# Patient Record
Sex: MALE | Race: WHITE | HISPANIC OR LATINO | Employment: OTHER | URBAN - METROPOLITAN AREA
[De-identification: names, ages, dates, MRNs, and addresses within clinical notes are randomized per-mention and may not be internally consistent; named-entity substitution may affect disease eponyms.]

---

## 2017-01-18 ENCOUNTER — ALLSCRIPTS OFFICE VISIT (OUTPATIENT)
Dept: OTHER | Facility: OTHER | Age: 62
End: 2017-01-18

## 2017-01-18 DIAGNOSIS — I10 ESSENTIAL (PRIMARY) HYPERTENSION: ICD-10-CM

## 2017-01-18 DIAGNOSIS — I26.99 OTHER PULMONARY EMBOLISM WITHOUT ACUTE COR PULMONALE (HCC): ICD-10-CM

## 2017-01-18 DIAGNOSIS — I70.219 ATHEROSCLEROSIS OF NATIVE ARTERIES OF EXTREMITY WITH INTERMITTENT CLAUDICATION (HCC): ICD-10-CM

## 2017-01-18 DIAGNOSIS — R09.89 OTHER SPECIFIED SYMPTOMS AND SIGNS INVOLVING THE CIRCULATORY AND RESPIRATORY SYSTEMS: ICD-10-CM

## 2017-01-18 DIAGNOSIS — E55.9 VITAMIN D DEFICIENCY: ICD-10-CM

## 2017-01-23 ENCOUNTER — GENERIC CONVERSION - ENCOUNTER (OUTPATIENT)
Dept: OTHER | Facility: OTHER | Age: 62
End: 2017-01-23

## 2017-01-23 ENCOUNTER — APPOINTMENT (OUTPATIENT)
Dept: LAB | Facility: HOSPITAL | Age: 62
End: 2017-01-23
Payer: MEDICARE

## 2017-01-23 DIAGNOSIS — I10 ESSENTIAL (PRIMARY) HYPERTENSION: ICD-10-CM

## 2017-01-23 DIAGNOSIS — E55.9 VITAMIN D DEFICIENCY: ICD-10-CM

## 2017-01-23 DIAGNOSIS — I70.219 ATHEROSCLEROSIS OF NATIVE ARTERIES OF EXTREMITY WITH INTERMITTENT CLAUDICATION (HCC): ICD-10-CM

## 2017-01-23 DIAGNOSIS — I26.99 OTHER PULMONARY EMBOLISM WITHOUT ACUTE COR PULMONALE (HCC): ICD-10-CM

## 2017-01-23 DIAGNOSIS — R09.89 OTHER SPECIFIED SYMPTOMS AND SIGNS INVOLVING THE CIRCULATORY AND RESPIRATORY SYSTEMS: ICD-10-CM

## 2017-01-23 LAB
ALBUMIN SERPL BCP-MCNC: 3.5 G/DL (ref 3.5–5)
ALP SERPL-CCNC: 57 U/L (ref 46–116)
ALT SERPL W P-5'-P-CCNC: 28 U/L (ref 12–78)
ANION GAP SERPL CALCULATED.3IONS-SCNC: 5 MMOL/L (ref 4–13)
AST SERPL W P-5'-P-CCNC: 22 U/L (ref 5–45)
BASOPHILS # BLD AUTO: 0.04 THOUSANDS/ΜL (ref 0–0.1)
BASOPHILS NFR BLD AUTO: 0 % (ref 0–1)
BILIRUB SERPL-MCNC: 0.73 MG/DL (ref 0.2–1)
BUN SERPL-MCNC: 17 MG/DL (ref 5–25)
CALCIUM SERPL-MCNC: 9.7 MG/DL (ref 8.3–10.1)
CHLORIDE SERPL-SCNC: 101 MMOL/L (ref 100–108)
CHOLEST SERPL-MCNC: 190 MG/DL (ref 50–200)
CO2 SERPL-SCNC: 32 MMOL/L (ref 21–32)
CREAT SERPL-MCNC: 1.06 MG/DL (ref 0.6–1.3)
CREAT UR-MCNC: 220 MG/DL
EOSINOPHIL # BLD AUTO: 0.41 THOUSAND/ΜL (ref 0–0.61)
EOSINOPHIL NFR BLD AUTO: 4 % (ref 0–6)
ERYTHROCYTE [DISTWIDTH] IN BLOOD BY AUTOMATED COUNT: 13.3 % (ref 11.6–15.1)
GFR SERPL CREATININE-BSD FRML MDRD: >60 ML/MIN/1.73SQ M
GLUCOSE SERPL-MCNC: 100 MG/DL (ref 65–140)
HCT VFR BLD AUTO: 43.5 % (ref 36.5–49.3)
HDLC SERPL-MCNC: 38 MG/DL (ref 40–60)
HGB BLD-MCNC: 14.4 G/DL (ref 12–17)
LDLC SERPL CALC-MCNC: 119 MG/DL (ref 0–100)
LYMPHOCYTES # BLD AUTO: 4.27 THOUSANDS/ΜL (ref 0.6–4.47)
LYMPHOCYTES NFR BLD AUTO: 45 % (ref 14–44)
MCH RBC QN AUTO: 30.1 PG (ref 26.8–34.3)
MCHC RBC AUTO-ENTMCNC: 33.1 G/DL (ref 31.4–37.4)
MCV RBC AUTO: 91 FL (ref 82–98)
MICROALBUMIN UR-MCNC: 37.4 MG/L (ref 0–20)
MICROALBUMIN/CREAT 24H UR: 17 MG/G CREATININE (ref 0–30)
MONOCYTES # BLD AUTO: 0.8 THOUSAND/ΜL (ref 0.17–1.22)
MONOCYTES NFR BLD AUTO: 8 % (ref 4–12)
NEUTROPHILS # BLD AUTO: 4.23 THOUSANDS/ΜL (ref 1.85–7.62)
NEUTS SEG NFR BLD AUTO: 43 % (ref 43–75)
PLATELET # BLD AUTO: 236 THOUSANDS/UL (ref 149–390)
PMV BLD AUTO: 10.8 FL (ref 8.9–12.7)
POTASSIUM SERPL-SCNC: 4.5 MMOL/L (ref 3.5–5.3)
PROT SERPL-MCNC: 8 G/DL (ref 6.4–8.2)
RBC # BLD AUTO: 4.78 MILLION/UL (ref 3.88–5.62)
SODIUM SERPL-SCNC: 138 MMOL/L (ref 136–145)
TRIGL SERPL-MCNC: 164 MG/DL
TSH SERPL DL<=0.05 MIU/L-ACNC: 1.93 UIU/ML (ref 0.36–3.74)
WBC # BLD AUTO: 9.75 THOUSAND/UL (ref 4.31–10.16)

## 2017-01-23 PROCEDURE — 82043 UR ALBUMIN QUANTITATIVE: CPT

## 2017-01-23 PROCEDURE — 85025 COMPLETE CBC W/AUTO DIFF WBC: CPT

## 2017-01-23 PROCEDURE — 82570 ASSAY OF URINE CREATININE: CPT

## 2017-01-23 PROCEDURE — 84443 ASSAY THYROID STIM HORMONE: CPT

## 2017-01-23 PROCEDURE — 36415 COLL VENOUS BLD VENIPUNCTURE: CPT

## 2017-01-23 PROCEDURE — 80061 LIPID PANEL: CPT

## 2017-01-23 PROCEDURE — 80053 COMPREHEN METABOLIC PANEL: CPT

## 2017-01-24 ENCOUNTER — TRANSCRIBE ORDERS (OUTPATIENT)
Dept: ADMINISTRATIVE | Facility: HOSPITAL | Age: 62
End: 2017-01-24

## 2017-01-24 DIAGNOSIS — I10 ESSENTIAL HYPERTENSION, MALIGNANT: Primary | ICD-10-CM

## 2017-01-24 DIAGNOSIS — I26.99 IATROGENIC PULMONARY EMBOLISM AND INFARCTION, INITIAL ENCOUNTER (HCC): ICD-10-CM

## 2017-01-24 DIAGNOSIS — T81.718A IATROGENIC PULMONARY EMBOLISM AND INFARCTION, INITIAL ENCOUNTER (HCC): ICD-10-CM

## 2017-01-24 DIAGNOSIS — R05.9 COUGH: ICD-10-CM

## 2017-01-30 ENCOUNTER — HOSPITAL ENCOUNTER (OUTPATIENT)
Dept: PULMONOLOGY | Facility: HOSPITAL | Age: 62
Discharge: HOME | End: 2017-01-30
Payer: MEDICARE

## 2017-01-30 ENCOUNTER — GENERIC CONVERSION - ENCOUNTER (OUTPATIENT)
Dept: OTHER | Facility: OTHER | Age: 62
End: 2017-01-30

## 2017-01-30 DIAGNOSIS — I10 ESSENTIAL HYPERTENSION, MALIGNANT: ICD-10-CM

## 2017-01-30 DIAGNOSIS — T81.718A IATROGENIC PULMONARY EMBOLISM AND INFARCTION, INITIAL ENCOUNTER (HCC): ICD-10-CM

## 2017-01-30 DIAGNOSIS — I26.99 IATROGENIC PULMONARY EMBOLISM AND INFARCTION, INITIAL ENCOUNTER (HCC): ICD-10-CM

## 2017-01-30 DIAGNOSIS — R05.9 COUGH: ICD-10-CM

## 2017-01-30 PROCEDURE — 94060 EVALUATION OF WHEEZING: CPT

## 2017-01-30 PROCEDURE — 94726 PLETHYSMOGRAPHY LUNG VOLUMES: CPT

## 2017-01-30 PROCEDURE — 94760 N-INVAS EAR/PLS OXIMETRY 1: CPT

## 2017-01-30 PROCEDURE — 94729 DIFFUSING CAPACITY: CPT

## 2017-01-30 RX ORDER — SODIUM CHLORIDE FOR INHALATION 0.9 %
VIAL, NEBULIZER (ML) INHALATION
Status: DISCONTINUED
Start: 2017-01-30 | End: 2017-02-03 | Stop reason: HOSPADM

## 2017-08-01 ENCOUNTER — GENERIC CONVERSION - ENCOUNTER (OUTPATIENT)
Dept: OTHER | Facility: OTHER | Age: 62
End: 2017-08-01

## 2017-08-04 ENCOUNTER — APPOINTMENT (OUTPATIENT)
Dept: LAB | Facility: CLINIC | Age: 62
End: 2017-08-04
Payer: MEDICARE

## 2017-08-04 ENCOUNTER — TRANSCRIBE ORDERS (OUTPATIENT)
Dept: LAB | Facility: CLINIC | Age: 62
End: 2017-08-04

## 2017-08-04 ENCOUNTER — ALLSCRIPTS OFFICE VISIT (OUTPATIENT)
Dept: OTHER | Facility: OTHER | Age: 62
End: 2017-08-04

## 2017-08-04 DIAGNOSIS — E55.9 VITAMIN D DEFICIENCY: ICD-10-CM

## 2017-08-04 DIAGNOSIS — I73.9 PERIPHERAL VASCULAR DISEASE (HCC): ICD-10-CM

## 2017-08-04 DIAGNOSIS — I10 ESSENTIAL (PRIMARY) HYPERTENSION: ICD-10-CM

## 2017-08-04 LAB
25(OH)D3 SERPL-MCNC: 24.6 NG/ML (ref 30–100)
ALBUMIN SERPL BCP-MCNC: 3.3 G/DL (ref 3.5–5)
ALP SERPL-CCNC: 58 U/L (ref 46–116)
ALT SERPL W P-5'-P-CCNC: 28 U/L (ref 12–78)
ANION GAP SERPL CALCULATED.3IONS-SCNC: 5 MMOL/L (ref 4–13)
AST SERPL W P-5'-P-CCNC: 16 U/L (ref 5–45)
BASOPHILS # BLD AUTO: 0.05 THOUSANDS/ΜL (ref 0–0.1)
BASOPHILS NFR BLD AUTO: 1 % (ref 0–1)
BILIRUB SERPL-MCNC: 0.33 MG/DL (ref 0.2–1)
BUN SERPL-MCNC: 24 MG/DL (ref 5–25)
CALCIUM SERPL-MCNC: 9.6 MG/DL (ref 8.3–10.1)
CHLORIDE SERPL-SCNC: 109 MMOL/L (ref 100–108)
CHOLEST SERPL-MCNC: 170 MG/DL (ref 50–200)
CO2 SERPL-SCNC: 30 MMOL/L (ref 21–32)
CREAT SERPL-MCNC: 1.07 MG/DL (ref 0.6–1.3)
CREAT UR-MCNC: 194 MG/DL
EOSINOPHIL # BLD AUTO: 0.33 THOUSAND/ΜL (ref 0–0.61)
EOSINOPHIL NFR BLD AUTO: 4 % (ref 0–6)
ERYTHROCYTE [DISTWIDTH] IN BLOOD BY AUTOMATED COUNT: 13.4 % (ref 11.6–15.1)
EST. AVERAGE GLUCOSE BLD GHB EST-MCNC: 134 MG/DL
GFR SERPL CREATININE-BSD FRML MDRD: 74 ML/MIN/1.73SQ M
GLUCOSE P FAST SERPL-MCNC: 103 MG/DL (ref 65–99)
HBA1C MFR BLD: 6.3 % (ref 4.2–6.3)
HCT VFR BLD AUTO: 38.9 % (ref 36.5–49.3)
HDLC SERPL-MCNC: 33 MG/DL (ref 40–60)
HGB BLD-MCNC: 12.8 G/DL (ref 12–17)
LDLC SERPL CALC-MCNC: 94 MG/DL (ref 0–100)
LYMPHOCYTES # BLD AUTO: 4.01 THOUSANDS/ΜL (ref 0.6–4.47)
LYMPHOCYTES NFR BLD AUTO: 46 % (ref 14–44)
MCH RBC QN AUTO: 30.3 PG (ref 26.8–34.3)
MCHC RBC AUTO-ENTMCNC: 32.9 G/DL (ref 31.4–37.4)
MCV RBC AUTO: 92 FL (ref 82–98)
MICROALBUMIN UR-MCNC: 49.6 MG/L (ref 0–20)
MICROALBUMIN/CREAT 24H UR: 26 MG/G CREATININE (ref 0–30)
MONOCYTES # BLD AUTO: 0.71 THOUSAND/ΜL (ref 0.17–1.22)
MONOCYTES NFR BLD AUTO: 8 % (ref 4–12)
NEUTROPHILS # BLD AUTO: 3.5 THOUSANDS/ΜL (ref 1.85–7.62)
NEUTS SEG NFR BLD AUTO: 41 % (ref 43–75)
NRBC BLD AUTO-RTO: 0 /100 WBCS
PLATELET # BLD AUTO: 192 THOUSANDS/UL (ref 149–390)
PMV BLD AUTO: 11.6 FL (ref 8.9–12.7)
POTASSIUM SERPL-SCNC: 4.6 MMOL/L (ref 3.5–5.3)
PROT SERPL-MCNC: 7.2 G/DL (ref 6.4–8.2)
RBC # BLD AUTO: 4.23 MILLION/UL (ref 3.88–5.62)
SODIUM SERPL-SCNC: 144 MMOL/L (ref 136–145)
TRIGL SERPL-MCNC: 213 MG/DL
TSH SERPL DL<=0.05 MIU/L-ACNC: 0.42 UIU/ML (ref 0.36–3.74)
WBC # BLD AUTO: 8.64 THOUSAND/UL (ref 4.31–10.16)

## 2017-08-04 PROCEDURE — 84443 ASSAY THYROID STIM HORMONE: CPT

## 2017-08-04 PROCEDURE — 82306 VITAMIN D 25 HYDROXY: CPT

## 2017-08-04 PROCEDURE — 82570 ASSAY OF URINE CREATININE: CPT

## 2017-08-04 PROCEDURE — 80061 LIPID PANEL: CPT

## 2017-08-04 PROCEDURE — 80053 COMPREHEN METABOLIC PANEL: CPT

## 2017-08-04 PROCEDURE — 82043 UR ALBUMIN QUANTITATIVE: CPT

## 2017-08-04 PROCEDURE — 83036 HEMOGLOBIN GLYCOSYLATED A1C: CPT

## 2017-08-04 PROCEDURE — 85025 COMPLETE CBC W/AUTO DIFF WBC: CPT

## 2017-08-04 PROCEDURE — 36415 COLL VENOUS BLD VENIPUNCTURE: CPT

## 2017-08-10 ENCOUNTER — TRANSCRIBE ORDERS (OUTPATIENT)
Dept: ADMINISTRATIVE | Facility: HOSPITAL | Age: 62
End: 2017-08-10

## 2017-08-10 DIAGNOSIS — T81.718A IATROGENIC PULMONARY EMBOLISM AND INFARCTION, INITIAL ENCOUNTER (HCC): ICD-10-CM

## 2017-08-10 DIAGNOSIS — I73.9 PERIPHERAL VASCULAR DISEASE, UNSPECIFIED (HCC): ICD-10-CM

## 2017-08-10 DIAGNOSIS — R05.9 COUGH: ICD-10-CM

## 2017-08-10 DIAGNOSIS — I26.99 IATROGENIC PULMONARY EMBOLISM AND INFARCTION, INITIAL ENCOUNTER (HCC): ICD-10-CM

## 2017-08-10 DIAGNOSIS — I10 ESSENTIAL HYPERTENSION, MALIGNANT: Primary | ICD-10-CM

## 2017-08-17 ENCOUNTER — HOSPITAL ENCOUNTER (OUTPATIENT)
Dept: PULMONOLOGY | Facility: HOSPITAL | Age: 62
Discharge: HOME | End: 2017-08-17
Payer: MEDICARE

## 2017-08-17 ENCOUNTER — HOSPITAL ENCOUNTER (OUTPATIENT)
Dept: ULTRASOUND IMAGING | Facility: HOSPITAL | Age: 62
Discharge: HOME | End: 2017-08-17
Payer: MEDICARE

## 2017-08-17 ENCOUNTER — GENERIC CONVERSION - ENCOUNTER (OUTPATIENT)
Dept: OTHER | Facility: OTHER | Age: 62
End: 2017-08-17

## 2017-08-17 DIAGNOSIS — R05.9 COUGH: ICD-10-CM

## 2017-08-17 DIAGNOSIS — T81.718A IATROGENIC PULMONARY EMBOLISM AND INFARCTION, INITIAL ENCOUNTER (HCC): ICD-10-CM

## 2017-08-17 DIAGNOSIS — I26.99 IATROGENIC PULMONARY EMBOLISM AND INFARCTION, INITIAL ENCOUNTER (HCC): ICD-10-CM

## 2017-08-17 DIAGNOSIS — I10 ESSENTIAL HYPERTENSION, MALIGNANT: ICD-10-CM

## 2017-08-17 DIAGNOSIS — I73.9 PERIPHERAL VASCULAR DISEASE, UNSPECIFIED (HCC): ICD-10-CM

## 2017-08-17 DIAGNOSIS — I73.9 PERIPHERAL VASCULAR DISEASE (HCC): ICD-10-CM

## 2017-08-17 DIAGNOSIS — I10 ESSENTIAL (PRIMARY) HYPERTENSION: ICD-10-CM

## 2017-08-17 PROCEDURE — 94060 EVALUATION OF WHEEZING: CPT

## 2017-08-17 PROCEDURE — 94760 N-INVAS EAR/PLS OXIMETRY 1: CPT

## 2017-08-17 PROCEDURE — 94729 DIFFUSING CAPACITY: CPT

## 2017-08-17 PROCEDURE — 94726 PLETHYSMOGRAPHY LUNG VOLUMES: CPT

## 2017-08-17 PROCEDURE — 76775 US EXAM ABDO BACK WALL LIM: CPT

## 2017-08-17 RX ORDER — ALBUTEROL SULFATE 2.5 MG/3ML
2.5 SOLUTION RESPIRATORY (INHALATION) ONCE AS NEEDED
Status: COMPLETED | OUTPATIENT
Start: 2017-08-17 | End: 2017-08-17

## 2017-08-17 RX ADMIN — ALBUTEROL SULFATE 2.5 MG: 2.5 SOLUTION RESPIRATORY (INHALATION) at 09:43

## 2017-12-11 ENCOUNTER — GENERIC CONVERSION - ENCOUNTER (OUTPATIENT)
Dept: OTHER | Facility: OTHER | Age: 62
End: 2017-12-11

## 2017-12-11 DIAGNOSIS — E55.9 VITAMIN D DEFICIENCY: ICD-10-CM

## 2017-12-11 DIAGNOSIS — Z79.899 OTHER LONG TERM (CURRENT) DRUG THERAPY: ICD-10-CM

## 2017-12-11 DIAGNOSIS — I10 ESSENTIAL (PRIMARY) HYPERTENSION: ICD-10-CM

## 2017-12-16 ENCOUNTER — APPOINTMENT (OUTPATIENT)
Dept: LAB | Facility: HOSPITAL | Age: 62
End: 2017-12-16
Payer: MEDICARE

## 2017-12-16 DIAGNOSIS — I10 ESSENTIAL (PRIMARY) HYPERTENSION: ICD-10-CM

## 2017-12-16 DIAGNOSIS — E55.9 VITAMIN D DEFICIENCY: ICD-10-CM

## 2017-12-16 DIAGNOSIS — Z79.899 OTHER LONG TERM (CURRENT) DRUG THERAPY: ICD-10-CM

## 2017-12-16 LAB
25(OH)D3 SERPL-MCNC: 30 NG/ML (ref 30–100)
ALBUMIN SERPL BCP-MCNC: 3.6 G/DL (ref 3.5–5)
ALP SERPL-CCNC: 65 U/L (ref 46–116)
ALT SERPL W P-5'-P-CCNC: 30 U/L (ref 12–78)
ANION GAP SERPL CALCULATED.3IONS-SCNC: 4 MMOL/L (ref 4–13)
AST SERPL W P-5'-P-CCNC: 30 U/L (ref 5–45)
BASOPHILS # BLD AUTO: 0.06 THOUSANDS/ΜL (ref 0–0.1)
BASOPHILS NFR BLD AUTO: 1 % (ref 0–1)
BILIRUB SERPL-MCNC: 0.97 MG/DL (ref 0.2–1)
BUN SERPL-MCNC: 11 MG/DL (ref 5–25)
CALCIUM SERPL-MCNC: 9.4 MG/DL (ref 8.3–10.1)
CHLORIDE SERPL-SCNC: 104 MMOL/L (ref 100–108)
CHOLEST SERPL-MCNC: 182 MG/DL (ref 50–200)
CO2 SERPL-SCNC: 32 MMOL/L (ref 21–32)
CREAT SERPL-MCNC: 1.02 MG/DL (ref 0.6–1.3)
CREAT UR-MCNC: 144 MG/DL
EOSINOPHIL # BLD AUTO: 0.28 THOUSAND/ΜL (ref 0–0.61)
EOSINOPHIL NFR BLD AUTO: 3 % (ref 0–6)
ERYTHROCYTE [DISTWIDTH] IN BLOOD BY AUTOMATED COUNT: 12.9 % (ref 11.6–15.1)
EST. AVERAGE GLUCOSE BLD GHB EST-MCNC: 128 MG/DL
GFR SERPL CREATININE-BSD FRML MDRD: 78 ML/MIN/1.73SQ M
GLUCOSE P FAST SERPL-MCNC: 99 MG/DL (ref 65–99)
HBA1C MFR BLD: 6.1 % (ref 4.2–6.3)
HCT VFR BLD AUTO: 44.4 % (ref 36.5–49.3)
HDLC SERPL-MCNC: 42 MG/DL (ref 40–60)
HGB BLD-MCNC: 14.7 G/DL (ref 12–17)
LDLC SERPL CALC-MCNC: 117 MG/DL (ref 0–100)
LYMPHOCYTES # BLD AUTO: 3.89 THOUSANDS/ΜL (ref 0.6–4.47)
LYMPHOCYTES NFR BLD AUTO: 37 % (ref 14–44)
MCH RBC QN AUTO: 30.4 PG (ref 26.8–34.3)
MCHC RBC AUTO-ENTMCNC: 33.1 G/DL (ref 31.4–37.4)
MCV RBC AUTO: 92 FL (ref 82–98)
MICROALBUMIN UR-MCNC: 13.2 MG/L (ref 0–20)
MICROALBUMIN/CREAT 24H UR: 9 MG/G CREATININE (ref 0–30)
MONOCYTES # BLD AUTO: 0.67 THOUSAND/ΜL (ref 0.17–1.22)
MONOCYTES NFR BLD AUTO: 6 % (ref 4–12)
NEUTROPHILS # BLD AUTO: 5.61 THOUSANDS/ΜL (ref 1.85–7.62)
NEUTS SEG NFR BLD AUTO: 53 % (ref 43–75)
NRBC BLD AUTO-RTO: 0 /100 WBCS
PLATELET # BLD AUTO: 200 THOUSANDS/UL (ref 149–390)
PMV BLD AUTO: 10.9 FL (ref 8.9–12.7)
POTASSIUM SERPL-SCNC: 4.8 MMOL/L (ref 3.5–5.3)
PROT SERPL-MCNC: 8.1 G/DL (ref 6.4–8.2)
RBC # BLD AUTO: 4.84 MILLION/UL (ref 3.88–5.62)
SODIUM SERPL-SCNC: 140 MMOL/L (ref 136–145)
TRIGL SERPL-MCNC: 117 MG/DL
TSH SERPL DL<=0.05 MIU/L-ACNC: 1.42 UIU/ML (ref 0.36–3.74)
WBC # BLD AUTO: 10.51 THOUSAND/UL (ref 4.31–10.16)

## 2017-12-16 PROCEDURE — 82306 VITAMIN D 25 HYDROXY: CPT

## 2017-12-16 PROCEDURE — 80061 LIPID PANEL: CPT

## 2017-12-16 PROCEDURE — 85025 COMPLETE CBC W/AUTO DIFF WBC: CPT

## 2017-12-16 PROCEDURE — 36415 COLL VENOUS BLD VENIPUNCTURE: CPT

## 2017-12-16 PROCEDURE — 82043 UR ALBUMIN QUANTITATIVE: CPT

## 2017-12-16 PROCEDURE — 84443 ASSAY THYROID STIM HORMONE: CPT

## 2017-12-16 PROCEDURE — 80053 COMPREHEN METABOLIC PANEL: CPT

## 2017-12-16 PROCEDURE — 82570 ASSAY OF URINE CREATININE: CPT

## 2017-12-16 PROCEDURE — 83036 HEMOGLOBIN GLYCOSYLATED A1C: CPT

## 2018-01-11 NOTE — MISCELLANEOUS
Nyla Schaumann ,    [address] [place] y personal [address] recordarle e invitarle a aprovechar al barbi el nuevo servicio preventivo ofrecido por Medicare SIN COSTO ALGUNO PARA  USTED  [place] del servicio es la "[facility]"  Ludy nuevo servicio NO debe reemplazar ninguna rutina de visitas programadas por enfermedad graves, manejo de enfermedades crónicas o chequeos de oficina  ** La visita anual de bienestar NO es un examen físico **    Estas nuevas visitas anuales "gratuitas para usted" están diseñadas para atender cualquier SERVICIO  PREVENTATIVO que usted pueda requerir y MERECER, así usha identificar otros factores de riesgo específicos que puedan existir relacionados con biswas maría y bienestar general   Derrel Bernheim un PLAN DE ACCIÓN DE MARÍA PERSONALIZADA para  atender cualquier servicio preventivo o servicios de maría necesarios desarrollados en torno a oly propias necesidades específicas  En el momento de esta visita, puede recibir referencias para cualquier servicio necesario en relación con problemas  específicos, preocupaciones sociales / médicas o factores de riesgo para la maría  Usted tiene derecho a la Visita Anual de Bienestar gratuita cada 12 meses sin ningún costo de bolsillo para los servicios que se realizan en nuestra oficina  Si participa con el Plan Medicare Advantage (Medicare Blue, [employer], [employer] u otros planes de YamilaSEAT 4a), es elegible para ludy servicio  LLAME  HOY PARA PROGRAMAR BISWAS ARTIE! !  [phone]      Sinceramente,

## 2018-01-11 NOTE — MISCELLANEOUS
Provider Comments  Provider Comments:   patient no showed 10:40AM appointment 09/20/16      Signatures   Electronically signed by : CARLOS Turcios ; Sep 20 2016 11:33AM EST                       (Author)

## 2018-01-12 NOTE — PROCEDURES
Procedures by ALLAN Correa at  8/25/2016  2:30 PM      Author:  ALLAN Correa Service:  (none) Author Type:  Speech and Language Pathologist     Filed:  8/25/2016  4:36 PM Date of Service:  8/25/2016  2:30 PM Status:  Signed     :  ALLAN Correa (Speech and Language Pathologist)         Pre-procedure Diagnoses:       1  Pharyngoesophageal dysphagia [R13 14]       2  Feeding difficulties [R63 3]       3  Other general symptoms and signs [R68 89]                Procedures:       1  FL BARIUM Lajean Aye SPEECH [GRJ565 (Custom)]                                                      Video Swallow Study      Patient Name: July Ascencio  YQYIQ'S Date: 8/25/2016  par  par  Past Medical History  Past Medical History     Diagnosis  Date    DVT (deep venous thrombosis)     Hypercholesterolemia         Past Surgical History  Past Surgical History       Procedure   Laterality Date    Vascular surgery       Pr colonoscopy,diagnostic  N/A 5/17/2016     Procedure: COLONOSCOPY;  Surgeon: Faith Sunshine MD;  Location: [facility]; Service: Gastroenterology         Per recent visit w/ PCP:  History of Present Illness  64 yo  male with history of PVD, DVT, PE on long term anticoagulation with Xarelto, here today complaining of increasing difficulty swallowing solids  States he feels like when he is eating  the food gets stuck in his throat, he has to cough, or swallow liquids to help it move along  Has no problems with drinking fluids  Has become progressively worse over the last month            Active Problems  1  Acute deep vein thrombosis of both lower extremities, unspecified vein (453 40)  (I82 403)  2  Anticoagulant long-term use (V58 61) (Z79 01)  3  Arch pain, left (729 5) (M79 672)  4  Atherosclerosis of native arteries of the extremities with intermittent claudication (440 21)  (I70 219)  5  Cough (786 2) (R05)  6   Encounter for colorectal cancer screening (V77 51) (Z12 11,Z12 12)  7  Encounter for screening colonoscopy (V76 51) (Z12 11)  8  Erectile dysfunction of non-organic origin (302 72) (F52 21)  9  Heel spur (726 73) (M77 30)  10  Intermittent claudication (443 9) (I73 9)  11  Limb swelling (729 81) (M79 89)  12  Lumbago (724 2) (M54 5)  13  Need for pneumococcal vaccination (V03 82) (Z23)  14  Need for prophylactic vaccination and inoculation against influenza (V04 81) (Z23)  15  Need for Tdap vaccination (V06 1) (Z23)  16  Other pulmonary embolism and infarction (415 19) (I26 99)  17  Peripheral vascular disease (443 9) (I73 9)  18  Plantar fasciitis (728 71) (M72 2)  19  Pre-operative cardiovascular examination (V72 81) (Z01 810)  20  Screening for depression (V79 0) (Z13 89)  21  Taking a statin medication (V49 89) (Z78 9)  22  Testicular discomfort (608 9) (N50 8)  23  Tinea pedis of both feet (110 4) (B35 3)  24  Vitamin D deficiency (268 9) (E55 9)  Surgical History  1  History of Leg Decompress Fasciotomy Ant Lat Compartment, Debridement  2  History of Lindy Lwr Extr Arter Byp W/O Throm W/ Vein Patch Angioplasty    Pt is a 60yom from home referred for VBS due to c/o solid food dysphagia, particularly meat  Previous VBS:  none  Current Diet:  Regular w/ thin  Dentition:  Dentures  O2 requirement:  none  Oral mech:  Strength and ROM:  WNL  Vocal Quality/Speech:  WNL  Cognitive status:  ALERT    Consistencies administered: Barium laden applesauce, cheerios/nectar, soft solid, hard solid, thin liquids, 13mm barium pill  Pt took 2 pills dry, one at a time  He stated he always takes them dry  Liquids were administered by cup and straw  Pt was seated laterally at 90 degrees  Oral stage: WNL    Pharyngeal stage:   WNL  Swallow promptness:wnl  Epiglottic inversion:wnl  Laryngeal rise:wnl  Pharyngeal constriction:wnl  Vallecular retention:none  Pyriform retention:none  PPW coating:none  CP prominence:none  Penetration:none  Aspiration:none    Screening of Esophageal stage:  Retention: Pt had retention of both pills in upper thoracic esophagus  Pt took them dry  Both cleared w/ liquid wash  Summary:  Oral and pharyngeal stages were wnl  No retention, penetration, or aspiration  Dry pill retention upper thoracic esophagus  Cleared w/ liquid  Recommendations:  Diet: Regular  Avoid dry/dense foods  Liquids: thin  Meds:w/ liquid  Strategies: alternate food w/ sips of liquid (pt states he waits until the end of the meal to drink)  F/u ST tx:no  Consider consult with: GI vs PPI Vs esophagram/barium swallow     Results reviewed with:pt                       Received for:Provider  EPIC   Aug 25 2016  4:34PM [facility] Standard Time

## 2018-01-12 NOTE — RESULT NOTES
Message   benign colon polyp  Colonoscopy recommended in ten years  Verified Results  COLONOSCOPY (GI, SURG) [id number] 11:42AM Deepak Weir     Test Name Result Flag Reference   Colonoscopy 05/17/2016        Summary / No summary entered :      No summary entered   Documents attached :      ANAM OP NOTE - Deepak Sadler Lindyrosmery Salo: [id number] - Appointment -      Deepak Weir - (Gastroenterology Adult) (Result Document)  (1) TISSUE EXAM [id number] 10:06AM Deepak Weir     Test Name Result Flag Reference   LAB AP CASE REPORT (Report)     Surgical Pathology Report             Case: [id number]                   Authorizing Provider: Aristides Kumar MD      Collected:      05/17/2016 1006        Ordering Location:   [address] [place] [address]   Received:      05/17/2016 [address] [place] [address] Endoscopy                               Pathologist:      Olga Lopez MD                                Specimen:  Polyp, Colorectal, sigmoid colon polyp, cold bx   LAB AP FINAL DIAGNOSIS      A  Polyp, Colorectal, sigmoid colon polyp, cold biopsy:  - Hyperplastic polyp   - Negative for dysplasia and malignancy  Interpretation performed at [place] [address]  LAB AP SURGICAL ADDITIONAL INFORMATION (Report)     These tests were developed and their performance characteristics   determined by [facility] or [facility]  They may not be cleared or approved by the [place]  Food and   Drug Administration  The FDA has determined that such clearance or   approval is not necessary  These tests are used for clinical purposes  They should not be regarded as investigational or for research  This   laboratory has been approved by [place] [address], designated as a high-complexity   laboratory and is qualified to perform these tests  LAB AP GROSS DESCRIPTION (Report)     A   The specimen is received in formalin, labeled with the patient's name   and hospital number, and is designated sigmoid colon polyp  The specimen   consists of 2 tan-pink soft tissue fragments measuring 0 2 cm and 0 3 cm   in greatest dimension  Entirely submitted  One cassette  Note: The estimated total formalin fixation time based upon information   provided by the submitting clinician and the standard processing schedule   is 18 5 hours   MAS   LAB AP CLINICAL INFORMATION polyp     polyp

## 2018-01-14 VITALS
BODY MASS INDEX: 36.26 KG/M2 | TEMPERATURE: 96.5 F | DIASTOLIC BLOOD PRESSURE: 90 MMHG | RESPIRATION RATE: 18 BRPM | OXYGEN SATURATION: 99 % | HEART RATE: 84 BPM | HEIGHT: 61 IN | SYSTOLIC BLOOD PRESSURE: 148 MMHG | WEIGHT: 192.06 LBS

## 2018-01-14 NOTE — RESULT NOTES
Verified Results  * XR FOOT 3+ VIEW LEFT [id number] 05:03PM Tawanamouna Garima     Test Name Result Flag Reference   XR FOOT 3+ VW LEFT (Report)     LEFT FOOT     INDICATION: Posterior medial pain in left heel for the past 2-3 months  COMPARISON: None     VIEWS: 3; 3 weight bearing     FINDINGS:     There is no acute fracture or dislocation  Plantar calcaneal spur noted  No lytic or blastic lesions are seen  Soft tissues are unremarkable  IMPRESSION:     Small plantar calcaneal spur         Signed by:   Suzette Corley DO   1/18/16

## 2018-01-15 NOTE — PROGRESS NOTES
Assessment    1  Encounter for Medicare annual wellness exam (V70 0) (Z00 00)   2  Benign essential hypertension (401 1) (I10)   3  Peripheral vascular disease (443 9) (I73 9)    Plan  Acute deep vein thrombosis of both lower extremities, unspecified vein, Other pulmonary  embolism and infarction, Peripheral vascular disease    · Xarelto 20 MG Oral Tablet; One po daily  Atherosclerosis of native artery of extremity with intermittent claudication    · Pravastatin Sodium 40 MG Oral Tablet (Pravachol); take 1 tablet daily at bedtime  Benign essential hypertension    · From  Lisinopril 20 MG Oral Tablet take 1 tablet by mouth once daily  Patient  to contact PCP for an appointment for addtional refills To Lisinopril 20 MG Oral Tablet  TAKE ONE TABLET BY MOUTH EVERY DAY  Benign essential hypertension, Cough, Other pulmonary embolism and infarction,  Peripheral vascular disease    · Complete PFT; Status:Hold For - Scheduling; Requested for:04Aug2017;   Benign essential hypertension, Peripheral vascular disease    · (1) CBC/PLT/DIFF; Status:Active; Requested for:04Aug2017;    · (1) COMPREHENSIVE METABOLIC PANEL; Status:Active; Requested for:04Aug2017;    · (1) HEMOGLOBIN A1C; Status:Active; Requested for:04Aug2017;    · (1) LIPID PANEL FASTING W DIRECT LDL REFLEX; Status:Active; Requested  for:04Aug2017;    · (1) MICROALBUMIN CREATININE RATIO, RANDOM URINE; Status:Active; Requested  for:04Aug2017;    · (1) TSH WITH FT4 REFLEX; Status:Active; Requested for:04Aug2017;    · [address]; Status:Hold For - Scheduling; Requested for:04Aug2017;   Benign essential hypertension, Peripheral vascular disease, Vitamin D deficiency    · (1) VITAMIN D 25-HYDROXY; Status:Active;  Requested for:04Aug2017;   Encounter for Medicare annual wellness exam    · ECG 12-LEAD; Status:Complete - Retrospective By Protocol Authorization;   Done:  35MQT7612 10:15AM  Plantar fasciitis    · Meloxicam 7 5 MG Oral Tablet; 1 tablet bid prn pain  Vitamin D deficiency    · Vitamin D3 2000 UNIT Oral Tablet; Take 1 tablet daily    Discussion/Summary  Impression: Welcome to Medicare Visit  Cardiovascular screening and counseling: the risks and benefits of screening were discussed, due for a lipid panel and Dx - V81 2 Screen for CV Disorder  Diabetes screening and counseling: the risks and benefits of screening were discussed, counseling was given on ways to improve physical activity and due for blood glucose  Colorectal cancer screening and counseling: the risks and benefits of screening were discussed, the patient declines screening and counseling was given on ways to eat a high fiber diet  Prostate cancer screening and counseling: the risks and benefits of screening were discussed, due for PSA and Dx - V76 44 Screen PSA  Osteoporosis screening and counseling: the risks and benefits of screening were discussed and the patient declines screening  Abdominal aortic aneurysm screening and counseling: the risks and benefits of screening were discussed, counseling was given on tobacco cessation, screening US recommended and Dx - V81 2 Screen for CV Disorder  HIV screening and counseling: the patient declines screening  Patient Discussion: plan discussed with the patient, follow-up visit needed in one year  Self Referrals: No   Possible side effects of new medications were reviewed with the patient/guardian today  The treatment plan was reviewed with the patient/guardian  The patient/guardian understands and agrees with the treatment plan      Chief Complaint  pt here today for welcome physical       History of Present Illness  HPI: 56 yo  male with HTN, PVD, pulmonary embolism with history of bilateral LE DVT, on Xarelto, here today for Wellness exam   He currently smokes marijuana daily   Stopped smoking cigarettes several years ago, but states he smokes cigarette if he can not get marijuana   Complains of occasional rib pain and SOB    Welcome to [employer] and Wellness Visits: The patient is being seen for the welcome to medicare visit  Medicare Screening and Risk Factors   Hospitalizations: he has been previously hospitalizied and 5  Once per lifetime medicare screening tests: ECG (normal)  Medicare Screening Tests Risk Questions   Abdominal aortic aneurysm risk assessment: tobacco use  Osteoporosis risk assessment: none indicated  HIV risk assessment: none indicated  Drug and Alcohol Use: The patient is a former cigarette smoker  The patient reports never drinking alcohol  Alcohol concern:   The patient has no concerns about alcohol abuse  He frequently uses illicit drugs and reports using drugs 2 times per day  He reports using marijuana  He has declined drug treatment  Diet and Physical Activity: Current diet includes well balanced meals, low fat food choices, limited junk food, 0 servings of fruit per day, 1 servings of vegetables per day, 1 servings of meat per day, 1 servings of whole grains per day, 1 servings of simple carbohydrates per day, 1 servings of dairy products per day, 4 cups of coffee per day, 0 cups of tea per day, 1 cans of regular soda per day and 0 cans of diet soda per day  The patient does not exercise  Mood Disorder and Cognitive Impairment Screening: PHQ-9 Depression Scale   Over the past 2 weeks, how often have you been bothered by the following problems? 1 ) Little interest or pleasure in doing things? Nearly every day  2 ) Feeling down, depressed or hopeless? Nearly every day  3 ) Trouble falling asleep or sleeping too much? Half the days or more  4 ) Feeling tired or having little energy? Nearly every day  5 ) Poor appetite or overeating? Not at all    6 ) Feeling bad about yourself, or that you are a failure, or have let yourself or your family down? Half the days or more  7 ) Trouble concentrating on things, such as reading a newspaper or watching television? Half the days or more  8 ) Moving or speaking so slowly that other people could have noticed, or the opposite, moving or speaking faster than usual? Not at all    9 ) Thoughts that you would be off dead or of hurting yourself in some way? Not at all  Cognitive impairment screening: difficulty learning/retaining new information, difficulty handling complex tasks, denies difficulty with reasoning, denies difficulty with spatial ability and orientation, denies difficulty with language and denies difficulty with behavior  Functional Ability/Level of Safety: Hearing is slightly decreased  He does not use a hearing aid  Able to do activities of daily living without limitations:, but not bathing, not dressing, not continence, not transferring, not feeding, neither personal hygiene nor grooming  Able to do instrumental activities of daily living without limitations:  Able to participate in social activities without limitations  Activities of daily living details: needs help using the phone, but no transportation help needed, does not need help shopping, no meal preparation help needed, does not need help doing housework, does not need help doing laundry, does not need help managing medications and does not need help managing money  Co-Managers and Medical Equipment/Suppliers: See Patient Care Team      Patient Care Team    Care Team Member Role Specialty Office Number   Rebeca THOMAS  Family Medicine [phone]   [facility] Specialist Gastroenterology Adult [phone]     Review of Systems    Constitutional: negative  Head and Face: negative  Eyes: negative  ENT: negative  Cardiovascular: negative  Respiratory: shortness of breath, cough and clear sputum, but no wheezing, not sleeping upright or with extra pillows, no dry cough, no productive cough, no colored sputum and not vomiting blood  Gastrointestinal: negative  Genitourinary: negative  Musculoskeletal: joint stiffness, but as noted in HPI  Integumentary and Breasts: negative  Neurological: negative  Psychiatric: negative  Endocrine: negative  Active Problems    1  Acute deep vein thrombosis of both lower extremities, unspecified vein (453 40)   (I82 403)   2  Anticoagulant long-term use (V58 61) (Z79 01)   3  Aphagia (787 20) (R13 0)   4  Arch pain, left (729 5) (M79 672)   5  Atherosclerosis of native artery of extremity with intermittent claudication (440 21)   (I70 219)   6  Benign essential hypertension (401 1) (I10)   7  Choking sensation (784 99) (R09 89)   8  Cough (786 2) (R05)   9  Encounter for colorectal cancer screening (V76 51) (Z12 11,Z12 12)   10  Encounter for screening colonoscopy (V76 51) (Z12 11)   11  Erectile dysfunction of non-organic origin (302 72) (F52 21)   12  Heel spur (726 73) (M77 30)   13  Intermittent claudication (443 9) (I73 9)   14  Limb swelling (729 81) (M79 89)   15  Lumbago (724 2) (M54 5)   16  Need for pneumococcal vaccination (V03 82) (Z23)   17  Need for prophylactic vaccination and inoculation against influenza (V04 81) (Z23)   18  Need for Tdap vaccination (V06 1) (Z23)   19  Other pulmonary embolism and infarction (415 19) (I26 99)   20  Peripheral vascular disease (443 9) (I73 9)   21  Plantar fasciitis (728 71) (M72 2)   22  Pre-operative cardiovascular examination (V72 81) (Z01 810)   23  Screening for depression (V79 0) (Z13 89)   24  Taking a statin medication (V49 89) (Z79 899)   25  Testicular discomfort (608 9) (N50 819)   26  Tinea pedis of both feet (110 4) (B35 3)   27   Vitamin D deficiency (268 9) (E55 9)    Surgical History    · History of Leg Decompress Fasciotomy Ant & Lat Compartment, Debridement   · History of Lindy Lwr Extr Arter Byp W/O Throm W/ Vein Patch Angioplasty    Family History  Mother    · Family history of Brain tumor   · Denied: Family history of Drug abuse   · Family history of lung cancer (V16 1) (Z80 1)   · Denied: Family history of mental disorder   · Family history of myocardial infarction (V17 3) (Z82 49)   · Family history of type 2 diabetes mellitus (V18 0) (Z83 3)   · Denied: Family history of Mental health problem   · Patient's father is  (V24 11) (Z80 80)   · Patient's mother is  (V24 11) (Z80 80)  Father    · Denied: Family history of Drug abuse   · Family history of cardiac disorder (V17 49) (Z82 49)   · Family history of lung cancer (V16 1) (Z80 1)   · Denied: Family history of mental disorder   · Family history of myocardial infarction (V17 3) (Z82 49)   · Family history of type 2 diabetes mellitus (V18 0) (Z83 3)   · Denied: Family history of Mental health problem   · Patient's father is  (V25 9) (Z80 80)   · Patient's mother is  (V24 11) (Z80 80)    Social History    · Current Every Day Smoker (305 1)   · Denied: History of Former smoker   · History of drug use (305 93) (Z87 898)   · Marijuana   · No advance directives (V49 89) (Z78 9)   · No alcohol use   · [facility]  The social history was reviewed and is unchanged  Current Meds   1  Cilostazol 100 MG Oral Tablet; TAKE 1 TABLET TWICE DAILY; Therapy: 2014 to (Evaluate:2016)  Requested for: 2016; Last   Rx:2016 Ordered   2  Lisinopril 20 MG Oral Tablet; take 1 tablet by mouth once daily  Patient to contact PCP for   an appointment for addtional refills; Therapy: 2014 to (Evaluate:55Viu5017)  Requested for: 97EOB5829; Last   Rx:2017 Ordered   3  Meloxicam 7 5 MG Oral Tablet; 1 tablet bid prn pain; Therapy: 17GMF5779 to (Evaluate:2017)  Requested for: 41NPZ1384; Last   Rx:2017 Ordered   4  Pravastatin Sodium 40 MG Oral Tablet; take 1 tablet daily at bedtime; Therapy: 26Kzn2850 to (Evaluate:2017)  Requested for: 51LHS4766; Last   Rx:2017 Ordered   5  TraMADol HCl - 50 MG Oral Tablet; Take 1 tablet twice daily with meals; Therapy: 20IUN1488 to (Evaluate:27Fqt5263); Last IZ: Ordered   6   Vitamin D3 2000 UNIT Oral Tablet; Take 1 tablet daily; Therapy: 31Evm9075 to (Evaluate:55Syh9715)  Requested for: 24ILR6200; Last   Rx:12Fdd9279 Ordered   7  Xarelto 20 MG Oral Tablet; One po daily; Therapy: 87JXF3273 to (Evaluate:35Swh5449)  Requested for: 26TWM6845; Last   VC:51IAT2181 Ordered    Allergies    1  Penicillins    Immunizations   1 2    Influenza  04-Nov-2015 18-Jan-2017    PCV  04-Nov-2015     Tdap  17-Mar-2016      Vitals  Signs    Temperature: 96 6 F, Tympanic  Heart Rate: 99  Respiration: 18  Systolic: 397  Diastolic: 72  Height: 5 ft 1 in  Weight: 183 lb   BMI Calculated: 34 58  BSA Calculated: 1 82  O2 Saturation: 98    Physical Exam    Constitutional   General appearance: No acute distress, well appearing and well nourished  Head and Face   Head and face: Normal     Palpation of the face and sinuses: No sinus tenderness  Eyes   Conjunctiva and lids: No erythema, swelling or discharge  Pupils and irises: Equal, round, reactive to light  Ophthalmoscopic examination: Normal fundi and optic discs  Ears, Nose, Mouth, and Throat   External inspection of ears and nose: Normal     Otoscopic examination: Tympanic membranes translucent with normal light reflex  Canals patent without erythema  Nasal mucosa, septum, and turbinates: Normal without edema or erythema  Oropharynx: Normal with no erythema, edema, exudate or lesions  Neck   Neck: Supple, symmetric, trachea midline, no masses  Thyroid: Normal, no thyromegaly  Pulmonary   Respiratory effort: No increased work of breathing or signs of respiratory distress  Auscultation of lungs: Abnormal   Auscultation of the lungs revealed expiratory wheezing  no rales or crackles were heard bilaterally  no rhonchi  Cardiovascular   Auscultation of heart: Normal rate and rhythm, normal S1 and S2, no murmurs  Carotid pulses: 2+ bilaterally  Abdominal aorta: Normal     Femoral pulses: 2+ bilaterally  Pedal pulses: 2+ bilaterally  Chest   Breasts: Normal, no dimpling or skin changes appreciated  Palpation of breasts and axillae: Normal, no masses palpated  Abdomen   Abdomen: Non-tender, no masses  Liver and spleen: No hepatomegaly or splenomegaly  Genitourinary   Scrotal contents: Normal testes, no masses  Lymphatic   Palpation of lymph nodes in neck: No lymphadenopathy  Palpation of lymph nodes in axillae: No lymphadenopathy  Musculoskeletal   Gait and station: Normal     Inspection/palpation of digits and nails: Normal without clubbing or cyanosis  Inspection/palpation of joints, bones, and muscles: Normal     Range of motion: Normal     Stability: Normal     Skin   Skin and subcutaneous tissue: Normal without rashes or lesions  Psychiatric   Judgment and insight: Normal     Orientation to person, place and time: Normal     Recent and remote memory: Intact  Mood and affect: Normal        Results/Data  ECG 12-LEAD 22Wfj6780 10:15AM Sunshine Parker     Test Name Result Flag Reference   ECG 12-LEAD see EKG 8/4/2017         Procedure    Procedure: Visual Acuity Test    Indication: routine screening  Inforrmation supplied by md    Results: 20/20/40 in both eyes without corrective device   Color vision was reported by md and the results were  The patient tolerated the procedure well and was cooperative  There were no complications  Health Management  Encounter for colorectal cancer screening   COLONOSCOPY (GI, SURG); every 10 years;  Last 28NNU7159; Deferred until  01Jun2026: ; Temporary Deferral    Signatures   Electronically signed by : CARLOS Redd ; Aug  4 2017 10:46AM EST                       (Author)

## 2018-01-15 NOTE — RESULT NOTES
Verified Results  (1) CBC/PLT/DIFF 01Apr2016 09:26AM Tia Sheets   TW Order Number: [id number]    TW Order Number: [id number]     Test Name Result Flag Reference   WBC COUNT 9 78 Thousand/uL  4 31-10 16   RBC COUNT 4 22 Million/uL  3 88-5 62   HEMOGLOBIN 12 6 g/dL  12 0-17 0   HEMATOCRIT 38 3 %  36 5-49 3   MCV 91 fL  82-98   MCH 29 9 pg  26 8-34 3   MCHC 32 9 g/dL  31 4-37 4   RDW 13 9 %  11 6-15 1   MPV 10 8 fL  8 9-12 7   PLATELET COUNT 730 Thousands/uL  149-390   nRBC AUTOMATED 0 /100 WBCs     NEUTROPHILS RELATIVE PERCENT 40 % L 43-75   LYMPHOCYTES RELATIVE PERCENT 47 % H 14-44   MONOCYTES RELATIVE PERCENT 8 %  4-12   EOSINOPHILS RELATIVE PERCENT 5 %  0-6   BASOPHILS RELATIVE PERCENT 0 %  0-1   NEUTROPHILS ABSOLUTE COUNT 3 94 Thousands/µL  1 85-7 62   LYMPHOCYTES ABSOLUTE COUNT 4 54 Thousands/µL H 0 60-4 47   MONOCYTES ABSOLUTE COUNT 0 76 Thousand/µL  0 17-1 22   EOSINOPHILS ABSOLUTE COUNT 0 44 Thousand/µL  0 00-0 61   BASOPHILS ABSOLUTE COUNT 0 04 Thousands/µL  0 00-0 10     (1) COMPREHENSIVE METABOLIC PANEL 33CKW1550 33:58RH Tia Sheets   TW Order Number: [id number]      National Kidney Disease Education Program recommendations are as follows:  GFR calculation is accurate only with a steady state creatinine  Chronic Kidney disease less than 60 ml/min/1 73 sq  meters  Kidney failure less than 15 ml/min/1 73 sq  meters  Test Name Result Flag Reference   GLUCOSE,RANDM 97 mg/dL     If the patient is fasting, the ADA then defines impaired fasting glucose as > 100 mg/dL and diabetes as > or equal to 123 mg/dL     SODIUM 140 mmol/L  136-145   POTASSIUM 4 6 mmol/L  3 5-5 3   CHLORIDE 107 mmol/L  100-108   CARBON DIOXIDE 29 mmol/L  21-32   ANION GAP (CALC) 4 mmol/L  4-13   BLOOD UREA NITROGEN 13 mg/dL  5-25   CREATININE 0 99 mg/dL  0 60-1 30   Standardized to IDMS reference method   CALCIUM 8 1 mg/dL L 8 3-10 1   BILI, TOTAL 0 45 mg/dL  0 20-1 00   ALK PHOSPHATAS 67 U/L     ALT (SGPT) 34 U/L 12-78   AST(SGOT) 21 U/L  5-45   ALBUMIN 3 2 g/dL L 3 5-5 0   TOTAL PROTEIN 7 0 g/dL  6 4-8 2   eGFR Non-African American      >60 0 ml/min/1 73sq m     (1) LIPID PANEL FASTING W DIRECT LDL REFLEX 01Apr2016 09:26AM Bridget MAGUIRE Order Number: [id number]      Triglyceride:         Normal              <150 mg/dl       Borderline High    150-199 mg/dl       High               200-499 mg/dl       Very High          >499 mg/dl  Cholesterol:         Desirable        <200 mg/dl      Borderline High  200-239 mg/dl      High             >239 mg/dl  HDL Cholesterol:        High    >59 mg/dL      Low     <41 mg/dL  LDL Cholesterol:        Optimal          <100 mg/dl         Near Optimal     100-129 mg/dl        Above Optimal          Borderline High   130-159 mg/dl          High              160-189 mg/dl          Very High        >189 mg/dl  LDL CALCULATED:    This screening LDL is a calculated result  It does not have the accuracy of the Direct Measured LDL in the monitoring of patients with hyperlipidemia and/or statin therapy  Direct Measure LDL (ISI813) must be ordered separately in these patients  Test Name Result Flag Reference   CHOLESTEROL 162 mg/dL     LDL CHOLESTEROL CALCULATED 97 mg/dL  0-100   TRIGLYCERIDES 108 mg/dL  <=150   Specimen collection should occur prior to N-Acetylcysteine or Metamizole administration due to the potential for falsely depressed results  HDL,DIRECT 43 mg/dL  40-60     (1) MICROALBUMIN CREATININE RATIO, RANDOM URINE 01Apr2016 09:26AM Bridget MAGUIRE Order Number: [id number]     Test Name Result Flag Reference   MICROALBUMIN/ CREAT R 10 mg/g creatinine  0-30   MICROALBUMIN,URINE 20 5 mg/L H 0 0-20 0   CREATININE URINE 212 0 mg/dL       (1) TSH WITH FT4 REFLEX 01Apr2016 09:26AM Bridget MAGUIRE Order Number: [id number]    Patients undergoing fluorescein dye angiography may retain small amounts of fluorescein in the body for 48-72 hours post procedure  Samples containing fluorescein can produce falsely depressed TSH values  If the patient had this procedure,a specimen should be resubmitted post fluorescein clearance  Test Name Result Flag Reference   TSH 1 280 uIU/mL  0 358-3 740     (1) PSA (SCREEN) (Dx V76 44 Screen for Prostate Cancer) 01Apr2016 09:26AM Low Hairston Order Number: [id number]     Order Number: [id number]     Test Name Result Flag Reference   PROSTATE SPECIFIC ANTIGEN 0 3 ng/mL  0 0-4 0     (1) VITAMIN D 25-HYDROXY 01Apr2016 09:26AM Elder Lee    Order Number: [id number]    TW Order Number: [id number]     Test Name Result Flag Reference   VIT D 25-HYDROX 15 7 ng/mL L 30 0-100 0       Plan  Vitamin D deficiency    · Vitamin D3 2000 UNIT Oral Tablet; Take 1 tablet daily    Discussion/Summary   please let pt know BW shows his Vitamin D level is low at 15 should be at least 30, this can make him feel tired, and achy  needs to take Vit D  Sent to pharm Vit D 2000 IU take one daily   Rest of his labs WNL     Signatures   Electronically signed by : CARLOS Stein ; Apr 4 2016  4:15PM EST                       (Author)

## 2018-01-16 NOTE — MISCELLANEOUS
Message  7/19/16- ATTEMPTING TO REACH PT TO SCHEDULE APPT WITH PCP, LEFT VM ON DAUGHTER, DIMAS'S, PHONE FOR THEM TO CALL US TO SET UP APPT  --NL70      Active Problems    1  Acute deep vein thrombosis of both lower extremities, unspecified vein (453 40)   (I82 403)   2  Anticoagulant long-term use (V58 61) (Z79 01)   3  Arch pain, left (729 5) (M79 672)   4  Atherosclerosis of native arteries of the extremities with intermittent claudication (440 21)   (I70 219)   5  Cough (786 2) (R05)   6  Encounter for colorectal cancer screening (V76 51) (Z12 11,Z12 12)   7  Encounter for screening colonoscopy (V76 51) (Z12 11)   8  Erectile dysfunction of non-organic origin (302 72) (F52 21)   9  Heel spur (726 73) (M77 30)   10  Intermittent claudication (443 9) (I73 9)   11  Limb swelling (729 81) (M79 89)   12  Lumbago (724 2) (M54 5)   13  Need for pneumococcal vaccination (V03 82) (Z23)   14  Need for prophylactic vaccination and inoculation against influenza (V04 81) (Z23)   15  Need for Tdap vaccination (V06 1) (Z23)   16  Other pulmonary embolism and infarction (415 19) (I26 99)   17  Peripheral vascular disease (443 9) (I73 9)   18  Plantar fasciitis (728 71) (M72 2)   19  Pre-operative cardiovascular examination (V72 81) (Z01 810)   20  Screening for depression (V79 0) (Z13 89)   21  Taking a statin medication (V49 89) (Z78 9)   22  Testicular discomfort (608 9) (N50 8)   23  Tinea pedis of both feet (110 4) (B35 3)   24  Vitamin D deficiency (268 9) (E55 9)    Current Meds   1  Benzonatate 100 MG Oral Capsule; TAKE 1 CAPSULE 3 TIMES DAILY AS NEEDED; Therapy: 57TAJ4501 to (Evaluate:34Qky6606)  Requested for: 45PXK5145; Last   Rx:14Ufh2746 Ordered   2  Cilostazol 100 MG Oral Tablet; TAKE 1 TABLET TWICE DAILY; Therapy: 01Kjy3305 to (Evaluate:11Jun2016)  Requested for: 12Apr2016; Last   Rx:12Apr2016 Ordered   3  Ketoconazole 2 % External Cream; APPLY A THIN LAYER TO AFFECTED AREA(S)   TWICE DAILY;    Therapy: 95IYH3824 to (Evaluate:10Aug2016)  Requested for: 75Kep2887; Last   Rx:37Qya2378 Ordered   4  Lisinopril 20 MG Oral Tablet; take 1 tablet by mouth once daily  Patient to contact PCP for   an appointment for addtional refills; Therapy: 12JGF5672 to (Roselie Stage)  Requested for: 10VIP8457; Last   KJ:86XCJ6360 Ordered   5  Meloxicam 7 5 MG Oral Tablet; 1 tablet bid prn pain; Therapy: 43EAD2189 to (Roselie Stage)  Requested for: 75DDD0523; Last   GZ:13MHL5060 Ordered   6  Pravastatin Sodium 40 MG Oral Tablet; TAKE 1 TABLET DAILY AT BEDTIME; Therapy: 29Hxf6723 to (Roselie Stage)  Requested for: 88CFL6164; Last   YC:71XEP1420 Ordered   7  TraMADol HCl - 50 MG Oral Tablet; Take 1 tablet twice daily with meals; Therapy: 12WWP8518 to (Evaluate:05Djy3245); Last PO:39OSY9810 Ordered   8  TriLyte 420 GM Oral Solution Reconstituted; TAKE AS DIRECTED; Therapy: 18Mha8245 to (Last Rx:80Ulf2739)  Requested for: 05Mxb2098 Ordered   9  Vitamin D3 2000 UNIT Oral Tablet; Take 1 tablet daily; Therapy: 81UXR6651 to (Evaluate:28Nyo7014)  Requested for: 71CIK8074; Last   WC:61IYM1224 Ordered   10  Xarelto 20 MG Oral Tablet; One po daily; Therapy: 91AOV9643 to (Evaluate:10Aug2016)  Requested for: 75Qgj1597; Last    Rx:81Xdi6564 Ordered    Allergies    1   Penicillins    Signatures   Electronically signed by : CARLOS Griggs ; Jul 19 2016  2:42PM EST                       (Author)

## 2018-01-16 NOTE — RESULT NOTES
Verified Results  (1) CBC/PLT/DIFF [id number] 08:49AM Antoinette Rice    Order Number: [id number]     Test Name Result Flag Reference   WBC COUNT 9 75 Thousand/uL  4 31-10 16   RBC COUNT 4 78 Million/uL  3 88-5 62   HEMOGLOBIN 14 4 g/dL  12 0-17 0   HEMATOCRIT 43 5 %  36 5-49 3   MCV 91 fL  82-98   MCH 30 1 pg  26 8-34 3   MCHC 33 1 g/dL  31 4-37 4   RDW 13 3 %  11 6-15 1   MPV 10 8 fL  8 9-12 7   PLATELET COUNT 815 Thousands/uL  149-390   NEUTROPHILS RELATIVE PERCENT 43 %  43-75   LYMPHOCYTES RELATIVE PERCENT 45 % H 14-44   MONOCYTES RELATIVE PERCENT 8 %  4-12   EOSINOPHILS RELATIVE PERCENT 4 %  0-6   BASOPHILS RELATIVE PERCENT 0 %  0-1   NEUTROPHILS ABSOLUTE COUNT 4 23 Thousands/?L  1 85-7 62   LYMPHOCYTES ABSOLUTE COUNT 4 27 Thousands/?L  0 60-4 47   MONOCYTES ABSOLUTE COUNT 0 80 Thousand/?L  0 17-1 22   EOSINOPHILS ABSOLUTE COUNT 0 41 Thousand/?L  0 00-0 61   BASOPHILS ABSOLUTE COUNT 0 04 Thousands/?L  0 00-0 10   - Patient Instructions: This bloodwork is non-fasting  Please drink two glasses of water morning of bloodwork  - Patient Instructions: This bloodwork is non-fasting  Please drink two glasses of water morning of bloodwork  (1) COMPREHENSIVE METABOLIC PANEL [id number] 85:26BX Antoinette Rice    Order Number: [id number]     Test Name Result Flag Reference   GLUCOSE,RANDM 100 mg/dL     If the patient is fasting, the ADA then defines impaired fasting glucose as > 100 mg/dL and diabetes as > or equal to 123 mg/dL     SODIUM 138 mmol/L  136-145   POTASSIUM 4 5 mmol/L  3 5-5 3   CHLORIDE 101 mmol/L  100-108   CARBON DIOXIDE 32 mmol/L  21-32   ANION GAP (CALC) 5 mmol/L  4-13   BLOOD UREA NITROGEN 17 mg/dL  5-25   CREATININE 1 06 mg/dL  0 60-1 30   Standardized to IDMS reference method   CALCIUM 9 7 mg/dL  8 3-10 1   BILI, TOTAL 0 73 mg/dL  0 20-1 00   ALK PHOSPHATAS 57 U/L     ALT (SGPT) 28 U/L  12-78   AST(SGOT) 22 U/L  5-45   ALBUMIN 3 5 g/dL  3 5-5 0   TOTAL PROTEIN 8 0 g/dL  6 4-8 2   eGFR Non-African American      >60 0 ml/min/1 73sq m   - Patient Instructions: This is a fasting blood test  Water, black tea or black coffee only after 9:00pm the night before test Drink 2 glasses of water the morning of test   National Kidney Disease Education Program recommendations are as follows:  GFR calculation is accurate only with a steady state creatinine  Chronic Kidney disease less than 60 ml/min/1 73 sq  meters  Kidney failure less than 15 ml/min/1 73 sq  meters  (1) MICROALBUMIN CREATININE RATIO, RANDOM URINE [id number] 08:49AM [employer] Order Number: [id number]     Test Name Result Flag Reference   MICROALBUMIN/ CREAT R 17 mg/g creatinine  0-30   MICROALBUMIN,URINE 37 4 mg/L H 0 0-20 0   CREATININE URINE 220 0 mg/dL       (1) TSH WITH FT4 REFLEX [id number] 08:49AM [employer] Order Number: [id number]     Test Name Result Flag Reference   TSH 1 933 uIU/mL  0 358-3 740   - Patient Instructions: This is a fasting blood test  Water, black tea or black coffee only after 9:00pm the night before test Drink 2 glasses of water the morning of test   Patients undergoing fluorescein dye angiography may retain small amounts of fluorescein in the body for 48-72 hours post procedure  Samples containing fluorescein can produce falsely depressed TSH values  If the patient had this procedure,a specimen should be resubmitted post fluorescein clearance  (1) LIPID PANEL FASTING W DIRECT LDL REFLEX [id number] 08:49AM [employer] Order Number: [id number]     Test Name Result Flag Reference   CHOLESTEROL 190 mg/dL     LDL CHOLESTEROL CALCULATED 119 mg/dL H 0-100   - Patient Instructions: This is a fasting blood test  Water, black tea or black coffee only after 9:00pm the night before test   Drink 2 glasses of water the morning of test     - Patient Instructions:  This is a fasting blood test  Water, black tea or black coffee only after 9:00pm the night before test Drink 2 glasses of water the morning of test   Triglyceride:         Normal              <150 mg/dl       Borderline High    150-199 mg/dl       High               200-499 mg/dl       Very High          >499 mg/dl  Cholesterol:         Desirable        <200 mg/dl      Borderline High  200-239 mg/dl      High             >239 mg/dl  HDL Cholesterol:        High    >59 mg/dL      Low     <41 mg/dL  LDL Cholesterol:        Optimal          <100 mg/dl        Near Optimal     100-129 mg/dl        Above Optimal          Borderline High   130-159 mg/dl          High              160-189 mg/dl          Very High        >189 mg/dl  LDL CALCULATED:    This screening LDL is a calculated result  It does not have the accuracy of the Direct Measured LDL in the monitoring of patients with hyperlipidemia and/or statin therapy  Direct Measure LDL (YJY639) must be ordered separately in these patients  TRIGLYCERIDES 164 mg/dL H <=150   Specimen collection should occur prior to N-Acetylcysteine or Metamizole administration due to the potential for falsely depressed results  HDL,DIRECT 38 mg/dL L 40-60   Specimen collection should occur prior to Metamizole administration due to the potential for falsely depressed results  Discussion/Summary   pls let pt know BW shows his triglicerides are elevated at 165, should be 150 or less  Not going to change medications at this time, just watch diet, no fried foods, no montanez, no more than 3 eggs a week  Increase vegetables in diet and whole grains   Rest of the BW looks good      Signatures   Electronically signed by : CARLOS Martínez ; Jan 23 2017  3:50PM EST                       (Author)

## 2018-01-22 VITALS
TEMPERATURE: 96.6 F | HEART RATE: 99 BPM | WEIGHT: 183 LBS | DIASTOLIC BLOOD PRESSURE: 72 MMHG | OXYGEN SATURATION: 98 % | RESPIRATION RATE: 18 BRPM | BODY MASS INDEX: 34.55 KG/M2 | SYSTOLIC BLOOD PRESSURE: 140 MMHG | HEIGHT: 61 IN

## 2018-01-24 VITALS
RESPIRATION RATE: 18 BRPM | SYSTOLIC BLOOD PRESSURE: 148 MMHG | DIASTOLIC BLOOD PRESSURE: 76 MMHG | TEMPERATURE: 97.2 F | OXYGEN SATURATION: 99 % | HEIGHT: 61 IN | BODY MASS INDEX: 33.99 KG/M2 | HEART RATE: 79 BPM | WEIGHT: 180 LBS

## 2018-01-24 VITALS — DIASTOLIC BLOOD PRESSURE: 88 MMHG | SYSTOLIC BLOOD PRESSURE: 128 MMHG

## 2018-03-19 ENCOUNTER — OFFICE VISIT (OUTPATIENT)
Dept: FAMILY MEDICINE CLINIC | Facility: CLINIC | Age: 63
End: 2018-03-19
Payer: MEDICARE

## 2018-03-19 VITALS
HEART RATE: 111 BPM | WEIGHT: 174 LBS | DIASTOLIC BLOOD PRESSURE: 70 MMHG | BODY MASS INDEX: 32.85 KG/M2 | HEIGHT: 61 IN | TEMPERATURE: 97.5 F | SYSTOLIC BLOOD PRESSURE: 122 MMHG | RESPIRATION RATE: 18 BRPM | OXYGEN SATURATION: 97 %

## 2018-03-19 DIAGNOSIS — M54.50 CHRONIC MIDLINE LOW BACK PAIN WITHOUT SCIATICA: ICD-10-CM

## 2018-03-19 DIAGNOSIS — I10 BENIGN ESSENTIAL HYPERTENSION: Primary | ICD-10-CM

## 2018-03-19 DIAGNOSIS — R07.81 RIB PAIN ON RIGHT SIDE: ICD-10-CM

## 2018-03-19 DIAGNOSIS — G89.29 CHRONIC MIDLINE LOW BACK PAIN WITHOUT SCIATICA: ICD-10-CM

## 2018-03-19 DIAGNOSIS — I82.413 ACUTE DEEP VEIN THROMBOSIS (DVT) OF FEMORAL VEIN OF BOTH LOWER EXTREMITIES (HCC): ICD-10-CM

## 2018-03-19 DIAGNOSIS — I26.99 PULMONARY EMBOLISM WITH INFARCTION (HCC): ICD-10-CM

## 2018-03-19 PROBLEM — K66.0 ABDOMINAL ADHESIONS: Status: ACTIVE | Noted: 2017-12-11

## 2018-03-19 PROCEDURE — 99214 OFFICE O/P EST MOD 30 MIN: CPT | Performed by: FAMILY MEDICINE

## 2018-03-19 RX ORDER — PRAVASTATIN SODIUM 40 MG
40 TABLET ORAL DAILY
Qty: 90 TABLET | Refills: 1 | Status: SHIPPED | OUTPATIENT
Start: 2018-03-19 | End: 2018-09-19 | Stop reason: SDUPTHER

## 2018-03-19 RX ORDER — LISINOPRIL 20 MG/1
1 TABLET ORAL DAILY
COMMUNITY
Start: 2014-04-03

## 2018-03-19 RX ORDER — TRAMADOL HYDROCHLORIDE 50 MG/1
50 TABLET ORAL 2 TIMES DAILY
Qty: 30 TABLET | Refills: 0 | Status: SHIPPED | OUTPATIENT
Start: 2018-03-19

## 2018-03-19 RX ORDER — PRAVASTATIN SODIUM 40 MG
1 TABLET ORAL
COMMUNITY
Start: 2014-09-17 | End: 2018-03-19

## 2018-03-19 RX ORDER — LISINOPRIL 20 MG/1
20 TABLET ORAL DAILY
Qty: 90 TABLET | Refills: 1 | Status: SHIPPED | OUTPATIENT
Start: 2018-03-19 | End: 2018-09-19 | Stop reason: SDUPTHER

## 2018-03-19 RX ORDER — MELOXICAM 7.5 MG/1
1 TABLET ORAL 2 TIMES DAILY PRN
COMMUNITY
Start: 2015-11-04

## 2018-03-19 RX ORDER — TRAMADOL HYDROCHLORIDE 50 MG/1
1 TABLET ORAL
COMMUNITY
Start: 2015-06-03 | End: 2018-03-19

## 2018-03-19 RX ORDER — MELOXICAM 7.5 MG/1
7.5 TABLET ORAL DAILY
Qty: 90 TABLET | Refills: 0 | Status: SHIPPED | OUTPATIENT
Start: 2018-03-19 | End: 2018-07-03 | Stop reason: SDUPTHER

## 2018-03-19 NOTE — PROGRESS NOTES
Assessment/Plan:  Reviewed BW from 12/17 with patient  No need for BW today, will recheck in 3 months     Rib pain on right side  Rib series     Pulmonary embolism with infarction (HCC)  Continue Xarelto    Low back pain  Continue Tramadol PRN and Meloxicam PRN          Problem List Items Addressed This Visit        Respiratory    Pulmonary embolism with infarction ([facility] [address] )     Continue Xarelto         Relevant Medications    meloxicam (MOBIC) 7 5 mg tablet       Cardiovascular and Mediastinum    Acute deep vein thrombosis (DVT) of both lower extremities (HCC)    Benign essential hypertension - Primary    Relevant Medications    lisinopril (ZESTRIL) 20 mg tablet    lisinopril (ZESTRIL) 20 mg tablet    pravastatin (PRAVACHOL) 40 mg tablet       Other    Low back pain     Continue Tramadol PRN and Meloxicam PRN          Rib pain on right side     Rib series          Relevant Medications    meloxicam (MOBIC) 7 5 mg tablet    Other Relevant Orders    XR ribs right w pa chest min 3 views            Subjective:      Patient ID: Kavita Gurrola is a 60 y o  male  61 yo  male with well controlled HTN, s/p Pulmonary embolism currently on Xarelto here today for follow up of chronic conditions  [facility] about 4 months ago he hit his R side with a car door  Had severe pain for about 2 weeks which improved considerably however still has right sided rib pain on occasions  Pain is worse with movement and to touch  3-4/10  Not radiated, described as a stabbing pain  Improves with meloxicam    Low back pain is stable from prior visits on daily Tramadol  Denies radiation of LBP  The following portions of the patient's history were reviewed and updated as appropriate:   He  has a past medical history of DVT (deep venous thrombosis) ([facility] [address] ) and Hypercholesterolemia    He   Patient Active Problem List    Diagnosis Date Noted    Rib pain on right side 03/19/2018    Abdominal adhesions 12/11/2017    Benign essential hypertension 01/18/2017    Aphagia 08/02/2016    Choking sensation 08/02/2016    Vitamin D deficiency 04/04/2016    Heel spur 03/17/2016    Arch pain, left 11/16/2015    Intermittent claudication (HCC) 11/16/2015    Limb swelling 11/16/2015    Tinea pedis of both feet 11/16/2015    Plantar fasciitis 11/04/2015    Testicular discomfort 11/04/2015    Low back pain 06/04/2015    Erectile dysfunction of non-organic origin 07/17/2014    Atherosclerosis of native artery of extremity with intermittent claudication ([facility] ) 05/21/2014    Peripheral vascular disease ([place] [address] ) 05/21/2014    Acute deep vein thrombosis (DVT) of both lower extremities ([place] [address] ) 03/24/2014    Pulmonary embolism with infarction ([place] [address] ) 03/21/2013     He  has a past surgical history that includes Vascular surgery; pr colonoscopy flx dx w/collj spec when pfrmd (N/A, 5/17/2016); Anterior compartment decompression; and Angioplasty  His family history includes Diabetes type II in his father and mother; Heart attack in his father and mother; Heart disease in his father; Lung cancer in his father and mother; Other in his mother  He  reports that he has quit smoking  He has never used smokeless tobacco  He reports that he uses drugs, including Marijuana  He reports that he does not drink alcohol  Current Outpatient Prescriptions   Medication Sig Dispense Refill    lisinopril (ZESTRIL) 20 mg tablet Take 1 tablet by mouth daily      meloxicam (MOBIC) 7 5 mg tablet Take 1 tablet by mouth 2 (two) times a day as needed      rivaroxaban (XARELTO) 20 mg tablet Take by mouth daily      Aspirin (ASPIR-81 PO) Take by mouth   Cholecalciferol (D3 DOTS) 2000 UNITS TBDP Take by mouth   cilostazol (PLETAL) 100 mg tablet Take 100 mg by mouth 2 (two) times a day        lisinopril (ZESTRIL) 20 mg tablet Take 1 tablet (20 mg total) by mouth daily 90 tablet 1    meloxicam (MOBIC) 7 5 mg tablet Take 1 tablet (7 5 mg total) by mouth daily 90 tablet 0    pravastatin (PRAVACHOL) 40 mg tablet Take 1 tablet (40 mg total) by mouth daily 90 tablet 1    traMADol (ULTRAM) 50 mg tablet Take 50 mg by mouth 2 (two) times a day  No current facility-administered medications for this visit  Current Outpatient Prescriptions on File Prior to Visit   Medication Sig    Aspirin (ASPIR-81 PO) Take by mouth   Cholecalciferol (D3 DOTS) 2000 UNITS TBDP Take by mouth   cilostazol (PLETAL) 100 mg tablet Take 100 mg by mouth 2 (two) times a day   traMADol (ULTRAM) 50 mg tablet Take 50 mg by mouth 2 (two) times a day   [DISCONTINUED] lisinopril (ZESTRIL) 20 mg tablet Take 20 mg by mouth daily   [DISCONTINUED] meloxicam (MOBIC) 7 5 mg tablet Take 7 5 mg by mouth daily   [DISCONTINUED] pravastatin (PRAVACHOL) 40 mg tablet Take 40 mg by mouth daily   [DISCONTINUED] rivaroxaban (XARELTO) tablet Take 20 mg by mouth daily with breakfast      No current facility-administered medications on file prior to visit  He is allergic to penicillins       Review of Systems   Respiratory: Positive for shortness of breath  Musculoskeletal: Positive for back pain  Right sided rib pain    All other systems reviewed and are negative  Objective:      /70 (BP Location: Left arm, Patient Position: Sitting, Cuff Size: Standard)   Pulse (!) 111   Temp 97 5 °F (36 4 °C) (Tympanic)   Resp 18   Ht 5' 1" (1 549 m)   Wt 78 9 kg (174 lb)   SpO2 97%   BMI 32 88 kg/m²          Physical Exam   Constitutional: He is oriented to person, place, and time  He appears well-developed  HENT:   Head: Normocephalic  Right Ear: External ear normal    Left Ear: External ear normal    Nose: Nose normal    Mouth/Throat: Oropharynx is clear and moist    Eyes: Conjunctivae and EOM are normal  Pupils are equal, round, and reactive to light  Neck: Normal range of motion  Neck supple  No thyromegaly present     Cardiovascular: Normal rate, regular rhythm and normal heart sounds  Pulmonary/Chest: Effort normal and breath sounds normal    Abdominal: Soft  There is no tenderness  There is no rebound and no guarding  Musculoskeletal: Normal range of motion  Neurological: He is alert and oriented to person, place, and time  He has normal reflexes  Skin: Skin is dry  Psychiatric: He has a normal mood and affect  Nursing note and vitals reviewed

## 2018-05-08 ENCOUNTER — HOSPITAL ENCOUNTER (OUTPATIENT)
Dept: RADIOLOGY | Facility: HOSPITAL | Age: 63
Discharge: HOME | End: 2018-05-08
Payer: MEDICARE

## 2018-05-08 DIAGNOSIS — R07.81 RIB PAIN ON RIGHT SIDE: ICD-10-CM

## 2018-05-08 PROCEDURE — 71101 X-RAY EXAM UNILAT RIBS/CHEST: CPT

## 2018-07-03 DIAGNOSIS — R07.81 RIB PAIN ON RIGHT SIDE: ICD-10-CM

## 2018-07-03 DIAGNOSIS — I26.99 PULMONARY EMBOLISM WITH INFARCTION (HCC): ICD-10-CM

## 2018-07-05 RX ORDER — MELOXICAM 7.5 MG/1
7.5 TABLET ORAL DAILY
Qty: 90 TABLET | Refills: 0 | Status: SHIPPED | OUTPATIENT
Start: 2018-07-05 | End: 2018-10-09 | Stop reason: SDUPTHER

## 2018-09-19 DIAGNOSIS — I10 BENIGN ESSENTIAL HYPERTENSION: ICD-10-CM

## 2018-09-20 RX ORDER — PRAVASTATIN SODIUM 40 MG
40 TABLET ORAL DAILY
Qty: 90 TABLET | Refills: 0 | Status: SHIPPED | OUTPATIENT
Start: 2018-09-20 | End: 2018-12-18 | Stop reason: SDUPTHER

## 2018-09-20 RX ORDER — LISINOPRIL 20 MG/1
20 TABLET ORAL DAILY
Qty: 90 TABLET | Refills: 0 | Status: SHIPPED | OUTPATIENT
Start: 2018-09-20 | End: 2018-10-17

## 2018-10-09 DIAGNOSIS — R07.81 RIB PAIN ON RIGHT SIDE: ICD-10-CM

## 2018-10-09 DIAGNOSIS — I26.99 PULMONARY EMBOLISM WITH INFARCTION (HCC): ICD-10-CM

## 2018-10-09 RX ORDER — MELOXICAM 7.5 MG/1
7.5 TABLET ORAL DAILY
Qty: 90 TABLET | Refills: 0 | Status: SHIPPED | OUTPATIENT
Start: 2018-10-09 | End: 2019-01-14 | Stop reason: SDUPTHER

## 2018-10-17 ENCOUNTER — OFFICE VISIT (OUTPATIENT)
Dept: FAMILY MEDICINE CLINIC | Facility: CLINIC | Age: 63
End: 2018-10-17
Payer: MEDICARE

## 2018-10-17 ENCOUNTER — TRANSCRIBE ORDERS (OUTPATIENT)
Dept: LAB | Facility: CLINIC | Age: 63
End: 2018-10-17

## 2018-10-17 ENCOUNTER — APPOINTMENT (OUTPATIENT)
Dept: LAB | Facility: CLINIC | Age: 63
End: 2018-10-17
Payer: MEDICARE

## 2018-10-17 VITALS
OXYGEN SATURATION: 97 % | RESPIRATION RATE: 18 BRPM | TEMPERATURE: 96.8 F | BODY MASS INDEX: 32.85 KG/M2 | WEIGHT: 174 LBS | DIASTOLIC BLOOD PRESSURE: 60 MMHG | SYSTOLIC BLOOD PRESSURE: 158 MMHG | HEIGHT: 61 IN | HEART RATE: 73 BPM

## 2018-10-17 DIAGNOSIS — I82.413 ACUTE DEEP VEIN THROMBOSIS (DVT) OF FEMORAL VEIN OF BOTH LOWER EXTREMITIES (HCC): ICD-10-CM

## 2018-10-17 DIAGNOSIS — Z23 NEED FOR INFLUENZA VACCINATION: ICD-10-CM

## 2018-10-17 DIAGNOSIS — J41.8 MIXED SIMPLE AND MUCOPURULENT CHRONIC BRONCHITIS (HCC): Primary | ICD-10-CM

## 2018-10-17 DIAGNOSIS — E55.9 VITAMIN D DEFICIENCY: ICD-10-CM

## 2018-10-17 DIAGNOSIS — I10 BENIGN ESSENTIAL HYPERTENSION: ICD-10-CM

## 2018-10-17 PROBLEM — E78.00 HYPERCHOLESTEROLEMIA: Status: ACTIVE | Noted: 2018-10-17

## 2018-10-17 LAB
25(OH)D3 SERPL-MCNC: 27.4 NG/ML (ref 30–100)
ALBUMIN SERPL BCP-MCNC: 3.5 G/DL (ref 3.5–5)
ALP SERPL-CCNC: 56 U/L (ref 46–116)
ALT SERPL W P-5'-P-CCNC: 22 U/L (ref 12–78)
ANION GAP SERPL CALCULATED.3IONS-SCNC: 5 MMOL/L (ref 4–13)
AST SERPL W P-5'-P-CCNC: 22 U/L (ref 5–45)
BASOPHILS # BLD AUTO: 0.05 THOUSANDS/ΜL (ref 0–0.1)
BASOPHILS NFR BLD AUTO: 1 % (ref 0–1)
BILIRUB SERPL-MCNC: 0.65 MG/DL (ref 0.2–1)
BUN SERPL-MCNC: 13 MG/DL (ref 5–25)
CALCIUM SERPL-MCNC: 8.9 MG/DL (ref 8.3–10.1)
CHLORIDE SERPL-SCNC: 106 MMOL/L (ref 100–108)
CHOLEST SERPL-MCNC: 160 MG/DL (ref 50–200)
CO2 SERPL-SCNC: 29 MMOL/L (ref 21–32)
CREAT SERPL-MCNC: 1.04 MG/DL (ref 0.6–1.3)
CREAT UR-MCNC: 176 MG/DL
EOSINOPHIL # BLD AUTO: 0.38 THOUSAND/ΜL (ref 0–0.61)
EOSINOPHIL NFR BLD AUTO: 4 % (ref 0–6)
ERYTHROCYTE [DISTWIDTH] IN BLOOD BY AUTOMATED COUNT: 12.5 % (ref 11.6–15.1)
GFR SERPL CREATININE-BSD FRML MDRD: 76 ML/MIN/1.73SQ M
GLUCOSE P FAST SERPL-MCNC: 80 MG/DL (ref 65–99)
HCT VFR BLD AUTO: 40 % (ref 36.5–49.3)
HDLC SERPL-MCNC: 40 MG/DL (ref 40–60)
HGB BLD-MCNC: 12.5 G/DL (ref 12–17)
IMM GRANULOCYTES # BLD AUTO: 0.02 THOUSAND/UL (ref 0–0.2)
IMM GRANULOCYTES NFR BLD AUTO: 0 % (ref 0–2)
LDLC SERPL CALC-MCNC: 94 MG/DL (ref 0–100)
LYMPHOCYTES # BLD AUTO: 3.75 THOUSANDS/ΜL (ref 0.6–4.47)
LYMPHOCYTES NFR BLD AUTO: 43 % (ref 14–44)
MCH RBC QN AUTO: 29.6 PG (ref 26.8–34.3)
MCHC RBC AUTO-ENTMCNC: 31.3 G/DL (ref 31.4–37.4)
MCV RBC AUTO: 95 FL (ref 82–98)
MICROALBUMIN UR-MCNC: 18.8 MG/L (ref 0–20)
MICROALBUMIN/CREAT 24H UR: 11 MG/G CREATININE (ref 0–30)
MONOCYTES # BLD AUTO: 0.67 THOUSAND/ΜL (ref 0.17–1.22)
MONOCYTES NFR BLD AUTO: 8 % (ref 4–12)
NEUTROPHILS # BLD AUTO: 3.9 THOUSANDS/ΜL (ref 1.85–7.62)
NEUTS SEG NFR BLD AUTO: 44 % (ref 43–75)
NONHDLC SERPL-MCNC: 120 MG/DL
NRBC BLD AUTO-RTO: 0 /100 WBCS
PLATELET # BLD AUTO: 218 THOUSANDS/UL (ref 149–390)
PMV BLD AUTO: 11.2 FL (ref 8.9–12.7)
POTASSIUM SERPL-SCNC: 4.4 MMOL/L (ref 3.5–5.3)
PROT SERPL-MCNC: 7.4 G/DL (ref 6.4–8.2)
RBC # BLD AUTO: 4.22 MILLION/UL (ref 3.88–5.62)
SODIUM SERPL-SCNC: 140 MMOL/L (ref 136–145)
TRIGL SERPL-MCNC: 128 MG/DL
TSH SERPL DL<=0.05 MIU/L-ACNC: 1.32 UIU/ML (ref 0.36–3.74)
WBC # BLD AUTO: 8.77 THOUSAND/UL (ref 4.31–10.16)

## 2018-10-17 PROCEDURE — 84443 ASSAY THYROID STIM HORMONE: CPT | Performed by: FAMILY MEDICINE

## 2018-10-17 PROCEDURE — 82043 UR ALBUMIN QUANTITATIVE: CPT | Performed by: FAMILY MEDICINE

## 2018-10-17 PROCEDURE — 90471 IMMUNIZATION ADMIN: CPT

## 2018-10-17 PROCEDURE — 80061 LIPID PANEL: CPT | Performed by: FAMILY MEDICINE

## 2018-10-17 PROCEDURE — 85025 COMPLETE CBC W/AUTO DIFF WBC: CPT | Performed by: FAMILY MEDICINE

## 2018-10-17 PROCEDURE — 82570 ASSAY OF URINE CREATININE: CPT | Performed by: FAMILY MEDICINE

## 2018-10-17 PROCEDURE — 36415 COLL VENOUS BLD VENIPUNCTURE: CPT | Performed by: FAMILY MEDICINE

## 2018-10-17 PROCEDURE — 99214 OFFICE O/P EST MOD 30 MIN: CPT | Performed by: FAMILY MEDICINE

## 2018-10-17 PROCEDURE — 82306 VITAMIN D 25 HYDROXY: CPT | Performed by: FAMILY MEDICINE

## 2018-10-17 PROCEDURE — 80053 COMPREHEN METABOLIC PANEL: CPT | Performed by: FAMILY MEDICINE

## 2018-10-17 PROCEDURE — 90682 RIV4 VACC RECOMBINANT DNA IM: CPT

## 2018-10-17 RX ORDER — FLUTICASONE FUROATE AND VILANTEROL 100; 25 UG/1; UG/1
1 POWDER RESPIRATORY (INHALATION) DAILY
Qty: 3 INHALER | Refills: 1 | Status: SHIPPED | OUTPATIENT
Start: 2018-10-17 | End: 2019-03-18 | Stop reason: SDUPTHER

## 2018-10-17 NOTE — PROGRESS NOTES
Assessment/Plan:    Flu vaccine given today  Blood work done today  Elevate legs while seated        Problem List Items Addressed This Visit        Cardiovascular and Mediastinum    Acute deep vein thrombosis (DVT) of both lower extremities (HCC)    Benign essential hypertension    Relevant Medications    metoprolol tartrate (LOPRESSOR) 25 mg tablet    Other Relevant Orders    CBC and differential    Comprehensive metabolic panel    Lipid panel    Microalbumin / creatinine urine ratio    TSH, 3rd generation with Free T4 reflex    Vitamin D 25 hydroxy       Other    Vitamin D deficiency    Relevant Orders    Vitamin D 25 hydroxy      Other Visit Diagnoses     Mixed simple and mucopurulent chronic bronchitis ([address] )    -  Primary    Relevant Medications    fluticasone-vilanterol (BREO ELLIPTA) 100-25 mcg/inh inhaler    Need for influenza vaccination        Relevant Orders    influenza vaccine, [id number], quadrivalent, recombinant, PF, 0 5 mL, for patients 18 yr+ (FLUBLOK) (Completed)            Subjective:      Patient ID: Kerrie Goel is a 60 y o  male  59 yo  male with HTN, Hyperlipidemia, DVT and PE, here today for follow up of chronic conditions also complains of:  1- Increasing SOB and cough  Cough is constant  Productive of white thick sputum in the am and dry towrds the pa  No chest pain  Worse with activity  2- Swelling of LE, worse as the day goes by         The following portions of the patient's history were reviewed and updated as appropriate:   He  has a past medical history of DVT (deep venous thrombosis) (HCC) and Hypercholesterolemia    He   Patient Active Problem List    Diagnosis Date Noted    Hypercholesterolemia 10/17/2018    Rib pain on right side 03/19/2018    Abdominal adhesions 12/11/2017    Benign essential hypertension 01/18/2017    Aphagia 08/02/2016    Choking sensation 08/02/2016    Vitamin D deficiency 04/04/2016    Heel spur 03/17/2016    Arch pain, left 11/16/2015  Intermittent claudication (HCC) 11/16/2015    Limb swelling 11/16/2015    Tinea pedis of both feet 11/16/2015    Plantar fasciitis 11/04/2015    Testicular discomfort 11/04/2015    Low back pain 06/04/2015    Erectile dysfunction of non-organic origin 07/17/2014    Atherosclerosis of native artery of extremity with intermittent claudication ([facility] ) 05/21/2014    Peripheral vascular disease ([place] [address] ) 05/21/2014    Acute deep vein thrombosis (DVT) of both lower extremities ([place] [address] ) 03/24/2014    Pulmonary embolism with infarction ([place] [address] ) 03/21/2013     He  has a past surgical history that includes Vascular surgery; pr colonoscopy flx dx w/collj spec when pfrmd (N/A, 5/17/2016); Anterior compartment decompression; and Angioplasty  His family history includes Diabetes type II in his father and mother; Heart attack in his father and mother; Heart disease in his father; Lung cancer in his father and mother; Other in his mother  He  reports that he has quit smoking  He has never used smokeless tobacco  He reports that he uses drugs, including Marijuana  He reports that he does not drink alcohol  Current Outpatient Prescriptions   Medication Sig Dispense Refill    Aspirin (ASPIR-81 PO) Take by mouth   Cholecalciferol (D3 DOTS) 2000 UNITS TBDP Take by mouth   cilostazol (PLETAL) 100 mg tablet Take 100 mg by mouth 2 (two) times a day   fluticasone-vilanterol (BREO ELLIPTA) 100-25 mcg/inh inhaler Inhale 1 puff daily Rinse mouth after use   3 Inhaler 1    lisinopril (ZESTRIL) 20 mg tablet Take 1 tablet by mouth daily      meloxicam (MOBIC) 7 5 mg tablet Take 1 tablet by mouth 2 (two) times a day as needed      meloxicam (MOBIC) 7 5 mg tablet Take 1 tablet (7 5 mg total) by mouth daily 90 tablet 0    metoprolol tartrate (LOPRESSOR) 25 mg tablet Take 1 tablet (25 mg total) by mouth every 12 (twelve) hours 180 tablet 1    pravastatin (PRAVACHOL) 40 mg tablet Take 1 tablet (40 mg total) by mouth daily 90 tablet 0    rivaroxaban (XARELTO) 20 mg tablet Take 1 tablet (20 mg total) by mouth daily 90 tablet 1    traMADol (ULTRAM) 50 mg tablet Take 1 tablet (50 mg total) by mouth 2 (two) times a day 30 tablet 0     No current facility-administered medications for this visit  Current Outpatient Prescriptions on File Prior to Visit   Medication Sig    Aspirin (ASPIR-81 PO) Take by mouth   Cholecalciferol (D3 DOTS) 2000 UNITS TBDP Take by mouth   cilostazol (PLETAL) 100 mg tablet Take 100 mg by mouth 2 (two) times a day   lisinopril (ZESTRIL) 20 mg tablet Take 1 tablet by mouth daily    meloxicam (MOBIC) 7 5 mg tablet Take 1 tablet by mouth 2 (two) times a day as needed    meloxicam (MOBIC) 7 5 mg tablet Take 1 tablet (7 5 mg total) by mouth daily    pravastatin (PRAVACHOL) 40 mg tablet Take 1 tablet (40 mg total) by mouth daily    rivaroxaban (XARELTO) 20 mg tablet Take 1 tablet (20 mg total) by mouth daily    traMADol (ULTRAM) 50 mg tablet Take 1 tablet (50 mg total) by mouth 2 (two) times a day    [DISCONTINUED] lisinopril (ZESTRIL) 20 mg tablet Take 1 tablet (20 mg total) by mouth daily     No current facility-administered medications on file prior to visit       Review of Systems   Respiratory: Positive for cough, shortness of breath and wheezing  Cardiovascular: Positive for leg swelling  Musculoskeletal: Positive for arthralgias and back pain  All other systems reviewed and are negative  Objective:      /60 (BP Location: Left arm, Patient Position: Sitting, Cuff Size: Standard)   Pulse 73   Temp (!) 96 8 °F (36 °C) (Tympanic)   Resp 18   Ht 5' 1" (1 549 m)   Wt 78 9 kg (174 lb)   SpO2 97%   BMI 32 88 kg/m²          Physical Exam   Constitutional: He is oriented to person, place, and time  He appears well-developed  HENT:   Head: Normocephalic     Right Ear: External ear normal    Left Ear: External ear normal    Nose: Nose normal    Mouth/Throat: Oropharynx is clear and moist    Eyes: Pupils are equal, round, and reactive to light  Conjunctivae and EOM are normal    Neck: Normal range of motion  Neck supple  No thyromegaly present  Cardiovascular: Normal rate, regular rhythm and normal heart sounds  Pulmonary/Chest: Effort normal and breath sounds normal    Abdominal: Soft  There is no tenderness  There is no rebound and no guarding  Musculoskeletal: Normal range of motion  Neurological: He is alert and oriented to person, place, and time  He has normal reflexes  Skin: Skin is dry  Psychiatric: He has a normal mood and affect  Nursing note and vitals reviewed

## 2018-11-19 DIAGNOSIS — I82.413 ACUTE DEEP VEIN THROMBOSIS (DVT) OF FEMORAL VEIN OF BOTH LOWER EXTREMITIES (HCC): ICD-10-CM

## 2018-11-19 DIAGNOSIS — I26.99 PULMONARY EMBOLISM WITH INFARCTION (HCC): ICD-10-CM

## 2018-12-18 DIAGNOSIS — I10 BENIGN ESSENTIAL HYPERTENSION: ICD-10-CM

## 2018-12-18 RX ORDER — PRAVASTATIN SODIUM 40 MG
TABLET ORAL
Qty: 90 TABLET | Refills: 1 | Status: SHIPPED | OUTPATIENT
Start: 2018-12-18 | End: 2019-11-04 | Stop reason: SDUPTHER

## 2018-12-18 RX ORDER — LISINOPRIL 20 MG/1
TABLET ORAL
Qty: 90 TABLET | Refills: 1 | Status: SHIPPED | OUTPATIENT
Start: 2018-12-18 | End: 2019-05-07 | Stop reason: SDUPTHER

## 2019-01-14 DIAGNOSIS — R07.81 RIB PAIN ON RIGHT SIDE: ICD-10-CM

## 2019-01-14 DIAGNOSIS — I26.99 PULMONARY EMBOLISM WITH INFARCTION (HCC): ICD-10-CM

## 2019-01-15 RX ORDER — MELOXICAM 7.5 MG/1
7.5 TABLET ORAL DAILY
Qty: 90 TABLET | Refills: 0 | Status: SHIPPED | OUTPATIENT
Start: 2019-01-15 | End: 2019-04-16 | Stop reason: SDUPTHER

## 2019-03-18 DIAGNOSIS — J41.8 MIXED SIMPLE AND MUCOPURULENT CHRONIC BRONCHITIS (HCC): ICD-10-CM

## 2019-03-18 RX ORDER — FLUTICASONE FUROATE AND VILANTEROL 100; 25 UG/1; UG/1
1 POWDER RESPIRATORY (INHALATION) DAILY
Qty: 3 INHALER | Refills: 0 | Status: SHIPPED | OUTPATIENT
Start: 2019-03-18 | End: 2019-06-05 | Stop reason: SDUPTHER

## 2019-03-28 DIAGNOSIS — I10 BENIGN ESSENTIAL HYPERTENSION: ICD-10-CM

## 2019-04-16 DIAGNOSIS — I26.99 PULMONARY EMBOLISM WITH INFARCTION (HCC): ICD-10-CM

## 2019-04-16 DIAGNOSIS — R07.81 RIB PAIN ON RIGHT SIDE: ICD-10-CM

## 2019-04-16 RX ORDER — MELOXICAM 7.5 MG/1
7.5 TABLET ORAL DAILY
Qty: 90 TABLET | Refills: 0 | Status: SHIPPED | OUTPATIENT
Start: 2019-04-16 | End: 2019-07-15 | Stop reason: SDUPTHER

## 2019-04-29 DIAGNOSIS — I26.99 PULMONARY EMBOLISM WITH INFARCTION (HCC): ICD-10-CM

## 2019-04-29 DIAGNOSIS — I82.413 ACUTE DEEP VEIN THROMBOSIS (DVT) OF FEMORAL VEIN OF BOTH LOWER EXTREMITIES (HCC): ICD-10-CM

## 2019-04-30 RX ORDER — RIVAROXABAN 20 MG/1
TABLET, FILM COATED ORAL
Qty: 90 TABLET | Refills: 4 | Status: SHIPPED | OUTPATIENT
Start: 2019-04-30 | End: 2019-05-07 | Stop reason: SDUPTHER

## 2019-05-07 ENCOUNTER — OFFICE VISIT (OUTPATIENT)
Dept: FAMILY MEDICINE CLINIC | Facility: CLINIC | Age: 64
End: 2019-05-07

## 2019-05-07 VITALS
SYSTOLIC BLOOD PRESSURE: 150 MMHG | HEIGHT: 61 IN | DIASTOLIC BLOOD PRESSURE: 72 MMHG | RESPIRATION RATE: 18 BRPM | OXYGEN SATURATION: 97 % | HEART RATE: 67 BPM | WEIGHT: 171 LBS | BODY MASS INDEX: 32.28 KG/M2 | TEMPERATURE: 96.8 F

## 2019-05-07 DIAGNOSIS — I82.413 ACUTE DEEP VEIN THROMBOSIS (DVT) OF FEMORAL VEIN OF BOTH LOWER EXTREMITIES (HCC): ICD-10-CM

## 2019-05-07 DIAGNOSIS — I70.219 ATHEROSCLEROSIS OF NATIVE ARTERY OF EXTREMITY WITH INTERMITTENT CLAUDICATION, UNSPECIFIED EXTREMITY (HCC): Primary | ICD-10-CM

## 2019-05-07 DIAGNOSIS — I10 BENIGN ESSENTIAL HYPERTENSION: ICD-10-CM

## 2019-05-07 DIAGNOSIS — I26.99 PULMONARY EMBOLISM WITH INFARCTION (HCC): ICD-10-CM

## 2019-05-07 PROCEDURE — 99214 OFFICE O/P EST MOD 30 MIN: CPT | Performed by: FAMILY MEDICINE

## 2019-05-07 RX ORDER — LISINOPRIL 20 MG/1
20 TABLET ORAL DAILY
Qty: 90 TABLET | Refills: 1 | Status: SHIPPED | OUTPATIENT
Start: 2019-05-07 | End: 2019-10-16 | Stop reason: SDUPTHER

## 2019-05-20 ENCOUNTER — TELEPHONE (OUTPATIENT)
Dept: FAMILY MEDICINE CLINIC | Facility: CLINIC | Age: 64
End: 2019-05-20

## 2019-05-20 DIAGNOSIS — I10 BENIGN ESSENTIAL HYPERTENSION: Primary | ICD-10-CM

## 2019-05-22 ENCOUNTER — APPOINTMENT (OUTPATIENT)
Dept: LAB | Facility: CLINIC | Age: 64
End: 2019-05-22
Payer: MEDICARE

## 2019-05-22 DIAGNOSIS — I10 BENIGN ESSENTIAL HYPERTENSION: ICD-10-CM

## 2019-05-22 LAB
ALBUMIN SERPL BCP-MCNC: 3.5 G/DL (ref 3.5–5)
ALP SERPL-CCNC: 58 U/L (ref 46–116)
ALT SERPL W P-5'-P-CCNC: 26 U/L (ref 12–78)
ANION GAP SERPL CALCULATED.3IONS-SCNC: 2 MMOL/L (ref 4–13)
AST SERPL W P-5'-P-CCNC: 24 U/L (ref 5–45)
BASOPHILS # BLD AUTO: 0.05 THOUSANDS/ΜL (ref 0–0.1)
BASOPHILS NFR BLD AUTO: 1 % (ref 0–1)
BILIRUB SERPL-MCNC: 0.88 MG/DL (ref 0.2–1)
BUN SERPL-MCNC: 14 MG/DL (ref 5–25)
CALCIUM SERPL-MCNC: 9 MG/DL (ref 8.3–10.1)
CHLORIDE SERPL-SCNC: 108 MMOL/L (ref 100–108)
CHOLEST SERPL-MCNC: 182 MG/DL (ref 50–200)
CO2 SERPL-SCNC: 28 MMOL/L (ref 21–32)
CREAT SERPL-MCNC: 1.03 MG/DL (ref 0.6–1.3)
CREAT UR-MCNC: 144 MG/DL
EOSINOPHIL # BLD AUTO: 0.37 THOUSAND/ΜL (ref 0–0.61)
EOSINOPHIL NFR BLD AUTO: 4 % (ref 0–6)
ERYTHROCYTE [DISTWIDTH] IN BLOOD BY AUTOMATED COUNT: 13.3 % (ref 11.6–15.1)
GFR SERPL CREATININE-BSD FRML MDRD: 76 ML/MIN/1.73SQ M
GLUCOSE P FAST SERPL-MCNC: 90 MG/DL (ref 65–99)
HCT VFR BLD AUTO: 43.9 % (ref 36.5–49.3)
HDLC SERPL-MCNC: 41 MG/DL (ref 40–60)
HGB BLD-MCNC: 13.4 G/DL (ref 12–17)
IMM GRANULOCYTES # BLD AUTO: 0.01 THOUSAND/UL (ref 0–0.2)
IMM GRANULOCYTES NFR BLD AUTO: 0 % (ref 0–2)
LDLC SERPL CALC-MCNC: 120 MG/DL (ref 0–100)
LYMPHOCYTES # BLD AUTO: 4.07 THOUSANDS/ΜL (ref 0.6–4.47)
LYMPHOCYTES NFR BLD AUTO: 48 % (ref 14–44)
MCH RBC QN AUTO: 28.8 PG (ref 26.8–34.3)
MCHC RBC AUTO-ENTMCNC: 30.5 G/DL (ref 31.4–37.4)
MCV RBC AUTO: 94 FL (ref 82–98)
MICROALBUMIN UR-MCNC: 40.4 MG/L (ref 0–20)
MICROALBUMIN/CREAT 24H UR: 28 MG/G CREATININE (ref 0–30)
MONOCYTES # BLD AUTO: 0.65 THOUSAND/ΜL (ref 0.17–1.22)
MONOCYTES NFR BLD AUTO: 8 % (ref 4–12)
NEUTROPHILS # BLD AUTO: 3.33 THOUSANDS/ΜL (ref 1.85–7.62)
NEUTS SEG NFR BLD AUTO: 39 % (ref 43–75)
NONHDLC SERPL-MCNC: 141 MG/DL
NRBC BLD AUTO-RTO: 0 /100 WBCS
PLATELET # BLD AUTO: 208 THOUSANDS/UL (ref 149–390)
PMV BLD AUTO: 11.3 FL (ref 8.9–12.7)
POTASSIUM SERPL-SCNC: 5.3 MMOL/L (ref 3.5–5.3)
PROT SERPL-MCNC: 7.8 G/DL (ref 6.4–8.2)
RBC # BLD AUTO: 4.66 MILLION/UL (ref 3.88–5.62)
SODIUM SERPL-SCNC: 138 MMOL/L (ref 136–145)
TRIGL SERPL-MCNC: 106 MG/DL
TSH SERPL DL<=0.05 MIU/L-ACNC: 2.12 UIU/ML (ref 0.36–3.74)
WBC # BLD AUTO: 8.48 THOUSAND/UL (ref 4.31–10.16)

## 2019-05-22 PROCEDURE — 85025 COMPLETE CBC W/AUTO DIFF WBC: CPT

## 2019-05-22 PROCEDURE — 82570 ASSAY OF URINE CREATININE: CPT

## 2019-05-22 PROCEDURE — 80053 COMPREHEN METABOLIC PANEL: CPT

## 2019-05-22 PROCEDURE — 82043 UR ALBUMIN QUANTITATIVE: CPT

## 2019-05-22 PROCEDURE — 80061 LIPID PANEL: CPT

## 2019-05-22 PROCEDURE — 84443 ASSAY THYROID STIM HORMONE: CPT

## 2019-05-22 PROCEDURE — 36415 COLL VENOUS BLD VENIPUNCTURE: CPT

## 2019-06-05 DIAGNOSIS — J41.8 MIXED SIMPLE AND MUCOPURULENT CHRONIC BRONCHITIS (HCC): ICD-10-CM

## 2019-06-06 RX ORDER — FLUTICASONE FUROATE AND VILANTEROL TRIFENATATE 100; 25 UG/1; UG/1
POWDER RESPIRATORY (INHALATION)
Qty: 180 INHALER | Refills: 1 | Status: SHIPPED | OUTPATIENT
Start: 2019-06-06 | End: 2019-10-28 | Stop reason: SDUPTHER

## 2019-07-15 DIAGNOSIS — I26.99 PULMONARY EMBOLISM WITH INFARCTION (HCC): ICD-10-CM

## 2019-07-15 DIAGNOSIS — R07.81 RIB PAIN ON RIGHT SIDE: ICD-10-CM

## 2019-07-16 RX ORDER — MELOXICAM 7.5 MG/1
TABLET ORAL
Qty: 90 TABLET | Refills: 0 | Status: SHIPPED | OUTPATIENT
Start: 2019-07-16

## 2019-10-14 DIAGNOSIS — I10 BENIGN ESSENTIAL HYPERTENSION: ICD-10-CM

## 2019-10-14 DIAGNOSIS — R07.81 RIB PAIN ON RIGHT SIDE: ICD-10-CM

## 2019-10-14 DIAGNOSIS — I26.99 PULMONARY EMBOLISM WITH INFARCTION (HCC): ICD-10-CM

## 2019-10-15 RX ORDER — LISINOPRIL 20 MG/1
TABLET ORAL
Qty: 90 TABLET | Refills: 0 | OUTPATIENT
Start: 2019-10-15

## 2019-10-15 RX ORDER — MELOXICAM 7.5 MG/1
TABLET ORAL
Qty: 90 TABLET | Refills: 0 | OUTPATIENT
Start: 2019-10-15

## 2019-10-16 DIAGNOSIS — I10 BENIGN ESSENTIAL HYPERTENSION: ICD-10-CM

## 2019-10-16 RX ORDER — LISINOPRIL 20 MG/1
20 TABLET ORAL DAILY
Qty: 30 TABLET | Refills: 0 | Status: SHIPPED | OUTPATIENT
Start: 2019-10-16 | End: 2019-11-04 | Stop reason: SDUPTHER

## 2019-10-16 NOTE — TELEPHONE ENCOUNTER
LM on VM for pt to contact office regarding one of his meds  [address] and spoke with [facility] / technician  I let her know refill will not be sent right now for meloxicam until pt is seen in office again  She will update pt's file

## 2019-10-16 NOTE — TELEPHONE ENCOUNTER
Sent Lisinopril 30 day supply  Meloxicam is NOT a life sustaining medication so will hold off till seen at the appointment

## 2019-10-28 DIAGNOSIS — J41.8 MIXED SIMPLE AND MUCOPURULENT CHRONIC BRONCHITIS (HCC): ICD-10-CM

## 2019-10-29 RX ORDER — FLUTICASONE FUROATE AND VILANTEROL TRIFENATATE 100; 25 UG/1; UG/1
POWDER RESPIRATORY (INHALATION)
Qty: 180 INHALER | Refills: 4 | Status: SHIPPED | OUTPATIENT
Start: 2019-10-29 | End: 2020-04-29 | Stop reason: SDUPTHER

## 2019-11-04 ENCOUNTER — APPOINTMENT (OUTPATIENT)
Dept: LAB | Facility: CLINIC | Age: 64
End: 2019-11-04
Payer: MEDICARE

## 2019-11-04 ENCOUNTER — OFFICE VISIT (OUTPATIENT)
Dept: FAMILY MEDICINE CLINIC | Facility: CLINIC | Age: 64
End: 2019-11-04

## 2019-11-04 VITALS
HEART RATE: 64 BPM | OXYGEN SATURATION: 99 % | HEIGHT: 61 IN | DIASTOLIC BLOOD PRESSURE: 70 MMHG | BODY MASS INDEX: 31.72 KG/M2 | SYSTOLIC BLOOD PRESSURE: 140 MMHG | TEMPERATURE: 98 F | WEIGHT: 168 LBS | RESPIRATION RATE: 18 BRPM

## 2019-11-04 DIAGNOSIS — I73.9 INTERMITTENT CLAUDICATION (HCC): ICD-10-CM

## 2019-11-04 DIAGNOSIS — E55.9 VITAMIN D DEFICIENCY: ICD-10-CM

## 2019-11-04 DIAGNOSIS — I10 BENIGN ESSENTIAL HYPERTENSION: ICD-10-CM

## 2019-11-04 DIAGNOSIS — R09.81 NASAL CONGESTION: ICD-10-CM

## 2019-11-04 DIAGNOSIS — I82.413 ACUTE DEEP VEIN THROMBOSIS (DVT) OF FEMORAL VEIN OF BOTH LOWER EXTREMITIES (HCC): ICD-10-CM

## 2019-11-04 DIAGNOSIS — I26.99 PULMONARY EMBOLISM WITH INFARCTION (HCC): ICD-10-CM

## 2019-11-04 DIAGNOSIS — Z23 NEED FOR INFLUENZA VACCINATION: ICD-10-CM

## 2019-11-04 DIAGNOSIS — I10 BENIGN ESSENTIAL HYPERTENSION: Primary | ICD-10-CM

## 2019-11-04 LAB
25(OH)D3 SERPL-MCNC: 26.5 NG/ML (ref 30–100)
ALBUMIN SERPL BCP-MCNC: 3.5 G/DL (ref 3.5–5)
ALP SERPL-CCNC: 58 U/L (ref 46–116)
ALT SERPL W P-5'-P-CCNC: 21 U/L (ref 12–78)
ANION GAP SERPL CALCULATED.3IONS-SCNC: 3 MMOL/L (ref 4–13)
AST SERPL W P-5'-P-CCNC: 20 U/L (ref 5–45)
BASOPHILS # BLD AUTO: 0.08 THOUSANDS/ΜL (ref 0–0.1)
BASOPHILS NFR BLD AUTO: 1 % (ref 0–1)
BILIRUB SERPL-MCNC: 0.45 MG/DL (ref 0.2–1)
BUN SERPL-MCNC: 29 MG/DL (ref 5–25)
CALCIUM SERPL-MCNC: 9.1 MG/DL (ref 8.3–10.1)
CHLORIDE SERPL-SCNC: 108 MMOL/L (ref 100–108)
CHOLEST SERPL-MCNC: 167 MG/DL (ref 50–200)
CO2 SERPL-SCNC: 29 MMOL/L (ref 21–32)
CREAT SERPL-MCNC: 1.29 MG/DL (ref 0.6–1.3)
CREAT UR-MCNC: 111 MG/DL
EOSINOPHIL # BLD AUTO: 0.26 THOUSAND/ΜL (ref 0–0.61)
EOSINOPHIL NFR BLD AUTO: 3 % (ref 0–6)
ERYTHROCYTE [DISTWIDTH] IN BLOOD BY AUTOMATED COUNT: 13 % (ref 11.6–15.1)
GFR SERPL CREATININE-BSD FRML MDRD: 58 ML/MIN/1.73SQ M
GLUCOSE P FAST SERPL-MCNC: 86 MG/DL (ref 65–99)
HCT VFR BLD AUTO: 40.7 % (ref 36.5–49.3)
HDLC SERPL-MCNC: 47 MG/DL
HGB BLD-MCNC: 12.7 G/DL (ref 12–17)
IMM GRANULOCYTES # BLD AUTO: 0.02 THOUSAND/UL (ref 0–0.2)
IMM GRANULOCYTES NFR BLD AUTO: 0 % (ref 0–2)
LDLC SERPL CALC-MCNC: 107 MG/DL (ref 0–100)
LYMPHOCYTES # BLD AUTO: 4.35 THOUSANDS/ΜL (ref 0.6–4.47)
LYMPHOCYTES NFR BLD AUTO: 54 % (ref 14–44)
MCH RBC QN AUTO: 29.9 PG (ref 26.8–34.3)
MCHC RBC AUTO-ENTMCNC: 31.2 G/DL (ref 31.4–37.4)
MCV RBC AUTO: 96 FL (ref 82–98)
MICROALBUMIN UR-MCNC: 28.2 MG/L (ref 0–20)
MICROALBUMIN/CREAT 24H UR: 25 MG/G CREATININE (ref 0–30)
MONOCYTES # BLD AUTO: 0.6 THOUSAND/ΜL (ref 0.17–1.22)
MONOCYTES NFR BLD AUTO: 7 % (ref 4–12)
NEUTROPHILS # BLD AUTO: 2.85 THOUSANDS/ΜL (ref 1.85–7.62)
NEUTS SEG NFR BLD AUTO: 35 % (ref 43–75)
NONHDLC SERPL-MCNC: 120 MG/DL
NRBC BLD AUTO-RTO: 0 /100 WBCS
PLATELET # BLD AUTO: 195 THOUSANDS/UL (ref 149–390)
PMV BLD AUTO: 11.5 FL (ref 8.9–12.7)
POTASSIUM SERPL-SCNC: 5 MMOL/L (ref 3.5–5.3)
PROT SERPL-MCNC: 7.8 G/DL (ref 6.4–8.2)
RBC # BLD AUTO: 4.25 MILLION/UL (ref 3.88–5.62)
SODIUM SERPL-SCNC: 140 MMOL/L (ref 136–145)
TRIGL SERPL-MCNC: 64 MG/DL
TSH SERPL DL<=0.05 MIU/L-ACNC: 0.44 UIU/ML (ref 0.36–3.74)
WBC # BLD AUTO: 8.16 THOUSAND/UL (ref 4.31–10.16)

## 2019-11-04 PROCEDURE — G0008 ADMIN INFLUENZA VIRUS VAC: HCPCS | Performed by: FAMILY MEDICINE

## 2019-11-04 PROCEDURE — 82306 VITAMIN D 25 HYDROXY: CPT

## 2019-11-04 PROCEDURE — 90682 RIV4 VACC RECOMBINANT DNA IM: CPT | Performed by: FAMILY MEDICINE

## 2019-11-04 PROCEDURE — 82570 ASSAY OF URINE CREATININE: CPT

## 2019-11-04 PROCEDURE — 36415 COLL VENOUS BLD VENIPUNCTURE: CPT

## 2019-11-04 PROCEDURE — 99214 OFFICE O/P EST MOD 30 MIN: CPT | Performed by: FAMILY MEDICINE

## 2019-11-04 PROCEDURE — 82043 UR ALBUMIN QUANTITATIVE: CPT

## 2019-11-04 PROCEDURE — 80053 COMPREHEN METABOLIC PANEL: CPT

## 2019-11-04 PROCEDURE — 80061 LIPID PANEL: CPT

## 2019-11-04 PROCEDURE — 84443 ASSAY THYROID STIM HORMONE: CPT

## 2019-11-04 PROCEDURE — 85025 COMPLETE CBC W/AUTO DIFF WBC: CPT

## 2019-11-04 RX ORDER — FLUTICASONE PROPIONATE 50 MCG
1 SPRAY, SUSPENSION (ML) NASAL DAILY
Qty: 1 BOTTLE | Refills: 1 | Status: SHIPPED | OUTPATIENT
Start: 2019-11-04 | End: 2019-12-30 | Stop reason: SDUPTHER

## 2019-11-04 RX ORDER — PRAVASTATIN SODIUM 40 MG
40 TABLET ORAL DAILY
Qty: 90 TABLET | Refills: 1 | Status: SHIPPED | OUTPATIENT
Start: 2019-11-04 | End: 2020-04-24

## 2019-11-04 RX ORDER — LORATADINE 10 MG/1
10 TABLET ORAL DAILY
Qty: 90 TABLET | Refills: 1 | Status: SHIPPED | OUTPATIENT
Start: 2019-11-04

## 2019-11-04 RX ORDER — LISINOPRIL 20 MG/1
20 TABLET ORAL DAILY
Qty: 90 TABLET | Refills: 1 | Status: SHIPPED | OUTPATIENT
Start: 2019-11-04 | End: 2020-04-24

## 2019-11-04 NOTE — ASSESSMENT & PLAN NOTE
Discussed importance of controlling risk factors such as weight and low fat diet  Counseled on importance of weight loss  Counseled on importance of low fat diet  Check FLP

## 2019-11-04 NOTE — PROGRESS NOTES
Assessment/Plan:    Pulmonary embolism with infarction ([facility] [address] )  Continue Xarelto    Intermitt[facility] claudication ([facility] )  Discussed importance of controlling risk factors such as weight and low fat diet  Counseled on importance of weight loss  Counseled on importance of low fat diet  Check FLP        Diagnoses and all orders for this visit:    Benign essential hypertension  -     metoprolol tartrate (LOPRESSOR) 25 mg tablet; Take 1 tablet (25 mg total) by mouth every 12 (twelve) hours  -     lisinopril (ZESTRIL) 20 mg tablet; Take 1 tablet (20 mg total) by mouth daily  -     pravastatin (PRAVACHOL) 40 mg tablet; Take 1 tablet (40 mg total) by mouth daily  -     Lipid panel; Future  -     Comprehensive metabolic panel; Future  -     CBC and differential; Future  -     Microalbumin / creatinine urine ratio; Future  -     TSH, 3rd generation with Free T4 reflex; Future    Pulmonary embolism with infarction (HCC)  -     rivaroxaban (XARELTO) 20 mg tablet; Take 1 tablet (20 mg total) by mouth daily    Acute deep vein thrombosis (DVT) of femoral vein of both lower extremities (HCC)  -     rivaroxaban (XARELTO) 20 mg tablet; Take 1 tablet (20 mg total) by mouth daily    Vitamin D deficiency  -     Vitamin D 25 hydroxy; Future    Intermittent claudication (HCC)  -     Lipid panel; Future    Nasal congestion  -     loratadine (CLARITIN) 10 mg tablet; Take 1 tablet (10 mg total) by mouth daily  -     fluticasone (FLONASE) 50 mcg/act nasal spray; 1 spray into each nostril daily    Need for influenza vaccination  -     influenza vaccine, [id number], quadrivalent, recombinant, PF, 0 5 mL, for patients 18 yr+ (FLUBLOK)          Subjective:      Patient ID: Harsha Hinojosa is a 58 y o  male  57 yo  male with HTN, Hyperlipidemia, DVT and PE, here today for follow up of chronic conditions  Complains today of L leg tingling   Happens sporadically, usually when he is sitting for more than an hour at time, tingling disappears when he gets up to walk around or shakes his legs  Also complains of small painful lump on posterior R side of scalp, tried squeezing it and thick material came out, hurts less now, but is constantly draining fluid  Has had URI symptoms for about 5 days with cough, sneezing, runny nose, congestion and pooping sensation in both ears  URI    The current episode started in the past 7 days  The problem has been unchanged  There has been no fever  Associated symptoms include congestion, a plugged ear sensation, rhinorrhea and sneezing  He has tried nothing for the symptoms  The following portions of the patient's history were reviewed and updated as appropriate: He  has a past medical history of DVT (deep venous thrombosis) ([facility] ) and Hypercholesterolemia  He   Patient Active Problem List    Diagnosis Date Noted    Nasal congestion 11/04/2019    Hypercholesterolemia 10/17/2018    Mixed simple and mucopurulent [facility] bronchitis ([facility] ) 10/17/2018    Rib pain on right side 03/19/2018    Abdominal adhesions 12/11/2017    Benign essential hypertension 01/18/2017    Aphagia 08/02/2016    Choking sensation 08/02/2016    Vitamin D deficiency 04/04/2016    Heel spur 03/17/2016    Arch pain, left 11/16/2015    Intermittent claudication (HCC) 11/16/2015    Limb swelling 11/16/2015    Tinea pedis of both feet 11/16/2015    Plantar fasciitis 11/04/2015    Testicular discomfort 11/04/2015    Low back pain 06/04/2015    Erectile dysfunction of non-organic origin 07/17/2014    Atherosclerosis of native artery of extremity with intermittent claudication ([facility] ) 05/21/2014    Peripheral vascular disease ([place] [address] ) 05/21/2014    Acute deep vein thrombosis (DVT) of both lower extremities ([facility] ) 03/24/2014    Pulmonary embolism with infarction ([place] [address] ) 03/21/2013     He  has a past surgical history that includes Vascular surgery; pr colonoscopy flx dx w/collj spec when pfrmd (N/A, 5/17/2016);  Anterior compartment decompression; and Angioplasty  His family history includes Diabetes type II in his father and mother; Heart attack in his father and mother; Heart disease in his father; Lung cancer in his father and mother; Other in his mother  He  reports that he has quit smoking  He has never used smokeless tobacco  He reports that he has current or past drug history  Drug: Marijuana  He reports that he does not drink alcohol  Current Outpatient Medications   Medication Sig Dispense Refill    Aspirin (ASPIR-81 PO) Take by mouth   BREO ELLIPTA 100-25 MCG/INH inhaler INHALE 1 PUFF BY MOUTH ONCE DAILY RINSE MOUTH AFTER  Inhaler 4    Cholecalciferol (D3 DOTS) 2000 UNITS TBDP Take by mouth   cilostazol (PLETAL) 100 mg tablet Take 100 mg by mouth 2 (two) times a day   fluticasone (FLONASE) 50 mcg/act nasal spray 1 spray into each nostril daily 1 Bottle 1    lisinopril (ZESTRIL) 20 mg tablet Take 1 tablet by mouth daily      lisinopril (ZESTRIL) 20 mg tablet Take 1 tablet (20 mg total) by mouth daily 90 tablet 1    loratadine (CLARITIN) 10 mg tablet Take 1 tablet (10 mg total) by mouth daily 90 tablet 1    meloxicam (MOBIC) 7 5 mg tablet Take 1 tablet by mouth 2 (two) times a day as needed      meloxicam (MOBIC) 7 5 mg tablet TAKE ONE TABLET BY MOUTH ONCE DAILY 90 tablet 0    metoprolol tartrate (LOPRESSOR) 25 mg tablet Take 1 tablet (25 mg total) by mouth every 12 (twelve) hours 180 tablet 1    pravastatin (PRAVACHOL) 40 mg tablet Take 1 tablet (40 mg total) by mouth daily 90 tablet 1    rivaroxaban (XARELTO) 20 mg tablet Take 1 tablet (20 mg total) by mouth daily 90 tablet 4    traMADol (ULTRAM) 50 mg tablet Take 1 tablet (50 mg total) by mouth 2 (two) times a day (Patient taking differently: Take 50 mg by mouth daily ) 30 tablet 0     No current facility-administered medications for this visit        Current Outpatient Medications on File Prior to Visit   Medication Sig    Aspirin (ASPIR-81 PO) Take by mouth   BREO ELLIPTA 100-25 MCG/INH inhaler INHALE 1 PUFF BY MOUTH ONCE DAILY RINSE MOUTH AFTER USE    Cholecalciferol (D3 DOTS) 2000 UNITS TBDP Take by mouth   cilostazol (PLETAL) 100 mg tablet Take 100 mg by mouth 2 (two) times a day   lisinopril (ZESTRIL) 20 mg tablet Take 1 tablet by mouth daily    meloxicam (MOBIC) 7 5 mg tablet Take 1 tablet by mouth 2 (two) times a day as needed    meloxicam (MOBIC) 7 5 mg tablet TAKE ONE TABLET BY MOUTH ONCE DAILY    traMADol (ULTRAM) 50 mg tablet Take 1 tablet (50 mg total) by mouth 2 (two) times a day (Patient taking differently: Take 50 mg by mouth daily )    [DISCONTINUED] lisinopril (ZESTRIL) 20 mg tablet Take 1 tablet (20 mg total) by mouth daily    [DISCONTINUED] metoprolol tartrate (LOPRESSOR) 25 mg tablet Take 1 tablet (25 mg total) by mouth every 12 (twelve) hours    [DISCONTINUED] pravastatin (PRAVACHOL) 40 mg tablet TAKE ONE TABLET BY MOUTH ONCE DAILY    [DISCONTINUED] rivaroxaban (XARELTO) 20 mg tablet Take 1 tablet (20 mg total) by mouth daily     No current facility-administered medications on file prior to visit       Review of Systems   Constitutional: Negative for chills and fever  HENT: Positive for congestion, rhinorrhea and sneezing  Musculoskeletal: Positive for back pain  All other systems reviewed and are negative  Objective:      /70 (BP Location: Right arm, Patient Position: Sitting, Cuff Size: Standard)   Pulse 64   Temp 98 °F (36 7 °C) (Temporal)   Resp 18   Ht 5' 1" (1 549 m)   Wt 76 2 kg (168 lb)   SpO2 99%   BMI 31 74 kg/m²          Physical Exam   Constitutional: He is oriented to person, place, and time  He appears well-developed  HENT:   Head: Normocephalic  Right Ear: External ear normal    Left Ear: External ear normal    Nose: Mucosal edema and rhinorrhea present  Mouth/Throat: Posterior oropharyngeal edema present  Eyes: Pupils are equal, round, and reactive to light  Conjunctivae and EOM are normal    Neck: Normal range of motion  Neck supple  No thyromegaly present  Cardiovascular: Normal rate, regular rhythm and normal heart sounds  Pulmonary/Chest: Effort normal and breath sounds normal    Abdominal: Soft  There is no tenderness  There is no rebound and no guarding  Musculoskeletal: Normal range of motion  Neurological: He is alert and oriented to person, place, and time  He has normal reflexes  Skin: Skin is dry  Psychiatric: He has a normal mood and affect  Nursing note and vitals reviewed

## 2019-12-30 DIAGNOSIS — R09.81 NASAL CONGESTION: ICD-10-CM

## 2020-01-02 RX ORDER — FLUTICASONE PROPIONATE 50 MCG
SPRAY, SUSPENSION (ML) NASAL
Qty: 3 BOTTLE | Refills: 0 | Status: SHIPPED | OUTPATIENT
Start: 2020-01-02 | End: 2020-04-30 | Stop reason: SDUPTHER

## 2020-04-24 DIAGNOSIS — I10 BENIGN ESSENTIAL HYPERTENSION: ICD-10-CM

## 2020-04-24 RX ORDER — PRAVASTATIN SODIUM 40 MG
TABLET ORAL
Qty: 90 TABLET | Refills: 0 | Status: SHIPPED | OUTPATIENT
Start: 2020-04-24 | End: 2020-04-29 | Stop reason: SDUPTHER

## 2020-04-24 RX ORDER — LISINOPRIL 20 MG/1
TABLET ORAL
Qty: 90 TABLET | Refills: 0 | Status: SHIPPED | OUTPATIENT
Start: 2020-04-24 | End: 2020-04-29 | Stop reason: SDUPTHER

## 2020-04-29 ENCOUNTER — TELEMEDICINE (OUTPATIENT)
Dept: FAMILY MEDICINE CLINIC | Facility: CLINIC | Age: 65
End: 2020-04-29

## 2020-04-29 VITALS — BODY MASS INDEX: 31.74 KG/M2 | HEIGHT: 61 IN

## 2020-04-29 DIAGNOSIS — I10 BENIGN ESSENTIAL HYPERTENSION: Primary | ICD-10-CM

## 2020-04-29 DIAGNOSIS — Z11.59 ENCOUNTER FOR HEPATITIS C SCREENING TEST FOR LOW RISK PATIENT: ICD-10-CM

## 2020-04-29 DIAGNOSIS — I82.413 ACUTE DEEP VEIN THROMBOSIS (DVT) OF FEMORAL VEIN OF BOTH LOWER EXTREMITIES (HCC): ICD-10-CM

## 2020-04-29 DIAGNOSIS — Z11.4 ENCOUNTER FOR SCREENING FOR HUMAN IMMUNODEFICIENCY VIRUS (HIV): ICD-10-CM

## 2020-04-29 DIAGNOSIS — J41.8 MIXED SIMPLE AND MUCOPURULENT CHRONIC BRONCHITIS (HCC): ICD-10-CM

## 2020-04-29 DIAGNOSIS — I26.99 PULMONARY EMBOLISM WITH INFARCTION (HCC): ICD-10-CM

## 2020-04-29 DIAGNOSIS — F17.200 CURRENT SMOKER: ICD-10-CM

## 2020-04-29 PROBLEM — E66.811 OBESITY (BMI 30.0-34.9): Status: ACTIVE | Noted: 2020-04-29

## 2020-04-29 PROBLEM — E66.9 OBESITY (BMI 30.0-34.9): Status: ACTIVE | Noted: 2020-04-29

## 2020-04-29 PROCEDURE — 99214 OFFICE O/P EST MOD 30 MIN: CPT | Performed by: FAMILY MEDICINE

## 2020-04-29 RX ORDER — PRAVASTATIN SODIUM 40 MG
40 TABLET ORAL DAILY
Qty: 90 TABLET | Refills: 2 | Status: SHIPPED | OUTPATIENT
Start: 2020-04-29 | End: 2021-03-11

## 2020-04-29 RX ORDER — FLUTICASONE FUROATE AND VILANTEROL 100; 25 UG/1; UG/1
1 POWDER RESPIRATORY (INHALATION) DAILY
Qty: 180 INHALER | Refills: 4 | Status: SHIPPED | OUTPATIENT
Start: 2020-04-29 | End: 2021-03-17 | Stop reason: SDUPTHER

## 2020-04-29 RX ORDER — LISINOPRIL 20 MG/1
20 TABLET ORAL DAILY
Qty: 90 TABLET | Refills: 2 | Status: SHIPPED | OUTPATIENT
Start: 2020-04-29 | End: 2021-03-11

## 2020-04-30 DIAGNOSIS — R09.81 NASAL CONGESTION: ICD-10-CM

## 2020-04-30 RX ORDER — FLUTICASONE PROPIONATE 50 MCG
1 SPRAY, SUSPENSION (ML) NASAL DAILY
Qty: 3 BOTTLE | Refills: 0 | Status: SHIPPED | OUTPATIENT
Start: 2020-04-30 | End: 2021-01-04

## 2020-12-31 DIAGNOSIS — R09.81 NASAL CONGESTION: ICD-10-CM

## 2021-01-04 RX ORDER — FLUTICASONE PROPIONATE 50 MCG
SPRAY, SUSPENSION (ML) NASAL
Qty: 3 BOTTLE | Refills: 1 | Status: SHIPPED | OUTPATIENT
Start: 2021-01-04 | End: 2022-01-04

## 2021-03-10 DIAGNOSIS — I10 BENIGN ESSENTIAL HYPERTENSION: ICD-10-CM

## 2021-03-11 RX ORDER — PRAVASTATIN SODIUM 40 MG
TABLET ORAL
Qty: 30 TABLET | Refills: 0 | Status: SHIPPED | OUTPATIENT
Start: 2021-03-11 | End: 2021-03-17 | Stop reason: SDUPTHER

## 2021-03-11 RX ORDER — LISINOPRIL 20 MG/1
TABLET ORAL
Qty: 30 TABLET | Refills: 0 | Status: SHIPPED | OUTPATIENT
Start: 2021-03-11 | End: 2021-03-17 | Stop reason: SDUPTHER

## 2021-03-17 ENCOUNTER — APPOINTMENT (OUTPATIENT)
Dept: LAB | Facility: CLINIC | Age: 66
End: 2021-03-17
Payer: MEDICARE

## 2021-03-17 ENCOUNTER — OFFICE VISIT (OUTPATIENT)
Dept: FAMILY MEDICINE CLINIC | Facility: CLINIC | Age: 66
End: 2021-03-17

## 2021-03-17 VITALS
HEIGHT: 61 IN | OXYGEN SATURATION: 98 % | HEART RATE: 83 BPM | SYSTOLIC BLOOD PRESSURE: 158 MMHG | DIASTOLIC BLOOD PRESSURE: 80 MMHG | BODY MASS INDEX: 31.55 KG/M2 | WEIGHT: 167.1 LBS | TEMPERATURE: 97.6 F | RESPIRATION RATE: 20 BRPM

## 2021-03-17 DIAGNOSIS — E55.9 VITAMIN D DEFICIENCY: Primary | ICD-10-CM

## 2021-03-17 DIAGNOSIS — I26.99 PULMONARY EMBOLISM WITH INFARCTION (HCC): ICD-10-CM

## 2021-03-17 DIAGNOSIS — J41.8 MIXED SIMPLE AND MUCOPURULENT CHRONIC BRONCHITIS (HCC): ICD-10-CM

## 2021-03-17 DIAGNOSIS — R06.02 SOB (SHORTNESS OF BREATH): ICD-10-CM

## 2021-03-17 DIAGNOSIS — Z11.4 ENCOUNTER FOR SCREENING FOR HUMAN IMMUNODEFICIENCY VIRUS (HIV): ICD-10-CM

## 2021-03-17 DIAGNOSIS — E55.9 VITAMIN D DEFICIENCY: ICD-10-CM

## 2021-03-17 DIAGNOSIS — I10 BENIGN ESSENTIAL HYPERTENSION: ICD-10-CM

## 2021-03-17 DIAGNOSIS — I82.413 ACUTE DEEP VEIN THROMBOSIS (DVT) OF FEMORAL VEIN OF BOTH LOWER EXTREMITIES (HCC): ICD-10-CM

## 2021-03-17 DIAGNOSIS — Z11.59 ENCOUNTER FOR HEPATITIS C SCREENING TEST FOR LOW RISK PATIENT: ICD-10-CM

## 2021-03-17 LAB
25(OH)D3 SERPL-MCNC: 18.5 NG/ML (ref 30–100)
CHOLEST SERPL-MCNC: 187 MG/DL (ref 50–200)
HCV AB SER QL: NORMAL
HDLC SERPL-MCNC: 42 MG/DL
LDLC SERPL CALC-MCNC: 127 MG/DL (ref 0–100)
NONHDLC SERPL-MCNC: 145 MG/DL
TRIGL SERPL-MCNC: 92 MG/DL
TSH SERPL DL<=0.05 MIU/L-ACNC: 0.98 UIU/ML (ref 0.36–3.74)

## 2021-03-17 PROCEDURE — 82570 ASSAY OF URINE CREATININE: CPT

## 2021-03-17 PROCEDURE — 82306 VITAMIN D 25 HYDROXY: CPT

## 2021-03-17 PROCEDURE — 80061 LIPID PANEL: CPT

## 2021-03-17 PROCEDURE — 99213 OFFICE O/P EST LOW 20 MIN: CPT | Performed by: FAMILY MEDICINE

## 2021-03-17 PROCEDURE — 87389 HIV-1 AG W/HIV-1&-2 AB AG IA: CPT

## 2021-03-17 PROCEDURE — 82043 UR ALBUMIN QUANTITATIVE: CPT

## 2021-03-17 PROCEDURE — 86803 HEPATITIS C AB TEST: CPT

## 2021-03-17 PROCEDURE — 36415 COLL VENOUS BLD VENIPUNCTURE: CPT

## 2021-03-17 PROCEDURE — 84443 ASSAY THYROID STIM HORMONE: CPT

## 2021-03-17 RX ORDER — PRAVASTATIN SODIUM 40 MG
40 TABLET ORAL DAILY
Qty: 90 TABLET | Refills: 3 | Status: SHIPPED | OUTPATIENT
Start: 2021-03-17 | End: 2021-09-17 | Stop reason: SDUPTHER

## 2021-03-17 RX ORDER — LISINOPRIL 20 MG/1
20 TABLET ORAL DAILY
Qty: 90 TABLET | Refills: 3 | Status: SHIPPED | OUTPATIENT
Start: 2021-03-17 | End: 2021-09-17 | Stop reason: SDUPTHER

## 2021-03-17 RX ORDER — ALBUTEROL SULFATE 90 UG/1
2 AEROSOL, METERED RESPIRATORY (INHALATION) EVERY 6 HOURS PRN
Qty: 3 INHALER | Refills: 3 | Status: SHIPPED | OUTPATIENT
Start: 2021-03-17 | End: 2021-09-17 | Stop reason: SDUPTHER

## 2021-03-17 RX ORDER — FLUTICASONE FUROATE AND VILANTEROL TRIFENATATE 100; 25 UG/1; UG/1
1 POWDER RESPIRATORY (INHALATION) DAILY
Qty: 180 INHALER | Refills: 4 | Status: SHIPPED | OUTPATIENT
Start: 2021-03-17 | End: 2021-09-17 | Stop reason: SDUPTHER

## 2021-03-17 NOTE — PROGRESS NOTES
Assessment/Plan:    Benign essential hypertension  Has not taken Lisinopril or Metoprolol this am  States he takes it as he feels he needs it  Dicussed importance of taking medications as prescribed        Diagnoses and all orders for this visit:    Vitamin D deficiency  -     Vitamin D 25 hydroxy; Future    Pulmonary embolism with infarction (HCC)  -     rivaroxaban (Xarelto) 20 mg tablet; Take 1 tablet (20 mg total) by mouth daily    Acute deep vein thrombosis (DVT) of femoral vein of both lower extremities (HCC)  -     rivaroxaban (Xarelto) 20 mg tablet; Take 1 tablet (20 mg total) by mouth daily    Benign essential hypertension  -     pravastatin (PRAVACHOL) 40 mg tablet; Take 1 tablet (40 mg total) by mouth daily  -     metoprolol tartrate (LOPRESSOR) 25 mg tablet; Take 1 tablet (25 mg total) by mouth every 12 (twelve) hours  -     lisinopril (ZESTRIL) 20 mg tablet; Take 1 tablet (20 mg total) by mouth daily    Mixed simple and mucopurulent chronic bronchitis (HCC)  -     fluticasone-vilanterol (Breo Ellipta) 100-25 mcg/inh inhaler; Inhale 1 puff daily Rinse mouth after use    SOB (shortness of breath)  -     albuterol (Ventolin HFA) 90 mcg/act inhaler; Inhale 2 puffs every 6 (six) hours as needed for wheezing        Please have BW as ordered April 2020 done  Subjective:      Patient ID: Erna Spring is a 76 y o  male  59 yo  male with HTN, Hyperlipidemia, DVT and PE, here today for follow up of chronic conditions  Complains today of L leg tingling, unchanged from prior visit  Feels tired and complains of decreased appetite, feels he is not sleeping well but states his grandkids are doing virtual school from the house and this keeps him up and makes him anxious  The following portions of the patient's history were reviewed and updated as appropriate:   He  has a past medical history of DVT (deep venous thrombosis) ([address] ) and Hypercholesterolemia    He   Patient Active Problem List Diagnosis Date Noted    Obesity (BMI 30 0-34 9) 04/29/2020    Current smoker 04/29/2020    Nasal congestion 11/04/2019    Hypercholesterolemia 10/17/2018    Mixed simple and mucopurulent chronic bronchitis ([facility] ) 10/17/2018    Rib pain on right side 03/19/2018    Abdominal adhesions 12/11/2017    Benign essential hypertension 01/18/2017    Aphagia 08/02/2016    Choking sensation 08/02/2016    Vitamin D deficiency 04/04/2016    Heel spur 03/17/2016    Arch pain, left 11/16/2015    Intermittent claudication (HCC) 11/16/2015    Limb swelling 11/16/2015    Tinea pedis of both feet 11/16/2015    Plantar fasciitis 11/04/2015    Testicular discomfort 11/04/2015    Low back pain 06/04/2015    Erectile dysfunction of non-organic origin 07/17/2014    Atherosclerosis of native artery of extremity with intermittent claudication ([facility] ) 05/21/2014    Peripheral vascular disease ([place] [address] ) 05/21/2014    Acute deep vein thrombosis (DVT) of both lower extremities ([place] [address] ) 03/24/2014    Pulmonary embolism with infarction ([place] [address] ) 03/21/2013     He  has a past surgical history that includes Vascular surgery; pr colonoscopy flx dx w/collj spec when pfrmd (N/A, 5/17/2016); Anterior compartment decompression; and Angioplasty  His family history includes Diabetes type II in his father and mother; Heart attack in his father and mother; Heart disease in his father; Lung cancer in his father and mother; Other in his mother  He  reports that he has quit smoking  He has never used smokeless tobacco  He reports current drug use  Drug: Marijuana  He reports that he does not drink alcohol  Current Outpatient Medications   Medication Sig Dispense Refill    Aspirin (ASPIR-81 PO) Take by mouth   Cholecalciferol (D3 DOTS) 2000 UNITS TBDP Take by mouth   cilostazol (PLETAL) 100 mg tablet Take 100 mg by mouth 2 (two) times a day        fluticasone (FLONASE) 50 mcg/act nasal spray SPRAY 1 SPRAY INTO EACH NOSTRIL DAILY 3 Bottle 1  fluticasone-vilanterol (Breo Ellipta) 100-25 mcg/inh inhaler Inhale 1 puff daily Rinse mouth after use 180 Inhaler 4    lisinopril (ZESTRIL) 20 mg tablet Take 1 tablet by mouth daily      lisinopril (ZESTRIL) 20 mg tablet Take 1 tablet (20 mg total) by mouth daily 90 tablet 3    loratadine (CLARITIN) 10 mg tablet Take 1 tablet (10 mg total) by mouth daily 90 tablet 1    meloxicam (MOBIC) 7 5 mg tablet Take 1 tablet by mouth 2 (two) times a day as needed      meloxicam (MOBIC) 7 5 mg tablet TAKE ONE TABLET BY MOUTH ONCE DAILY 90 tablet 0    metoprolol tartrate (LOPRESSOR) 25 mg tablet Take 1 tablet (25 mg total) by mouth every 12 (twelve) hours 90 tablet 3    pravastatin (PRAVACHOL) 40 mg tablet Take 1 tablet (40 mg total) by mouth daily 90 tablet 3    rivaroxaban (Xarelto) 20 mg tablet Take 1 tablet (20 mg total) by mouth daily 90 tablet 4    traMADol (ULTRAM) 50 mg tablet Take 1 tablet (50 mg total) by mouth 2 (two) times a day 30 tablet 0    albuterol (Ventolin HFA) 90 mcg/act inhaler Inhale 2 puffs every 6 (six) hours as needed for wheezing 3 Inhaler 3     No current facility-administered medications for this visit  Current Outpatient Medications on File Prior to Visit   Medication Sig    Aspirin (ASPIR-81 PO) Take by mouth   Cholecalciferol (D3 DOTS) 2000 UNITS TBDP Take by mouth   cilostazol (PLETAL) 100 mg tablet Take 100 mg by mouth 2 (two) times a day      fluticasone (FLONASE) 50 mcg/act nasal spray SPRAY 1 SPRAY INTO EACH NOSTRIL DAILY    lisinopril (ZESTRIL) 20 mg tablet Take 1 tablet by mouth daily    loratadine (CLARITIN) 10 mg tablet Take 1 tablet (10 mg total) by mouth daily    meloxicam (MOBIC) 7 5 mg tablet Take 1 tablet by mouth 2 (two) times a day as needed    meloxicam (MOBIC) 7 5 mg tablet TAKE ONE TABLET BY MOUTH ONCE DAILY    traMADol (ULTRAM) 50 mg tablet Take 1 tablet (50 mg total) by mouth 2 (two) times a day    [DISCONTINUED] fluticasone-vilanterol (Breo Ellipta) 100-25 mcg/inh inhaler Inhale 1 puff daily Rinse mouth after use    [DISCONTINUED] lisinopril (ZESTRIL) 20 mg tablet TAKE ONE TABLET BY MOUTH ONCE DAILY    [DISCONTINUED] metoprolol tartrate (LOPRESSOR) 25 mg tablet TAKE ONE TABLET BY MOUTH EVERY 12 HOURS    [DISCONTINUED] pravastatin (PRAVACHOL) 40 mg tablet TAKE ONE TABLET BY MOUTH ONCE DAILY    [DISCONTINUED] rivaroxaban (Xarelto) 20 mg tablet Take 1 tablet (20 mg total) by mouth daily     No current facility-administered medications on file prior to visit       Review of Systems   Musculoskeletal: Positive for arthralgias and back pain  All other systems reviewed and are negative  Objective:      /80 (BP Location: Right arm, Patient Position: Sitting, Cuff Size: Standard)   Pulse 83   Temp 97 6 °F (36 4 °C) (Temporal)   Resp 20   Ht 5' 1" (1 549 m)   Wt 75 8 kg (167 lb 1 6 oz)   SpO2 98%   BMI 31 57 kg/m²          Physical Exam  Vitals signs and nursing note reviewed  Constitutional:       Appearance: He is well-developed  HENT:      Head: Normocephalic  Right Ear: External ear normal       Left Ear: External ear normal       Nose: Nose normal    Eyes:      Conjunctiva/sclera: Conjunctivae normal       Pupils: Pupils are equal, round, and reactive to light  Neck:      Musculoskeletal: Normal range of motion and neck supple  Thyroid: No thyromegaly  Cardiovascular:      Rate and Rhythm: Normal rate and regular rhythm  Heart sounds: Normal heart sounds  Pulmonary:      Effort: Pulmonary effort is normal       Breath sounds: Normal breath sounds  Abdominal:      Palpations: Abdomen is soft  Tenderness: There is no abdominal tenderness  There is no guarding or rebound  Musculoskeletal: Normal range of motion  Skin:     General: Skin is dry  Neurological:      Mental Status: He is alert and oriented to person, place, and time  Deep Tendon Reflexes: Reflexes are normal and symmetric

## 2021-03-17 NOTE — ASSESSMENT & PLAN NOTE
Has not taken Lisinopril or Metoprolol this am  States he takes it as he feels he needs it  Dicussed importance of taking medications as prescribed

## 2021-03-18 LAB
CREAT UR-MCNC: 171 MG/DL
HIV 1+2 AB+HIV1 P24 AG SERPL QL IA: NORMAL
MICROALBUMIN UR-MCNC: 14.6 MG/L (ref 0–20)
MICROALBUMIN/CREAT 24H UR: 9 MG/G CREATININE (ref 0–30)

## 2021-07-02 ENCOUNTER — HOSPITAL ENCOUNTER (EMERGENCY)
Facility: HOSPITAL | Age: 66
Discharge: HOME | End: 2021-07-02
Attending: EMERGENCY MEDICINE
Payer: MEDICARE

## 2021-07-02 VITALS
RESPIRATION RATE: 14 BRPM | HEART RATE: 63 BPM | OXYGEN SATURATION: 100 % | DIASTOLIC BLOOD PRESSURE: 80 MMHG | SYSTOLIC BLOOD PRESSURE: 183 MMHG | WEIGHT: 163.14 LBS | TEMPERATURE: 97.6 F | BODY MASS INDEX: 30.83 KG/M2

## 2021-07-02 DIAGNOSIS — S90.812A: Primary | ICD-10-CM

## 2021-07-02 PROCEDURE — 99282 EMERGENCY DEPT VISIT SF MDM: CPT

## 2021-07-02 PROCEDURE — 90471 IMMUNIZATION ADMIN: CPT

## 2021-07-02 PROCEDURE — 99282 EMERGENCY DEPT VISIT SF MDM: CPT | Performed by: EMERGENCY MEDICINE

## 2021-07-02 PROCEDURE — 90715 TDAP VACCINE 7 YRS/> IM: CPT | Performed by: EMERGENCY MEDICINE

## 2021-07-02 RX ADMIN — TETANUS TOXOID, REDUCED DIPHTHERIA TOXOID AND ACELLULAR PERTUSSIS VACCINE, ADSORBED 0.5 ML: 5; 2.5; 8; 8; 2.5 SUSPENSION INTRAMUSCULAR at 09:45

## 2021-07-02 NOTE — ED PROVIDER NOTES
History  Chief Complaint   Patient presents with    Foot Laceration     pt states he felt something on foot and tried to take it off with his other foot and cut himself  bleeding controlled  This is a 39-year-old male with a history of hypertension, hyperlipidemia who presents with an abrasion to the left foot  Patient states that he scratched his foot in the middle of the night while he was sleeping  He presents for evaluation  Denies fever/chills, nausea/vomiting, URI symptoms, chest pain, palpitations, shortness of breath, cough, neck pain, back pain, flank pain, abdominal pain, diarrhea, hematochezia, melena, dysuria  Prior to Admission Medications   Prescriptions Last Dose Informant Patient Reported? Taking? Aspirin (ASPIR-81 PO)   Yes No   Sig: Take by mouth  Cholecalciferol (D3 DOTS) 2000 UNITS TBDP   Yes No   Sig: Take by mouth  albuterol (Ventolin HFA) 90 mcg/act inhaler   No No   Sig: Inhale 2 puffs every 6 (six) hours as needed for wheezing   cilostazol (PLETAL) 100 mg tablet   Yes No   Sig: Take 100 mg by mouth 2 (two) times a day     fluticasone (FLONASE) 50 mcg/act nasal spray   No No   Sig: SPRAY 1 SPRAY INTO EACH NOSTRIL DAILY   fluticasone-vilanterol (Breo Ellipta) 100-25 mcg/inh inhaler   No No   Sig: Inhale 1 puff daily Rinse mouth after use   lisinopril (ZESTRIL) 20 mg tablet   Yes No   Sig: Take 1 tablet by mouth daily   lisinopril (ZESTRIL) 20 mg tablet   No No   Sig: Take 1 tablet (20 mg total) by mouth daily   loratadine (CLARITIN) 10 mg tablet   No No   Sig: Take 1 tablet (10 mg total) by mouth daily   meloxicam (MOBIC) 7 5 mg tablet   Yes No   Sig: Take 1 tablet by mouth 2 (two) times a day as needed   meloxicam (MOBIC) 7 5 mg tablet   No No   Sig: TAKE ONE TABLET BY MOUTH ONCE DAILY   metoprolol tartrate (LOPRESSOR) 25 mg tablet   No No   Sig: Take 1 tablet (25 mg total) by mouth every 12 (twelve) hours   pravastatin (PRAVACHOL) 40 mg tablet   No No   Sig: Take 1 tablet (40 mg total) by mouth daily   rivaroxaban (Xarelto) 20 mg tablet   No No   Sig: Take 1 tablet (20 mg total) by mouth daily   traMADol (ULTRAM) 50 mg tablet   No No   Sig: Take 1 tablet (50 mg total) by mouth 2 (two) times a day      Facility-Administered Medications: None       Past Medical History:   Diagnosis Date    DVT (deep venous thrombosis) (HCC)     Hypercholesterolemia        Past Surgical History:   Procedure Laterality Date    ANGIOPLASTY      orly lwr extr arter byp w/o throm w vein pacth angioplasty    ANTERIOR COMPARTMENT DECOMPRESSION      leg    NE COLONOSCOPY FLX DX W/COLLJ SPEC WHEN PFRMD N/A 5/17/2016    Procedure: COLONOSCOPY;  Surgeon: Varghese Gant MD;  Location: [facility]; Service: Gastroenterology    VASCULAR SURGERY         Family History   Problem Relation Age of Onset    Other Mother         brain tumor    Lung cancer Mother     Heart attack Mother     Diabetes type II Mother     Heart disease Father     Lung cancer Father     Heart attack Father     Diabetes type II Father      I have reviewed and agree with the history as documented  E-Cigarette/Vaping    E-Cigarette Use Never User      E-Cigarette/Vaping Substances     Social History     Tobacco Use    Smoking status: Former Smoker    Smokeless tobacco: Never Used   Vaping Use    Vaping Use: Never used   Substance Use Topics    Alcohol use: No    Drug use: Yes     Types: Marijuana       Review of Systems   Constitutional: Negative for chills and fever  HENT: Negative for congestion, rhinorrhea, sore throat and trouble swallowing  Respiratory: Negative for cough, chest tightness, shortness of breath and wheezing  Cardiovascular: Negative for chest pain and palpitations  Gastrointestinal: Negative for abdominal pain, blood in stool, diarrhea, nausea and vomiting  Musculoskeletal: Negative for back pain and neck pain  Skin: Positive for wound     All other systems reviewed and are negative  Physical Exam  Physical Exam  Constitutional:       Appearance: Normal appearance  He is well-developed  He is not ill-appearing or toxic-appearing  HENT:      Head: Normocephalic  Mouth/Throat:      Pharynx: Uvula midline  Tonsils: No tonsillar exudate  Eyes:      General: Lids are normal       Conjunctiva/sclera: Conjunctivae normal       Pupils: Pupils are equal, round, and reactive to light  Cardiovascular:      Rate and Rhythm: Normal rate and regular rhythm  Pulmonary:      Effort: Pulmonary effort is normal  No tachypnea  Abdominal:      General: There is no distension  Palpations: Abdomen is soft  Abdomen is not rigid  Musculoskeletal:      Comments: 1 cm superficial abrasion to the dorsum of the left foot  Neurological:      Mental Status: He is alert  Psychiatric:         Speech: Speech normal          Behavior: Behavior normal  Behavior is cooperative  Vital Signs  ED Triage Vitals [07/02/21 0924]   Temperature Pulse Respirations Blood Pressure SpO2   97 6 °F (36 4 °C) 63 14 (!) 183/80 100 %      Temp Source Heart Rate Source Patient Position - Orthostatic VS BP Location FiO2 (%)   Oral Monitor Sitting Left arm --      Pain Score       No Pain           Vitals:    07/02/21 0924   BP: (!) 183/80   Pulse: 63   Patient Position - Orthostatic VS: Sitting         Visual Acuity      ED Medications  Medications   tetanus-diphtheria-acellular pertussis (BOOSTRIX) IM injection 0 5 mL (has no administration in time range)       Diagnostic Studies  Results Reviewed     None                 No orders to display              Procedures  Procedures         ED Course                                           MDM  Number of Diagnoses or Management Options  Diagnosis management comments: Update tetanus  Supportive care at home  Strict return precautions given        Disposition  Final diagnoses:   Abrasion of left foot excluding toe, initial encounter     Time reflects when diagnosis was documented in both MDM as applicable and the Disposition within this note     Time User Action Codes Description Comment    7/2/2021  9:35 AM Marisela Bowers Add [[id number] [id number]] Abrasion of left foot excluding toe, initial encounter       ED Disposition     ED Disposition Condition Date/Time Comment    Discharge Stable Fri Jul 2, 2021  9:35 AM Maureen Divers discharge to home/self care  Follow-up Information     Follow up With Specialties Details Why Contact Info Additional Information    Roni Dee MD Family Medicine Schedule an appointment as soon as possible for a visit   [address]  [address] [place] [address]  [phone]       [address] [facility] Emergency Medicine Go to  If symptoms worsen [facility] [id number] [address] [facility] Emergency Department, [address] [place] [address], [facility]          Patient's Medications   Discharge Prescriptions    No medications on file     No discharge procedures on file      PDMP Review     None          ED Provider  Electronically Signed by           Margart Galeazzi, MD  07/02/21 4463

## 2021-09-08 DIAGNOSIS — I10 BENIGN ESSENTIAL HYPERTENSION: ICD-10-CM

## 2021-09-17 ENCOUNTER — APPOINTMENT (OUTPATIENT)
Dept: LAB | Facility: CLINIC | Age: 66
End: 2021-09-17
Payer: MEDICARE

## 2021-09-17 ENCOUNTER — OFFICE VISIT (OUTPATIENT)
Dept: FAMILY MEDICINE CLINIC | Facility: CLINIC | Age: 66
End: 2021-09-17

## 2021-09-17 VITALS
SYSTOLIC BLOOD PRESSURE: 142 MMHG | WEIGHT: 164.7 LBS | HEART RATE: 68 BPM | DIASTOLIC BLOOD PRESSURE: 70 MMHG | OXYGEN SATURATION: 96 % | HEIGHT: 61 IN | TEMPERATURE: 97.4 F | BODY MASS INDEX: 31.1 KG/M2 | RESPIRATION RATE: 18 BRPM

## 2021-09-17 DIAGNOSIS — I10 BENIGN ESSENTIAL HYPERTENSION: ICD-10-CM

## 2021-09-17 DIAGNOSIS — E66.9 OBESITY (BMI 30.0-34.9): ICD-10-CM

## 2021-09-17 DIAGNOSIS — I70.219 ATHEROSCLEROSIS OF NATIVE ARTERY OF EXTREMITY WITH INTERMITTENT CLAUDICATION, UNSPECIFIED EXTREMITY (HCC): ICD-10-CM

## 2021-09-17 DIAGNOSIS — I10 BENIGN ESSENTIAL HYPERTENSION: Primary | ICD-10-CM

## 2021-09-17 DIAGNOSIS — Z87.891 PERSONAL HISTORY OF NICOTINE DEPENDENCE: ICD-10-CM

## 2021-09-17 DIAGNOSIS — I82.413 ACUTE DEEP VEIN THROMBOSIS (DVT) OF FEMORAL VEIN OF BOTH LOWER EXTREMITIES (HCC): ICD-10-CM

## 2021-09-17 DIAGNOSIS — J41.8 MIXED SIMPLE AND MUCOPURULENT CHRONIC BRONCHITIS (HCC): ICD-10-CM

## 2021-09-17 DIAGNOSIS — R06.02 SOB (SHORTNESS OF BREATH): ICD-10-CM

## 2021-09-17 DIAGNOSIS — E55.9 VITAMIN D DEFICIENCY: ICD-10-CM

## 2021-09-17 DIAGNOSIS — R35.1 NOCTURIA: ICD-10-CM

## 2021-09-17 DIAGNOSIS — I26.99 PULMONARY EMBOLISM WITH INFARCTION (HCC): ICD-10-CM

## 2021-09-17 DIAGNOSIS — F17.200 CURRENT SMOKER: ICD-10-CM

## 2021-09-17 LAB
25(OH)D3 SERPL-MCNC: 20.5 NG/ML (ref 30–100)
ALBUMIN SERPL BCP-MCNC: 3.7 G/DL (ref 3.5–5)
ALP SERPL-CCNC: 71 U/L (ref 46–116)
ALT SERPL W P-5'-P-CCNC: 31 U/L (ref 12–78)
ANION GAP SERPL CALCULATED.3IONS-SCNC: 4 MMOL/L (ref 4–13)
AST SERPL W P-5'-P-CCNC: 29 U/L (ref 5–45)
BASOPHILS # BLD AUTO: 0.07 THOUSANDS/ΜL (ref 0–0.1)
BASOPHILS NFR BLD AUTO: 1 % (ref 0–1)
BILIRUB SERPL-MCNC: 0.69 MG/DL (ref 0.2–1)
BUN SERPL-MCNC: 13 MG/DL (ref 5–25)
CALCIUM SERPL-MCNC: 9.8 MG/DL (ref 8.3–10.1)
CHLORIDE SERPL-SCNC: 102 MMOL/L (ref 100–108)
CHOLEST SERPL-MCNC: 224 MG/DL (ref 50–200)
CO2 SERPL-SCNC: 30 MMOL/L (ref 21–32)
CREAT SERPL-MCNC: 1.02 MG/DL (ref 0.6–1.3)
CREAT UR-MCNC: 112 MG/DL
EOSINOPHIL # BLD AUTO: 0.33 THOUSAND/ΜL (ref 0–0.61)
EOSINOPHIL NFR BLD AUTO: 4 % (ref 0–6)
ERYTHROCYTE [DISTWIDTH] IN BLOOD BY AUTOMATED COUNT: 12.9 % (ref 11.6–15.1)
GFR SERPL CREATININE-BSD FRML MDRD: 76 ML/MIN/1.73SQ M
GLUCOSE P FAST SERPL-MCNC: 78 MG/DL (ref 65–99)
HCT VFR BLD AUTO: 46.8 % (ref 36.5–49.3)
HDLC SERPL-MCNC: 42 MG/DL
HGB BLD-MCNC: 14.8 G/DL (ref 12–17)
IMM GRANULOCYTES # BLD AUTO: 0.04 THOUSAND/UL (ref 0–0.2)
IMM GRANULOCYTES NFR BLD AUTO: 0 % (ref 0–2)
LDLC SERPL CALC-MCNC: 145 MG/DL (ref 0–100)
LYMPHOCYTES # BLD AUTO: 4.02 THOUSANDS/ΜL (ref 0.6–4.47)
LYMPHOCYTES NFR BLD AUTO: 43 % (ref 14–44)
MCH RBC QN AUTO: 29.5 PG (ref 26.8–34.3)
MCHC RBC AUTO-ENTMCNC: 31.6 G/DL (ref 31.4–37.4)
MCV RBC AUTO: 93 FL (ref 82–98)
MICROALBUMIN UR-MCNC: 19.4 MG/L (ref 0–20)
MICROALBUMIN/CREAT 24H UR: 17 MG/G CREATININE (ref 0–30)
MONOCYTES # BLD AUTO: 0.58 THOUSAND/ΜL (ref 0.17–1.22)
MONOCYTES NFR BLD AUTO: 6 % (ref 4–12)
NEUTROPHILS # BLD AUTO: 4.36 THOUSANDS/ΜL (ref 1.85–7.62)
NEUTS SEG NFR BLD AUTO: 46 % (ref 43–75)
NONHDLC SERPL-MCNC: 182 MG/DL
NRBC BLD AUTO-RTO: 0 /100 WBCS
PLATELET # BLD AUTO: 226 THOUSANDS/UL (ref 149–390)
PMV BLD AUTO: 11.6 FL (ref 8.9–12.7)
POTASSIUM SERPL-SCNC: 4.4 MMOL/L (ref 3.5–5.3)
PROT SERPL-MCNC: 8.8 G/DL (ref 6.4–8.2)
RBC # BLD AUTO: 5.01 MILLION/UL (ref 3.88–5.62)
SODIUM SERPL-SCNC: 136 MMOL/L (ref 136–145)
TRIGL SERPL-MCNC: 185 MG/DL
TSH SERPL DL<=0.05 MIU/L-ACNC: 2.17 UIU/ML (ref 0.36–3.74)
WBC # BLD AUTO: 9.4 THOUSAND/UL (ref 4.31–10.16)

## 2021-09-17 PROCEDURE — 36415 COLL VENOUS BLD VENIPUNCTURE: CPT

## 2021-09-17 PROCEDURE — 84443 ASSAY THYROID STIM HORMONE: CPT

## 2021-09-17 PROCEDURE — 80061 LIPID PANEL: CPT

## 2021-09-17 PROCEDURE — 82043 UR ALBUMIN QUANTITATIVE: CPT

## 2021-09-17 PROCEDURE — 3078F DIAST BP <80 MM HG: CPT | Performed by: FAMILY MEDICINE

## 2021-09-17 PROCEDURE — 82306 VITAMIN D 25 HYDROXY: CPT

## 2021-09-17 PROCEDURE — 99214 OFFICE O/P EST MOD 30 MIN: CPT | Performed by: FAMILY MEDICINE

## 2021-09-17 PROCEDURE — 80053 COMPREHEN METABOLIC PANEL: CPT

## 2021-09-17 PROCEDURE — 85025 COMPLETE CBC W/AUTO DIFF WBC: CPT

## 2021-09-17 PROCEDURE — 3077F SYST BP >= 140 MM HG: CPT | Performed by: FAMILY MEDICINE

## 2021-09-17 PROCEDURE — 84154 ASSAY OF PSA FREE: CPT

## 2021-09-17 PROCEDURE — 84153 ASSAY OF PSA TOTAL: CPT

## 2021-09-17 PROCEDURE — 82570 ASSAY OF URINE CREATININE: CPT

## 2021-09-17 RX ORDER — FLUTICASONE FUROATE AND VILANTEROL TRIFENATATE 100; 25 UG/1; UG/1
1 POWDER RESPIRATORY (INHALATION) DAILY
Qty: 60 EACH | Refills: 3 | Status: SHIPPED | OUTPATIENT
Start: 2021-09-17

## 2021-09-17 RX ORDER — ALBUTEROL SULFATE 90 UG/1
2 AEROSOL, METERED RESPIRATORY (INHALATION) EVERY 6 HOURS PRN
Qty: 18 G | Refills: 1 | Status: SHIPPED | OUTPATIENT
Start: 2021-09-17 | End: 2021-11-09

## 2021-09-17 RX ORDER — LISINOPRIL 20 MG/1
20 TABLET ORAL DAILY
Qty: 90 TABLET | Refills: 3 | Status: SHIPPED | OUTPATIENT
Start: 2021-09-17

## 2021-09-17 RX ORDER — PRAVASTATIN SODIUM 40 MG
40 TABLET ORAL DAILY
Qty: 90 TABLET | Refills: 3 | Status: SHIPPED | OUTPATIENT
Start: 2021-09-17

## 2021-09-17 NOTE — PROGRESS NOTES
Assessment/Plan:    No problem-specific Assessment & Plan notes found for this encounter  Diagnoses and all orders for this visit:    Benign essential hypertension  -     pravastatin (PRAVACHOL) 40 mg tablet; Take 1 tablet (40 mg total) by mouth daily  -     metoprolol tartrate (LOPRESSOR) 25 mg tablet; Take 1 tablet (25 mg total) by mouth every 12 (twelve) hours  -     lisinopril (ZESTRIL) 20 mg tablet; Take 1 tablet (20 mg total) by mouth daily  -     CBC and differential; Future  -     Comprehensive metabolic panel; Future  -     Lipid panel; Future  -     Vitamin D 25 hydroxy; Future  -     Microalbumin / creatinine urine ratio; Future  -     TSH, 3rd generation with Free T4 reflex; Future    Obesity (BMI 30 0-34 9)  -     CBC and differential; Future  -     Comprehensive metabolic panel; Future  -     Lipid panel; Future  -     Vitamin D 25 hydroxy; Future  -     Microalbumin / creatinine urine ratio; Future  -     TSH, 3rd generation with Free T4 reflex; Future    Vitamin D deficiency  -     Vitamin D 25 hydroxy; Future    Atherosclerosis of native artery of extremity with intermittent claudication, unspecified extremity ([facility] )  -     Lipid panel; Future    Pulmonary embolism with infarction (HCC)  -     rivaroxaban (Xarelto) 20 mg tablet; Take 1 tablet (20 mg total) by mouth daily    Acute deep vein thrombosis (DVT) of femoral vein of both lower extremities (HCC)  -     rivaroxaban (Xarelto) 20 mg tablet; Take 1 tablet (20 mg total) by mouth daily    Mixed simple and mucopurulent chronic bronchitis (HCC)  -     fluticasone-vilanterol (Breo Ellipta) 100-25 mcg/inh inhaler; Inhale 1 puff daily Rinse mouth after use    SOB (shortness of breath)  -     albuterol (Ventolin HFA) 90 mcg/act inhaler; Inhale 2 puffs every 6 (six) hours as needed for wheezing    Nocturia  -     PSA, total and free;  Future        Reviewed importance of using Breo daily as prescribed   Subjective:      Patient ID: Chiquita Kennedy is a 76 y o  male  75 yo  male with multiple medical conditions including but not limited to CAD, HTN, s/p PE, PVD, COPD here today for follow up  As per patient he has been having increased wheezing and cough since it has been raining  Denies fever, chills, CP, SOB, [place]  Patient only uses Breo inhaler as needed and not everyday as prescribed  Stopped smoking for about 3 days but states it gives him pleasure to smoke so he restarted smoking, states he mainly smokes weed, occasionally tobacco, often cigarettes  Complains of nocturia, gets up 2 to 4 times to urinate     Requesting Flu vaccine         The following portions of the patient's history were reviewed and updated as appropriate: He  has a past medical history of DVT (deep venous thrombosis) (HCC) and Hypercholesterolemia    He   Patient Active Problem List    Diagnosis Date Noted    Obesity (BMI 30 0-34 9) 04/29/2020    Current smoker 04/29/2020    Nasal congestion 11/04/2019    Hypercholesterolemia 10/17/2018    Mixed simple and mucopurulent chronic bronchitis ([address] ) 10/17/2018    Rib pain on right side 03/19/2018    Abdominal adhesions 12/11/2017    Benign essential hypertension 01/18/2017    Aphagia 08/02/2016    Choking sensation 08/02/2016    Vitamin D deficiency 04/04/2016    Heel spur 03/17/2016    Arch pain, left 11/16/2015    Intermittent claudication (HCC) 11/16/2015    Limb swelling 11/16/2015    Tinea pedis of both feet 11/16/2015    Plantar fasciitis 11/04/2015    Testicular discomfort 11/04/2015    Low back pain 06/04/2015    Erectile dysfunction of non-organic origin 07/17/2014    Atherosclerosis of native artery of extremity with intermittent claudication ([address] ) 05/21/2014    Peripheral vascular disease ([address] ) 05/21/2014    Acute deep vein thrombosis (DVT) of both lower extremities ([address] ) 03/24/2014    Pulmonary embolism with infarction ([address] ) 03/21/2013     He  has a past surgical history that includes Vascular surgery; pr colonoscopy flx dx w/collj spec when pfrmd (N/A, 5/17/2016); Anterior compartment decompression; and Angioplasty  His family history includes Diabetes type II in his father and mother; Heart attack in his father and mother; Heart disease in his father; Lung cancer in his father and mother; Other in his mother  He  reports that he has quit smoking  He has never used smokeless tobacco  He reports current drug use  Drug: Marijuana  He reports that he does not drink alcohol  Current Outpatient Medications   Medication Sig Dispense Refill    albuterol (Ventolin HFA) 90 mcg/act inhaler Inhale 2 puffs every 6 (six) hours as needed for wheezing 18 g 1    Aspirin (ASPIR-81 PO) Take by mouth   fluticasone (FLONASE) 50 mcg/act nasal spray SPRAY 1 SPRAY INTO EACH NOSTRIL DAILY 3 Bottle 1    fluticasone-vilanterol (Breo Ellipta) 100-25 mcg/inh inhaler Inhale 1 puff daily Rinse mouth after use 60 each 3    lisinopril (ZESTRIL) 20 mg tablet Take 1 tablet (20 mg total) by mouth daily 90 tablet 3    loratadine (CLARITIN) 10 mg tablet Take 1 tablet (10 mg total) by mouth daily 90 tablet 1    meloxicam (MOBIC) 7 5 mg tablet Take 1 tablet by mouth 2 (two) times a day as needed      metoprolol tartrate (LOPRESSOR) 25 mg tablet Take 1 tablet (25 mg total) by mouth every 12 (twelve) hours 90 tablet 2    pravastatin (PRAVACHOL) 40 mg tablet Take 1 tablet (40 mg total) by mouth daily 90 tablet 3    rivaroxaban (Xarelto) 20 mg tablet Take 1 tablet (20 mg total) by mouth daily 90 tablet 4    Cholecalciferol (D3 DOTS) 2000 UNITS TBDP Take by mouth   cilostazol (PLETAL) 100 mg tablet Take 100 mg by mouth 2 (two) times a day        lisinopril (ZESTRIL) 20 mg tablet Take 1 tablet by mouth daily      meloxicam (MOBIC) 7 5 mg tablet TAKE ONE TABLET BY MOUTH ONCE DAILY 90 tablet 0    traMADol (ULTRAM) 50 mg tablet Take 1 tablet (50 mg total) by mouth 2 (two) times a day 30 tablet 0     No current facility-administered medications for this visit  Current Outpatient Medications on File Prior to Visit   Medication Sig    Aspirin (ASPIR-81 PO) Take by mouth   fluticasone (FLONASE) 50 mcg/act nasal spray SPRAY 1 SPRAY INTO EACH NOSTRIL DAILY    loratadine (CLARITIN) 10 mg tablet Take 1 tablet (10 mg total) by mouth daily    meloxicam (MOBIC) 7 5 mg tablet Take 1 tablet by mouth 2 (two) times a day as needed    [DISCONTINUED] albuterol (Ventolin HFA) 90 mcg/act inhaler Inhale 2 puffs every 6 (six) hours as needed for wheezing    [DISCONTINUED] fluticasone-vilanterol (Breo Ellipta) 100-25 mcg/inh inhaler Inhale 1 puff daily Rinse mouth after use    [DISCONTINUED] lisinopril (ZESTRIL) 20 mg tablet Take 1 tablet (20 mg total) by mouth daily    [DISCONTINUED] metoprolol tartrate (LOPRESSOR) 25 mg tablet TAKE ONE TABLET BY MOUTH EVERY 12 HOURS    [DISCONTINUED] pravastatin (PRAVACHOL) 40 mg tablet Take 1 tablet (40 mg total) by mouth daily    [DISCONTINUED] rivaroxaban (Xarelto) 20 mg tablet Take 1 tablet (20 mg total) by mouth daily    Cholecalciferol (D3 DOTS) 2000 UNITS TBDP Take by mouth   cilostazol (PLETAL) 100 mg tablet Take 100 mg by mouth 2 (two) times a day   lisinopril (ZESTRIL) 20 mg tablet Take 1 tablet by mouth daily    meloxicam (MOBIC) 7 5 mg tablet TAKE ONE TABLET BY MOUTH ONCE DAILY    traMADol (ULTRAM) 50 mg tablet Take 1 tablet (50 mg total) by mouth 2 (two) times a day     No current facility-administered medications on file prior to visit       Review of Systems   Musculoskeletal: Positive for arthralgias and back pain  All other systems reviewed and are negative          Objective:      /70 (BP Location: Right arm, Patient Position: Sitting, Cuff Size: Standard)   Pulse 68   Temp (!) 97 4 °F (36 3 °C) (Temporal)   Resp 18   Ht 5' 1" (1 549 m)   Wt 74 7 kg (164 lb 11 2 oz)   SpO2 96%   BMI 31 12 kg/m²          Physical Exam  Vitals and nursing note reviewed  Constitutional:       Appearance: He is well-developed  HENT:      Head: Normocephalic  Right Ear: External ear normal       Left Ear: External ear normal       Nose: Nose normal    Eyes:      Conjunctiva/sclera: Conjunctivae normal       Pupils: Pupils are equal, round, and reactive to light  Neck:      Thyroid: No thyromegaly  Cardiovascular:      Rate and Rhythm: Normal rate and regular rhythm  Heart sounds: Normal heart sounds  Pulmonary:      Effort: Pulmonary effort is normal       Breath sounds: Normal breath sounds  Abdominal:      Palpations: Abdomen is soft  Tenderness: There is no abdominal tenderness  There is no guarding or rebound  Musculoskeletal:         General: Normal range of motion  Cervical back: Normal range of motion and neck supple  Skin:     General: Skin is dry  Neurological:      Mental Status: He is alert and oriented to person, place, and time  Deep Tendon Reflexes: Reflexes are normal and symmetric

## 2021-09-19 LAB
PSA FREE MFR SERPL: 33.3 %
PSA FREE SERPL-MCNC: 0.1 NG/ML
PSA SERPL-MCNC: 0.3 NG/ML (ref 0–4)

## 2021-09-20 ENCOUNTER — IMMUNIZATIONS (OUTPATIENT)
Dept: FAMILY MEDICINE CLINIC | Facility: CLINIC | Age: 66
End: 2021-09-20

## 2021-09-20 DIAGNOSIS — Z23 ENCOUNTER FOR IMMUNIZATION: Primary | ICD-10-CM

## 2021-09-20 PROCEDURE — 90662 IIV NO PRSV INCREASED AG IM: CPT | Performed by: FAMILY MEDICINE

## 2021-09-20 PROCEDURE — G0008 ADMIN INFLUENZA VIRUS VAC: HCPCS | Performed by: FAMILY MEDICINE

## 2021-09-22 DIAGNOSIS — E55.9 VITAMIN D DEFICIENCY: Primary | ICD-10-CM

## 2021-09-22 RX ORDER — CHOLECALCIFEROL (VITAMIN D3) 50 MCG
2000 TABLET ORAL DAILY
Qty: 90 TABLET | Refills: 1 | Status: SHIPPED | OUTPATIENT
Start: 2021-09-22

## 2021-10-08 ENCOUNTER — HOSPITAL ENCOUNTER (OUTPATIENT)
Dept: CT IMAGING | Facility: HOSPITAL | Age: 66
Discharge: HOME | End: 2021-10-08
Payer: MEDICARE

## 2021-10-08 DIAGNOSIS — F17.200 CURRENT SMOKER: ICD-10-CM

## 2021-10-08 DIAGNOSIS — Z87.891 PERSONAL HISTORY OF NICOTINE DEPENDENCE: ICD-10-CM

## 2021-10-08 PROCEDURE — 71271 CT THORAX LUNG CANCER SCR C-: CPT

## 2021-11-09 DIAGNOSIS — R06.02 SOB (SHORTNESS OF BREATH): ICD-10-CM

## 2021-11-30 DIAGNOSIS — R06.02 SOB (SHORTNESS OF BREATH): ICD-10-CM

## 2022-01-03 DIAGNOSIS — R09.81 NASAL CONGESTION: ICD-10-CM

## 2022-01-04 RX ORDER — FLUTICASONE PROPIONATE 50 MCG
SPRAY, SUSPENSION (ML) NASAL
Qty: 16 G | Refills: 4 | Status: SHIPPED | OUTPATIENT
Start: 2022-01-04

## 2022-01-10 DIAGNOSIS — R06.02 SOB (SHORTNESS OF BREATH): ICD-10-CM

## 2022-01-10 DIAGNOSIS — Z11.59 SCREENING FOR VIRAL DISEASE: Primary | ICD-10-CM

## 2022-01-10 PROCEDURE — U0005 INFEC AGEN DETEC AMPLI PROBE: HCPCS | Performed by: FAMILY MEDICINE

## 2022-01-10 PROCEDURE — U0003 INFECTIOUS AGENT DETECTION BY NUCLEIC ACID (DNA OR RNA); SEVERE ACUTE RESPIRATORY SYNDROME CORONAVIRUS 2 (SARS-COV-2) (CORONAVIRUS DISEASE [COVID-19]), AMPLIFIED PROBE TECHNIQUE, MAKING USE OF HIGH THROUGHPUT TECHNOLOGIES AS DESCRIBED BY CMS-2020-01-R: HCPCS | Performed by: FAMILY MEDICINE

## 2022-01-17 ENCOUNTER — TELEMEDICINE (OUTPATIENT)
Dept: FAMILY MEDICINE CLINIC | Facility: CLINIC | Age: 67
End: 2022-01-17

## 2022-01-17 DIAGNOSIS — U07.1 COVID: Primary | ICD-10-CM

## 2022-01-17 PROCEDURE — 3078F DIAST BP <80 MM HG: CPT | Performed by: FAMILY MEDICINE

## 2022-01-17 PROCEDURE — G2025 DIS SITE TELE SVCS RHC/FQHC: HCPCS | Performed by: FAMILY MEDICINE

## 2022-01-17 PROCEDURE — 3077F SYST BP >= 140 MM HG: CPT | Performed by: FAMILY MEDICINE

## 2022-01-17 NOTE — PROGRESS NOTES
COVID-19 Outpatient Progress Note    Assessment/Plan:    Problem List Items Addressed This Visit        Other    COVID - Primary         Disposition:     Patient has COVID-19 infection  Based off CDC guidelines, they were recommended to isolate for 5 days from the date of the positive test  If they remain asymptomatic, isolation may be ended followed by 5 days of wearing a mask when around othes to minimize risk of infecting others  If they have a fever, continue to stay home until fever resolves for at least 24 hours  Discussed vitamin D, vitamin C, and/or zinc supplementation with patient  I have spent 9 minutes directly with the patient  Patient reports symptoms started on 1/7/2022, tested on 1/10/2022   More than 7 days since his symptoms started at this time  Patient states currently doing well  No further episodes of fever, currently asymptomatic      Encounter provider Alex Vaz MD    Provider located at [address] [address] [phone]    Recent Visits  No visits were found meeting these conditions  Showing recent visits within past 7 days and meeting all other requirements  Future Appointments  No visits were found meeting these conditions  Showing future appointments within next 150 days and meeting all other requirements     This virtual check-in was done via telephone and he agrees to proceed  Patient agrees to participate in a virtual check in via telephone or video visit instead of presenting to the office to address urgent/immediate medical needs  Patient is aware this is a billable service  After connecting through Telephone, the patient was identified by name and date of birth  Ely Leon was informed that this was a telemedicine visit and that the exam was being conducted confidentially over secure lines  My office door was closed  No one else was in the room   Ely Leon acknowledged consent and understanding of privacy and security of the telemedicine visit  I informed the patient that I have reviewed his record in Epic and presented the opportunity for him to ask any questions regarding the visit today  The patient agreed to participate  It was my intent to perform this visit via video technology but the patient was not able to do a video connection so the visit was completed via audio telephone only  Verification of patient location:  Patient is located in the following state in which I hold an active license: [place]    Subjective:   Soha Ayala is a 78 y o  male who has been screened for COVID-19  Symptom change since last report: resolving  Patient is currently asymptomatic  Patient denies fever, chills, fatigue, malaise, congestion, rhinorrhea, sore throat, anosmia, loss of taste, cough, shortness of breath, chest tightness, abdominal pain, nausea, vomiting, diarrhea, myalgias and headaches  - Date of symptom onset: 1/6/2022  - Date of positive COVID-19 test: 1/10/2022  COVID-19 vaccination status: Fully vaccinated with booster    Tinocristonatasha Mckay has been staying home and has isolated themselves in his home  He is taking care to not share personal items and is cleaning all surfaces that are touched often, like counters, tabletops, and doorknobs using household cleaning sprays or wipes  He is wearing a mask when he leaves his room  Lab Results   Component Value Date    SARSCOV2 Positive (A) 01/10/2022     Past Medical History:   Diagnosis Date    DVT [facility]deep venous thrombosis) ([facility] )     Hypercholesterolemia      Past Surgical History:   Procedure Laterality Date    ANGIOPLASTY      orly lwr extr arter byp w/o throm w vein pacth angioplasty    ANTERIOR COMPARTMENT DECOMPRESSION      leg    DC COLONOSCOPY FLX DX W/COLLJ SPEC WHEN PFRMD N/A 5/17/2016    Procedure: COLONOSCOPY;  Surgeon: Chris Sorensen MD;  Location: [facility];   Service: Gastroenterology   Charis Johnson VASCULAR SURGERY       Current Outpatient Medications   Medication Sig Dispense Refill    Aspirin (ASPIR-81 PO) Take by mouth   Cholecalciferol (D3 DOTS) 2000 UNITS TBDP Take by mouth   Cholecalciferol (Vitamin D) 50 MCG (2000 UT) tablet Take 1 tablet (2,000 Units total) by mouth daily 90 tablet 1    cilostazol (PLETAL) 100 mg tablet Take 100 mg by mouth 2 (two) times a day   fluticasone (FLONASE) 50 mcg/act nasal spray TAKE ONE SPRAY IN TO EACH NOSTRIL ONCE DAILY 16 g 4    fluticasone-vilanterol (Breo Ellipta) 100-25 mcg/inh inhaler Inhale 1 puff daily Rinse mouth after use 60 each 3    lisinopril (ZESTRIL) 20 mg tablet Take 1 tablet by mouth daily      lisinopril (ZESTRIL) 20 mg tablet Take 1 tablet (20 mg total) by mouth daily 90 tablet 3    loratadine (CLARITIN) 10 mg tablet Take 1 tablet (10 mg total) by mouth daily 90 tablet 1    meloxicam (MOBIC) 7 5 mg tablet Take 1 tablet by mouth 2 (two) times a day as needed      meloxicam (MOBIC) 7 5 mg tablet TAKE ONE TABLET BY MOUTH ONCE DAILY 90 tablet 0    metoprolol tartrate (LOPRESSOR) 25 mg tablet Take 1 tablet (25 mg total) by mouth every 12 (twelve) hours 90 tablet 2    pravastatin (PRAVACHOL) 40 mg tablet Take 1 tablet (40 mg total) by mouth daily 90 tablet 3    rivaroxaban (Xarelto) 20 mg tablet Take 1 tablet (20 mg total) by mouth daily 90 tablet 4    traMADol (ULTRAM) 50 mg tablet Take 1 tablet (50 mg total) by mouth 2 (two) times a day 30 tablet 0    Ventolin  (90 Base) MCG/ACT inhaler INHALE 2 PUFFS BY MOUTH EVERY 6 HOURS AS NEEDED FOR WHEEZING 18 g 0     No current facility-administered medications for this visit  Allergies   Allergen Reactions    Penicillins Rash       Review of Systems   Constitutional: Negative for chills, fatigue and fever  HENT: Negative for congestion, rhinorrhea and sore throat  Respiratory: Negative for cough, chest tightness and shortness of breath      Gastrointestinal: Negative for abdominal pain, diarrhea, nausea and vomiting  Musculoskeletal: Negative for myalgias  Neurological: Negative for headaches  Objective: There were no vitals filed for this visit  Physical Exam  Pulmonary:      Effort: Pulmonary effort is normal    Neurological:      Mental Status: He is alert and oriented to person, place, and time  VIRTUAL VISIT DISCLAIMER    Nora April verbally agrees to participate in [employer]  Pt is aware that [employer] could be limited without vital signs or the ability to perform a full hands-on physical Mery Ferreira understands he or the provider may request at any time to terminate the video visit and request the patient to seek care or treatment in person

## 2022-01-31 DIAGNOSIS — R06.02 SOB (SHORTNESS OF BREATH): ICD-10-CM

## 2022-02-08 DIAGNOSIS — I10 BENIGN ESSENTIAL HYPERTENSION: ICD-10-CM

## 2022-02-22 ENCOUNTER — APPOINTMENT (OUTPATIENT)
Dept: LAB | Facility: CLINIC | Age: 67
End: 2022-02-22
Payer: MEDICARE

## 2022-02-22 ENCOUNTER — OFFICE VISIT (OUTPATIENT)
Dept: FAMILY MEDICINE CLINIC | Facility: CLINIC | Age: 67
End: 2022-02-22

## 2022-02-22 VITALS
BODY MASS INDEX: 31.59 KG/M2 | WEIGHT: 167.3 LBS | HEART RATE: 74 BPM | RESPIRATION RATE: 16 BRPM | TEMPERATURE: 97.8 F | SYSTOLIC BLOOD PRESSURE: 118 MMHG | OXYGEN SATURATION: 98 % | HEIGHT: 61 IN | DIASTOLIC BLOOD PRESSURE: 56 MMHG

## 2022-02-22 DIAGNOSIS — I26.99 PULMONARY EMBOLISM WITH INFARCTION (HCC): Primary | ICD-10-CM

## 2022-02-22 DIAGNOSIS — I10 BENIGN ESSENTIAL HYPERTENSION: ICD-10-CM

## 2022-02-22 DIAGNOSIS — H54.7 DECREASED VISION: ICD-10-CM

## 2022-02-22 DIAGNOSIS — E66.9 OBESITY (BMI 30.0-34.9): ICD-10-CM

## 2022-02-22 DIAGNOSIS — Z00.00 MEDICARE ANNUAL WELLNESS VISIT, INITIAL: ICD-10-CM

## 2022-02-22 LAB
ALBUMIN SERPL BCP-MCNC: 3.6 G/DL (ref 3.5–5)
ALP SERPL-CCNC: 62 U/L (ref 46–116)
ALT SERPL W P-5'-P-CCNC: 26 U/L (ref 12–78)
ANION GAP SERPL CALCULATED.3IONS-SCNC: 1 MMOL/L (ref 4–13)
AST SERPL W P-5'-P-CCNC: 22 U/L (ref 5–45)
BASOPHILS # BLD AUTO: 0.08 THOUSANDS/ΜL (ref 0–0.1)
BASOPHILS NFR BLD AUTO: 1 % (ref 0–1)
BILIRUB SERPL-MCNC: 0.56 MG/DL (ref 0.2–1)
BUN SERPL-MCNC: 17 MG/DL (ref 5–25)
CALCIUM SERPL-MCNC: 9.7 MG/DL (ref 8.3–10.1)
CHLORIDE SERPL-SCNC: 107 MMOL/L (ref 100–108)
CHOLEST SERPL-MCNC: 186 MG/DL
CO2 SERPL-SCNC: 30 MMOL/L (ref 21–32)
CREAT SERPL-MCNC: 1.1 MG/DL (ref 0.6–1.3)
CREAT UR-MCNC: 187 MG/DL
EOSINOPHIL # BLD AUTO: 0.39 THOUSAND/ΜL (ref 0–0.61)
EOSINOPHIL NFR BLD AUTO: 4 % (ref 0–6)
ERYTHROCYTE [DISTWIDTH] IN BLOOD BY AUTOMATED COUNT: 13.6 % (ref 11.6–15.1)
GFR SERPL CREATININE-BSD FRML MDRD: 69 ML/MIN/1.73SQ M
GLUCOSE P FAST SERPL-MCNC: 98 MG/DL (ref 65–99)
HCT VFR BLD AUTO: 41.9 % (ref 36.5–49.3)
HDLC SERPL-MCNC: 38 MG/DL
HGB BLD-MCNC: 13 G/DL (ref 12–17)
IMM GRANULOCYTES # BLD AUTO: 0.04 THOUSAND/UL (ref 0–0.2)
IMM GRANULOCYTES NFR BLD AUTO: 0 % (ref 0–2)
LDLC SERPL CALC-MCNC: 124 MG/DL (ref 0–100)
LYMPHOCYTES # BLD AUTO: 4.41 THOUSANDS/ΜL (ref 0.6–4.47)
LYMPHOCYTES NFR BLD AUTO: 49 % (ref 14–44)
MCH RBC QN AUTO: 29.3 PG (ref 26.8–34.3)
MCHC RBC AUTO-ENTMCNC: 31 G/DL (ref 31.4–37.4)
MCV RBC AUTO: 95 FL (ref 82–98)
MICROALBUMIN UR-MCNC: 24 MG/L (ref 0–20)
MICROALBUMIN/CREAT 24H UR: 13 MG/G CREATININE (ref 0–30)
MONOCYTES # BLD AUTO: 0.74 THOUSAND/ΜL (ref 0.17–1.22)
MONOCYTES NFR BLD AUTO: 8 % (ref 4–12)
NEUTROPHILS # BLD AUTO: 3.48 THOUSANDS/ΜL (ref 1.85–7.62)
NEUTS SEG NFR BLD AUTO: 38 % (ref 43–75)
NONHDLC SERPL-MCNC: 148 MG/DL
NRBC BLD AUTO-RTO: 0 /100 WBCS
PLATELET # BLD AUTO: 209 THOUSANDS/UL (ref 149–390)
PMV BLD AUTO: 11.6 FL (ref 8.9–12.7)
POTASSIUM SERPL-SCNC: 5 MMOL/L (ref 3.5–5.3)
PROT SERPL-MCNC: 8.4 G/DL (ref 6.4–8.2)
RBC # BLD AUTO: 4.43 MILLION/UL (ref 3.88–5.62)
SODIUM SERPL-SCNC: 138 MMOL/L (ref 136–145)
TRIGL SERPL-MCNC: 118 MG/DL
TSH SERPL DL<=0.05 MIU/L-ACNC: 0.89 UIU/ML (ref 0.36–3.74)
WBC # BLD AUTO: 9.14 THOUSAND/UL (ref 4.31–10.16)

## 2022-02-22 PROCEDURE — 3074F SYST BP LT 130 MM HG: CPT | Performed by: FAMILY MEDICINE

## 2022-02-22 PROCEDURE — 84443 ASSAY THYROID STIM HORMONE: CPT | Performed by: FAMILY MEDICINE

## 2022-02-22 PROCEDURE — 80061 LIPID PANEL: CPT | Performed by: FAMILY MEDICINE

## 2022-02-22 PROCEDURE — 82043 UR ALBUMIN QUANTITATIVE: CPT | Performed by: FAMILY MEDICINE

## 2022-02-22 PROCEDURE — 82570 ASSAY OF URINE CREATININE: CPT | Performed by: FAMILY MEDICINE

## 2022-02-22 PROCEDURE — 85025 COMPLETE CBC W/AUTO DIFF WBC: CPT | Performed by: FAMILY MEDICINE

## 2022-02-22 PROCEDURE — 36415 COLL VENOUS BLD VENIPUNCTURE: CPT | Performed by: FAMILY MEDICINE

## 2022-02-22 PROCEDURE — 80053 COMPREHEN METABOLIC PANEL: CPT | Performed by: FAMILY MEDICINE

## 2022-02-22 PROCEDURE — 3078F DIAST BP <80 MM HG: CPT | Performed by: FAMILY MEDICINE

## 2022-02-22 PROCEDURE — 1123F ACP DISCUSS/DSCN MKR DOCD: CPT | Performed by: FAMILY MEDICINE

## 2022-02-22 PROCEDURE — G0439 PPPS, SUBSEQ VISIT: HCPCS | Performed by: FAMILY MEDICINE

## 2022-02-22 NOTE — PATIENT INSTRUCTIONS

## 2022-02-22 NOTE — PROGRESS NOTES
Assessment and Plan:     Problem List Items Addressed This Visit        Respiratory    Pulmonary embolism with infarction [facility]) - Primary       Cardiovascular and Mediastinum    Benign essential hypertension    Relevant Orders    CBC and differential    Comprehensive metabolic panel    Lipid panel    TSH, 3rd generation with Free T4 reflex    Microalbumin / creatinine urine ratio       Other    Obesity (BMI 30 0-34  9)    Relevant Orders    CBC and differential    Comprehensive metabolic panel    Lipid panel    TSH, 3rd generation with Free T4 reflex    Microalbumin / creatinine urine ratio      Other Visit Diagnoses     Medicare annual wellness visit, initial        BMI 31 0-31 9,adult        Decreased vision        Relevant Orders    Ambulatory Referral to Optometry           Preventive health issues were discussed with patient, and age appropriate screening tests were ordered as noted in patient's After Visit Summary  Personalized health advice and appropriate referrals for health education or preventive services given if needed, as noted in patient's After Visit Summary  History of Present Illness:     Patient presents for Welcome to Medicare visit       Patient Care Team:  Grace Meneses MD as PCP - MD Lei Torres MD Prentiss Laroche, MD as Endoscopist     Review of Systems:     Review of Systems   Problem List:     Patient Active Problem List   Diagnosis    Abdominal adhesions    Acute deep vein thrombosis (DVT) of both lower extremities ([address] )    Aphagia    Arch pain, left    Benign essential hypertension    Atherosclerosis of native artery of extremity with intermittent claudication ([address] )    Choking sensation    Erectile dysfunction of non-organic origin    Heel spur    Intermittent claudication (HCC)    Limb swelling    Low back pain    Pulmonary embolism with infarction ([address] )    Peripheral vascular disease ([address] )    Plantar fasciitis    Testicular discomfort    Tinea pedis of both feet    Vitamin D deficiency    Rib pain on right side    Hypercholesterolemia    Mixed simple and mucopurulent chronic bronchitis (HCC)    Nasal congestion    Obesity (BMI 30 0-34  9)    Current smoker    COVID      Past Medical and Surgical History:     Past Medical History:   Diagnosis Date    DVT (deep venous thrombosis) ([address] )     Hypercholesterolemia      Past Surgical History:   Procedure Laterality Date    ANGIOPLASTY      orly lwr extr arter byp w/o throm w vein pacth angioplasty    ANTERIOR COMPARTMENT DECOMPRESSION      leg    TN COLONOSCOPY FLX DX W/COLLJ SPEC WHEN PFRMD N/A 5/17/2016    Procedure: COLONOSCOPY;  Surgeon: Celeste Delarosa MD;  [facility];   Service: Gastroenterology    VASCULAR SURGERY        Family History:     Family History   Problem Relation Age of Onset    Other Mother         brain tumor    Lung cancer Mother     Heart attack Mother     Diabetes type II Mother     Heart disease Father     Lung cancer Father     Heart attack Father     Diabetes type II Father       Social History:     Social History     Socioeconomic History    Marital status:      Spouse name: None    Number of children: None    Years of education: None    Highest education level: None   Occupational History    None   Tobacco Use    Smoking status: Former Smoker    Smokeless tobacco: Never Used   Vaping Use    Vaping Use: Never used   Substance and Sexual Activity    Alcohol use: No    Drug use: Yes     Types: Marijuana    Sexual activity: None   Other Topics Concern    None   Social History Narrative    No advance directives    [facility]     Social Determinants of Health     Financial Resource Strain: Not on file   Food Insecurity: Not on file   Transportation Needs: Not on file   Physical Activity: Not on file   Stress: Not on file   Social Connections: Not on file   Intimate Partner Violence: Not on file   Housing Stability: Not on file Medications and Allergies:     Current Outpatient Medications   Medication Sig Dispense Refill    Aspirin (ASPIR-81 PO) Take by mouth   Cholecalciferol (D3 DOTS) 2000 UNITS TBDP Take by mouth   cilostazol (PLETAL) 100 mg tablet Take 100 mg by mouth 2 (two) times a day   fluticasone (FLONASE) 50 mcg/act nasal spray TAKE ONE SPRAY IN TO EACH NOSTRIL ONCE DAILY 16 g 4    fluticasone-vilanterol (Breo Ellipta) 100-25 mcg/inh inhaler Inhale 1 puff daily Rinse mouth after use 60 each 3    lisinopril (ZESTRIL) 20 mg tablet Take 1 tablet (20 mg total) by mouth daily 90 tablet 3    meloxicam (MOBIC) 7 5 mg tablet Take 1 tablet by mouth 2 (two) times a day as needed      metoprolol tartrate (LOPRESSOR) 25 mg tablet TAKE ONE TABLET BY MOUTH EVERY 12 HOURS 90 tablet 1    pravastatin (PRAVACHOL) 40 mg tablet Take 1 tablet (40 mg total) by mouth daily 90 tablet 3    rivaroxaban (Xarelto) 20 mg tablet Take 1 tablet (20 mg total) by mouth daily 90 tablet 4    Ventolin  (90 Base) MCG/ACT inhaler INHALE 2 PUFFS BY MOUTH EVERY SIX HOURS AS NEEDED FOR WHEEZING 18 g 0    Cholecalciferol (Vitamin D) 50 MCG (2000 UT) tablet Take 1 tablet (2,000 Units total) by mouth daily 90 tablet 1    lisinopril (ZESTRIL) 20 mg tablet Take 1 tablet by mouth daily      loratadine (CLARITIN) 10 mg tablet Take 1 tablet (10 mg total) by mouth daily 90 tablet 1    meloxicam (MOBIC) 7 5 mg tablet TAKE ONE TABLET BY MOUTH ONCE DAILY 90 tablet 0    traMADol (ULTRAM) 50 mg tablet Take 1 tablet (50 mg total) by mouth 2 (two) times a day 30 tablet 0     No current facility-administered medications for this visit       Allergies   Allergen Reactions    Penicillins Rash      Immunizations:     Immunization History   Administered Date(s) Administered    INFLUENZA 01/18/2017, 12/11/2017    Influenza Quadrivalent, 6-35 Months IM 01/18/2017    Influenza, high dose seasonal 0 7 mL 09/20/2021    Influenza, recombinant, quadrivalent,injectable, preservative free 10/17/2018, 11/04/2019    Influenza, seasonal, injectable 11/04/2015, 12/11/2017    Pneumococcal Conjugate 13-Valent 11/04/2015    Tdap 03/17/2016, 07/02/2021      Health Maintenance:         Topic Date Due    Colorectal Cancer Screening  05/17/2026    Hepatitis C Screening  Completed         Topic Date Due    COVID-19 Vaccine (1) Never done      Medicare Screening Tests and Risk Assessments:     Larry Best is here for his Subsequent Wellness visit  Health Risk Assessment:   Patient rates overall health as good  Patient feels that their physical health rating is same  Patient is satisfied with their life  Eyesight was rated as slightly worse  Hearing was rated as same  Patient feels that their emotional and mental health rating is same  Patients states they are sometimes angry  Patient states they are sometimes unusually tired/fatigued  Pain experienced in the last 7 days has been some  Patient's pain rating has been 6/10  Patient states that he has experienced weight loss or gain in last 6 months  Fall Risk Screening: In the past year, patient has experienced: no history of falling in past year      Home Safety:  Patient does not have trouble with stairs inside or outside of their home  Patient has working smoke alarms and has no working carbon monoxide detector  Home safety hazards include: none  Nutrition:   Current diet is Low Saturated Fat  Medications:   Patient is currently taking over-the-counter supplements  OTC medications include: see medication list  Patient is able to manage medications       Advance Care Planning:   Living will: No    Durable POA for healthcare: No    Advanced directive: No    Advanced directive counseling given: No    Five wishes given: Yes    Patient declined ACP directive: Yes    End of Life Decisions reviewed with patient: No    Provider agrees with end of life decisions: Yes      PREVENTIVE SCREENINGS Cardiovascular Screening:    General: Screening Not Indicated and History Lipid Disorder      Diabetes Screening:     General: Screening Current      Colorectal Cancer Screening:     General: Screening Current      Prostate Cancer Screening:    General: Screening Current      Abdominal Aortic Aneurysm (AAA) Screening:    Risk factors include: age between 73-67 yo and tobacco use        Lung Cancer Screening:     General: Screening Not Indicated      Hepatitis C Screening:    General: Screening Current    Screening, Brief Intervention, and Referral to Treatment (SBIRT)    Screening  Typical number of drinks in a day: 0    Single Item Drug Screening:  How often have you used an illegal drug (including marijuana) or a prescription medication for non-medical reasons in the past year? daily or almost daily    Single Item Drug Screen Score: 4  Interpretation: POSITIVE screen for possible drug use disorder    Drug Abuse Screening Test (DAST-10):  1) Have you used drugs other than those required for medical reasons? Yes  2) Do you abuse more than one drug at a time? No  3) Are you always able to stop using drugs when you want to? No  4) Have you had "blackouts" or "flashbacks" as a result of drug use? No  5) Do you ever feel bad or guilty about your drug use? No  6) Does your spouse (or parents) ever complain about your involvement with drugs? No  7) Have you neglected your family because of your use of drugs? No  8) Have you engaged in illegal activities in order to obtain drugs? No  9) Have you ever experienced withdrawal symptoms (felt sick) when you stopped taking drugs? No  10) Have you had medical problems as a result of your drug use (e g , memory loss, hepatitis, convulsions, bleeding, etc )? No    DAST-10 Score: 2  Interpretation: Low level problems related to drug abuse    Brief Intervention  Health risks of current substance use discussed       No exam data present     Physical Exam:     /56 (BP Location: Left arm, Patient Position: Sitting, Cuff Size: Adult) Comment: pt took BP meds around 7 AM  Pulse 74   Temp 97 8 °F (36 6 °C) (Temporal)   Resp 16   Ht 5' 1" (1 549 m)   Wt 75 9 kg (167 lb 4 8 oz)   SpO2 98%   BMI 31 61 kg/m²     Physical Exam  Vitals and nursing note reviewed  Constitutional:       Appearance: He is well-developed  HENT:      Head: Normocephalic and atraumatic  Eyes:      Conjunctiva/sclera: Conjunctivae normal    Cardiovascular:      Rate and Rhythm: Normal rate and regular rhythm  Heart sounds: No murmur heard  Pulmonary:      Effort: Pulmonary effort is normal  No respiratory distress  Breath sounds: Normal breath sounds  Abdominal:      Palpations: Abdomen is soft  Tenderness: There is no abdominal tenderness  Musculoskeletal:      Cervical back: Neck supple  Skin:     General: Skin is warm and dry  Neurological:      Mental Status: He is alert  Mai Arora MD BMI Counseling: Body mass index is 31 61 kg/m²  The BMI is above normal  Nutrition recommendations include reducing portion sizes, decreasing overall calorie intake, 3-5 servings of fruits/vegetables daily, reducing fast food intake, consuming healthier snacks, decreasing soda and/or juice intake, moderation in carbohydrate intake, increasing intake of lean protein, reducing intake of saturated fat and trans fat and reducing intake of cholesterol  Exercise recommendations include moderate aerobic physical activity for 150 minutes/week, exercising 3-5 times per week and strength training exercises

## 2022-02-28 DIAGNOSIS — R06.02 SOB (SHORTNESS OF BREATH): ICD-10-CM

## 2022-09-12 DIAGNOSIS — I82.413 ACUTE DEEP VEIN THROMBOSIS (DVT) OF FEMORAL VEIN OF BOTH LOWER EXTREMITIES (HCC): ICD-10-CM

## 2022-09-12 DIAGNOSIS — I26.99 PULMONARY EMBOLISM WITH INFARCTION (HCC): ICD-10-CM

## 2022-09-13 RX ORDER — RIVAROXABAN 20 MG/1
TABLET, FILM COATED ORAL
Qty: 90 TABLET | Refills: 10 | Status: SHIPPED | OUTPATIENT
Start: 2022-09-13

## 2022-09-19 DIAGNOSIS — R09.81 NASAL CONGESTION: ICD-10-CM

## 2022-09-20 RX ORDER — FLUTICASONE PROPIONATE 50 MCG
SPRAY, SUSPENSION (ML) NASAL
Qty: 16 G | Refills: 3 | Status: SHIPPED | OUTPATIENT
Start: 2022-09-20

## 2022-11-14 DIAGNOSIS — I10 BENIGN ESSENTIAL HYPERTENSION: ICD-10-CM

## 2022-11-15 RX ORDER — LISINOPRIL 20 MG/1
TABLET ORAL
Qty: 90 TABLET | Refills: 2 | Status: SHIPPED | OUTPATIENT
Start: 2022-11-15

## 2022-11-15 RX ORDER — PRAVASTATIN SODIUM 40 MG
TABLET ORAL
Qty: 90 TABLET | Refills: 2 | Status: SHIPPED | OUTPATIENT
Start: 2022-11-15

## 2023-05-31 ENCOUNTER — OFFICE VISIT (OUTPATIENT)
Dept: FAMILY MEDICINE CLINIC | Facility: CLINIC | Age: 68
End: 2023-05-31

## 2023-05-31 VITALS
OXYGEN SATURATION: 97 % | HEIGHT: 62 IN | HEART RATE: 79 BPM | BODY MASS INDEX: 27.79 KG/M2 | SYSTOLIC BLOOD PRESSURE: 166 MMHG | TEMPERATURE: 97.8 F | WEIGHT: 151 LBS | RESPIRATION RATE: 22 BRPM | DIASTOLIC BLOOD PRESSURE: 72 MMHG

## 2023-05-31 DIAGNOSIS — Z23 ENCOUNTER FOR IMMUNIZATION: Primary | ICD-10-CM

## 2023-05-31 DIAGNOSIS — F17.200 SMOKER: ICD-10-CM

## 2023-05-31 DIAGNOSIS — J41.8 MIXED SIMPLE AND MUCOPURULENT CHRONIC BRONCHITIS (HCC): ICD-10-CM

## 2023-05-31 DIAGNOSIS — I73.9 INTERMITTENT CLAUDICATION (HCC): ICD-10-CM

## 2023-05-31 DIAGNOSIS — I10 BENIGN ESSENTIAL HYPERTENSION: ICD-10-CM

## 2023-05-31 DIAGNOSIS — Z78.9 NEED FOR FOLLOW-UP BY SOCIAL WORKER: ICD-10-CM

## 2023-05-31 DIAGNOSIS — I26.99 PULMONARY EMBOLISM WITH INFARCTION (HCC): ICD-10-CM

## 2023-05-31 DIAGNOSIS — E55.9 VITAMIN D DEFICIENCY: ICD-10-CM

## 2023-05-31 PROBLEM — Z12.5 PROSTATE CANCER SCREENING: Status: ACTIVE | Noted: 2023-05-31

## 2023-05-31 RX ORDER — FLUTICASONE FUROATE AND VILANTEROL 100; 25 UG/1; UG/1
1 POWDER RESPIRATORY (INHALATION) DAILY
Qty: 60 EACH | Refills: 3 | Status: SHIPPED | OUTPATIENT
Start: 2023-05-31

## 2023-05-31 RX ORDER — PRAVASTATIN SODIUM 40 MG
40 TABLET ORAL DAILY
Qty: 90 TABLET | Refills: 3 | Status: SHIPPED | OUTPATIENT
Start: 2023-05-31

## 2023-05-31 RX ORDER — LISINOPRIL 20 MG/1
20 TABLET ORAL DAILY
Qty: 90 TABLET | Refills: 3 | Status: SHIPPED | OUTPATIENT
Start: 2023-05-31

## 2023-05-31 NOTE — ASSESSMENT & PLAN NOTE
Ran out of Metoprolol a few weeks ago  Continue Lisinopril 20 mg  Refilled Metoprolol 25 mg BID  Reviewed low sodium diet

## 2023-05-31 NOTE — ASSESSMENT & PLAN NOTE
No acute exacerbations  Continue Lesly Toth, refills sent  Albuterol inhaler as needed  Encouraged smoking cessation

## 2023-05-31 NOTE — PROGRESS NOTES
Name: Ernesto Whitman      : 1955      MRN: [id number]  Encounter Provider: Venus Lance MD  Encounter Date: 2023   Encounter department: [address] [place] [address]     1  Encounter for immunization  -     Pneumococcal Conjugate Vaccine 20-valent (PCV20)    2  Benign essential hypertension  Assessment & Plan:  Ran out of Metoprolol a few weeks ago  Continue Lisinopril 20 mg  Refilled Metoprolol 25 mg BID  Reviewed low sodium diet    Orders:  -     CBC and differential; Future  -     Comprehensive metabolic panel; Future  -     Lipid panel; Future  -     Albumin / creatinine urine ratio; Future  -     TSH, 3rd generation with Free T4 reflex; Future  -     Vitamin D 25 hydroxy; Future  -     Pneumococcal Conjugate Vaccine 20-valent (PCV20)  -     lisinopril (ZESTRIL) 20 mg tablet; Take 1 tablet (20 mg total) by mouth daily  -     metoprolol tartrate (LOPRESSOR) 25 mg tablet; Take 1 tablet (25 mg total) by mouth every 12 (twelve) hours  -     pravastatin (PRAVACHOL) 40 mg tablet; Take 1 tablet (40 mg total) by mouth daily  -     Fluticasone Furoate-Vilanterol (Breo Ellipta) 100-25 mcg/actuation inhaler; Inhale 1 puff daily Rinse mouth after use    3  Mixed simple and mucopurulent chronic bronchitis (HCC)  Assessment & Plan:  No acute exacerbations  Continue Breo Elipta, refills sent  Albuterol inhaler as needed  Encouraged smoking cessation     Orders:  -     Fluticasone Furoate-Vilanterol (Breo Ellipta) 100-25 mcg/actuation inhaler; Inhale 1 puff daily Rinse mouth after use    4  Smoker  Assessment & Plan:  Tobacco Cessation Counseling: Tobacco cessation counseling and education was provided  The patient is sincerely urged to quit consumption of tobacco  He is not ready to quit tobacco  The numerous health risks of tobacco consumption were discussed   If he decides to quit, there are a number of helpful adjunctive aids, and he can see me to discuss nicotine replacement therapy, chantix, or bupropion anytime in the future  5  Pulmonary embolism with infarction [facility])  Assessment & Plan:  Continue Xarelto      6  Vitamin D deficiency  Assessment & Plan:  Check Vit D     Orders:  -     Vitamin D 25 hydroxy; Future    7  Need for follow-up by   -     Ambulatory Referral to Social Work Care Management Program; Future    8  Intermittent claudication (HCC)  Assessment & Plan:  No recent symptoms  Check FLP  Stay physically active              Subjective      76 yo  male with multiple medical comorbid conditions here for follow up  Denies any new complains   Still smokes  Has been using Marijuana which helps with his mood     Review of Systems   All other systems reviewed and are negative  Current Outpatient Medications on File Prior to Visit   Medication Sig   • Aspirin (ASPIR-81 PO) Take by mouth  • Cholecalciferol (D3 DOTS) 2000 UNITS TBDP Take by mouth  • Cholecalciferol (Vitamin D) 50 MCG (2000 UT) tablet Take 1 tablet (2,000 Units total) by mouth daily   • cilostazol (PLETAL) 100 mg tablet Take 100 mg by mouth 2 (two) times a day     • fluticasone (FLONASE) 50 mcg/act nasal spray INHALE 1 SPRAY IN TO EACH NOSTRIL ONCE DAILY   • lisinopril (ZESTRIL) 20 mg tablet Take 1 tablet by mouth daily   • loratadine (CLARITIN) 10 mg tablet Take 1 tablet (10 mg total) by mouth daily   • meloxicam (MOBIC) 7 5 mg tablet Take 1 tablet by mouth 2 (two) times a day as needed   • meloxicam (MOBIC) 7 5 mg tablet TAKE ONE TABLET BY MOUTH ONCE DAILY   • traMADol (ULTRAM) 50 mg tablet Take 1 tablet (50 mg total) by mouth 2 (two) times a day   • Ventolin  (90 Base) MCG/ACT inhaler INHALE 2 PUFFS BY MOUTH EVERY 6 HOURS AS NEEDED FOR WHEEZING   • Xarelto 20 MG tablet TAKE ONE TABLET BY MOUTH ONCE DAILY   • [DISCONTINUED] fluticasone-vilanterol (Breo Ellipta) 100-25 mcg/inh inhaler Inhale 1 puff daily Rinse mouth after use   • [DISCONTINUED] lisinopril "(ZESTRIL) 20 mg tablet TAKE ONE TABLET BY MOUTH ONCE DAILY   • [DISCONTINUED] metoprolol tartrate (LOPRESSOR) 25 mg tablet TAKE ONE TABLET BY MOUTH EVERY 12 HOURS   • [DISCONTINUED] pravastatin (PRAVACHOL) 40 mg tablet TAKE ONE TABLET BY MOUTH ONCE DAILY       Objective     /72 (BP Location: Left arm, Patient Position: Sitting, Cuff Size: Standard)   Pulse 79   Temp 97 8 °F (36 6 °C) (Temporal)   Resp 22   Ht 5' 2\" (1 575 m)   Wt 68 5 kg (151 lb)   SpO2 97%   BMI 27 62 kg/m²     Physical Exam  Vitals and nursing note reviewed  Constitutional:       Appearance: He is well-developed  HENT:      Head: Normocephalic  Right Ear: External ear normal       Left Ear: External ear normal       Nose: Nose normal    Eyes:      Conjunctiva/sclera: Conjunctivae normal       Pupils: Pupils are equal, round, and reactive to light  Neck:      Thyroid: No thyromegaly  Cardiovascular:      Rate and Rhythm: Normal rate and regular rhythm  Heart sounds: Normal heart sounds  Pulmonary:      Effort: Pulmonary effort is normal       Breath sounds: Normal breath sounds  Abdominal:      Palpations: Abdomen is soft  Tenderness: There is no abdominal tenderness  There is no guarding or rebound  Musculoskeletal:         General: Normal range of motion  Cervical back: Normal range of motion and neck supple  Skin:     General: Skin is dry  Neurological:      Mental Status: He is alert and oriented to person, place, and time  Deep Tendon Reflexes: Reflexes are normal and symmetric         Melody Ford MD  "

## 2023-06-01 ENCOUNTER — APPOINTMENT (OUTPATIENT)
Dept: LAB | Facility: CLINIC | Age: 68
End: 2023-06-01
Payer: MEDICARE

## 2023-06-01 DIAGNOSIS — I10 BENIGN ESSENTIAL HYPERTENSION: ICD-10-CM

## 2023-06-01 DIAGNOSIS — E55.9 VITAMIN D DEFICIENCY: ICD-10-CM

## 2023-06-01 LAB
25(OH)D3 SERPL-MCNC: 21.6 NG/ML (ref 30–100)
ALBUMIN SERPL BCP-MCNC: 3.2 G/DL (ref 3.5–5)
ALP SERPL-CCNC: 63 U/L (ref 46–116)
ALT SERPL W P-5'-P-CCNC: 21 U/L (ref 12–78)
ANION GAP SERPL CALCULATED.3IONS-SCNC: -2 MMOL/L (ref 4–13)
AST SERPL W P-5'-P-CCNC: 27 U/L (ref 5–45)
BASOPHILS # BLD AUTO: 0.05 THOUSANDS/ÂΜL (ref 0–0.1)
BASOPHILS NFR BLD AUTO: 1 % (ref 0–1)
BILIRUB SERPL-MCNC: 0.66 MG/DL (ref 0.2–1)
BUN SERPL-MCNC: 11 MG/DL (ref 5–25)
CALCIUM ALBUM COR SERPL-MCNC: 10 MG/DL (ref 8.3–10.1)
CALCIUM SERPL-MCNC: 9.4 MG/DL (ref 8.3–10.1)
CHLORIDE SERPL-SCNC: 107 MMOL/L (ref 96–108)
CHOLEST SERPL-MCNC: 156 MG/DL
CO2 SERPL-SCNC: 30 MMOL/L (ref 21–32)
CREAT SERPL-MCNC: 1.08 MG/DL (ref 0.6–1.3)
CREAT UR-MCNC: 80.1 MG/DL
EOSINOPHIL # BLD AUTO: 0.49 THOUSAND/ÂΜL (ref 0–0.61)
EOSINOPHIL NFR BLD AUTO: 5 % (ref 0–6)
ERYTHROCYTE [DISTWIDTH] IN BLOOD BY AUTOMATED COUNT: 14.1 % (ref 11.6–15.1)
GFR SERPL CREATININE-BSD FRML MDRD: 70 ML/MIN/1.73SQ M
GLUCOSE P FAST SERPL-MCNC: 85 MG/DL (ref 65–99)
HCT VFR BLD AUTO: 41.8 % (ref 36.5–49.3)
HDLC SERPL-MCNC: 48 MG/DL
HGB BLD-MCNC: 13.2 G/DL (ref 12–17)
IMM GRANULOCYTES # BLD AUTO: 0.03 THOUSAND/UL (ref 0–0.2)
IMM GRANULOCYTES NFR BLD AUTO: 0 % (ref 0–2)
LDLC SERPL CALC-MCNC: 91 MG/DL (ref 0–100)
LYMPHOCYTES # BLD AUTO: 3.9 THOUSANDS/ÂΜL (ref 0.6–4.47)
LYMPHOCYTES NFR BLD AUTO: 41 % (ref 14–44)
MCH RBC QN AUTO: 29.7 PG (ref 26.8–34.3)
MCHC RBC AUTO-ENTMCNC: 31.6 G/DL (ref 31.4–37.4)
MCV RBC AUTO: 94 FL (ref 82–98)
MICROALBUMIN UR-MCNC: 13 MG/L (ref 0–20)
MICROALBUMIN/CREAT 24H UR: 16 MG/G CREATININE (ref 0–30)
MONOCYTES # BLD AUTO: 0.82 THOUSAND/ÂΜL (ref 0.17–1.22)
MONOCYTES NFR BLD AUTO: 9 % (ref 4–12)
NEUTROPHILS # BLD AUTO: 4.22 THOUSANDS/ÂΜL (ref 1.85–7.62)
NEUTS SEG NFR BLD AUTO: 44 % (ref 43–75)
NONHDLC SERPL-MCNC: 108 MG/DL
NRBC BLD AUTO-RTO: 0 /100 WBCS
PLATELET # BLD AUTO: 208 THOUSANDS/UL (ref 149–390)
PMV BLD AUTO: 11 FL (ref 8.9–12.7)
POTASSIUM SERPL-SCNC: 5.3 MMOL/L (ref 3.5–5.3)
PROT SERPL-MCNC: 7.6 G/DL (ref 6.4–8.4)
RBC # BLD AUTO: 4.45 MILLION/UL (ref 3.88–5.62)
SODIUM SERPL-SCNC: 135 MMOL/L (ref 135–147)
TRIGL SERPL-MCNC: 86 MG/DL
TSH SERPL DL<=0.05 MIU/L-ACNC: 1.53 UIU/ML (ref 0.45–4.5)
WBC # BLD AUTO: 9.51 THOUSAND/UL (ref 4.31–10.16)

## 2023-06-01 PROCEDURE — 85025 COMPLETE CBC W/AUTO DIFF WBC: CPT

## 2023-06-01 PROCEDURE — 80053 COMPREHEN METABOLIC PANEL: CPT

## 2023-06-01 PROCEDURE — 82306 VITAMIN D 25 HYDROXY: CPT

## 2023-06-01 PROCEDURE — 82570 ASSAY OF URINE CREATININE: CPT

## 2023-06-01 PROCEDURE — 36415 COLL VENOUS BLD VENIPUNCTURE: CPT

## 2023-06-01 PROCEDURE — 82043 UR ALBUMIN QUANTITATIVE: CPT

## 2023-06-01 PROCEDURE — 84443 ASSAY THYROID STIM HORMONE: CPT

## 2023-06-01 PROCEDURE — 80061 LIPID PANEL: CPT

## 2023-06-05 ENCOUNTER — PATIENT OUTREACH (OUTPATIENT)
Dept: FAMILY MEDICINE CLINIC | Facility: CLINIC | Age: 68
End: 2023-06-05

## 2023-06-05 DIAGNOSIS — Z78.9 NEED FOR FOLLOW-UP BY SOCIAL WORKER: Primary | ICD-10-CM

## 2023-06-05 DIAGNOSIS — E55.9 VITAMIN D DEFICIENCY: Primary | ICD-10-CM

## 2023-06-05 RX ORDER — ERGOCALCIFEROL 1.25 MG/1
50000 CAPSULE ORAL WEEKLY
Qty: 12 CAPSULE | Refills: 1 | Status: SHIPPED | OUTPATIENT
Start: 2023-06-05

## 2023-06-05 NOTE — PROGRESS NOTES
MARANDA NAVARRO received referral from provider requesting assistance with waiver application  MARANDA NAVARRO called pt and spoke to pt's dtr  Assessment was completed with pt's dtr  Pt's dtr stated that pt resides with her and her two dtrs in a house  Pt's dtr confirmed that she is looking to apply for waiver for pt as she believes he needs help in the home  Pt stated that pt has difficulty going up and down the steps  She stated that pt's bedroom and bathroom is on the second floor  MARANDA NAVARRO inquired as to pt's ability to complete his ADLs and she stated that he can complete his ADLs but he gets SOB as she thinks he needs assistance  MARANDA NAVARRO inquired bout IADLs and she states he can do it but she thinks he needs assistance  Pt's dtr stated that he needs someone to provide assistance and reminders to pt  She stated that she does work and pt is by himself during the day  MARANDA NAVARRO inquired about fiances and she stats that pt gets around $1000 from [facility]  She states he does not have SNAP benefits as she had applied fro pt and they only gave him less than $20 so it was not worth it  MARANDA NAVARRO inquired about transportation and pt's dtr stated that she provides transportation  MARANDA NAVARRO reviewed waiver requirements and  alternatives to waiver such as the options program and how to go about requesting assistance with pt's dtr  MARANDA NAVARRO reviewed role of [facility] and pt's dtr expressed understanding  Referral sent to [facility]  MARANDA NAVARRO inquired if pt had any other needs and pt's dtr declined any other needs  MARANDA NAVARRO will continue to be available for any additional needs as requested

## 2023-06-07 ENCOUNTER — PATIENT OUTREACH (OUTPATIENT)
Dept: FAMILY MEDICINE CLINIC | Facility: CLINIC | Age: 68
End: 2023-06-07

## 2023-06-07 NOTE — PROGRESS NOTES
Outgoing Call  06/07/2023    [facility] called Poppy Branch on this day regarding referral received from Fabiola Kidd to assist with Waiver Program application  Skyla Shah did not answer at this time  [facility] left voicemail to please call back  [facility] will continue to follow up  Next follow up was scheduled on 06/12/2023

## 2023-06-09 ENCOUNTER — PATIENT OUTREACH (OUTPATIENT)
Dept: FAMILY MEDICINE CLINIC | Facility: CLINIC | Age: 68
End: 2023-06-09

## 2023-06-12 NOTE — PROGRESS NOTES
Incoming Call  06/09/2023    [facility] received phone call from Catherne Lefort Daughter  [facility] introduced herself and her role  Cleveland Muhammad agreed services  [facility] explained application process  Yee expressed understanding  [facility] scheduled Home Visirt on 06/15/2023

## 2023-06-15 ENCOUNTER — PATIENT OUTREACH (OUTPATIENT)
Dept: FAMILY MEDICINE CLINIC | Facility: CLINIC | Age: 68
End: 2023-06-15

## 2023-06-16 NOTE — PROGRESS NOTES
In Person  06/15/2023    [facility] met with Aleksander Rowan, Jeovany's Daughter, on this day regarding Waiver Program application  [facility] explained [employer] application process  Yee expressed understanding  [facility] completed and submitted referral via email  [facility] explained Yee that Riaz Ca will call anytime this coming week to schedule first interview  Yee expressed understanding  [facility] will continue to follow up  Next follow up was scheduled on 06/22/2023

## 2023-06-21 ENCOUNTER — TELEPHONE (OUTPATIENT)
Dept: FAMILY MEDICINE CLINIC | Facility: CLINIC | Age: 68
End: 2023-06-21

## 2023-06-21 ENCOUNTER — PATIENT OUTREACH (OUTPATIENT)
Dept: FAMILY MEDICINE CLINIC | Facility: CLINIC | Age: 68
End: 2023-06-21

## 2023-06-21 NOTE — TELEPHONE ENCOUNTER
[facility] FORM RECEIVED ON 6/21/2023  GIVEN TO MARILYNN MERCHANT TO BE COMPLETED, SIGNED BY MD/ (INCLUDE LISC   NUMBER), AND FAXED BACK IN 3 DAYS

## 2023-06-21 NOTE — PROGRESS NOTES
Outgoing Call  06/21/2023    [address] [place] [address] called [facility] on this day to follow up with waiver Program application  Priscilla Berry from [facility] [place] stated [facility] [place] [facility] schedule first interview on 06/27/2023 etween 1-3pm     [address] [place] [address] sent text message to Lionel Russell Daughter, as reminder of appointment  Yee confirmed message and replied,OK  [address] [place] [address] will continue to follow up  Next follow up was scheduled on 06/28/2023

## 2023-06-30 ENCOUNTER — PATIENT OUTREACH (OUTPATIENT)
Dept: FAMILY MEDICINE CLINIC | Facility: CLINIC | Age: 68
End: 2023-06-30

## 2023-06-30 NOTE — PROGRESS NOTES
Outgoing Call  06/30/2023    [facility] called Evelia Mckinney, Jeovany's Daughter, on this day regarding Waiver Program application  Evelia Mckinney stated that first interview was scheduled on 07/06/2023 at 10:30am  Maura Case stated that [facility] asked to return in Physician Certification  Per Chart Review, IEB/Certification Form have not been complete neither send to IEB  [facility] explained Evelia Hank that certification was given to [facility] for processing on 06/21 and will be send to [facility] as soon is completed  Yee expressed understanding  [facility] will continue to follow up  Next follow up was scheduled on 07/07/2023

## 2023-07-07 ENCOUNTER — PATIENT OUTREACH (OUTPATIENT)
Dept: FAMILY MEDICINE CLINIC | Facility: CLINIC | Age: 68
End: 2023-07-07

## 2023-07-07 NOTE — PROGRESS NOTES
Outgoing Text Message  07/07/2023    [facility] sent text message to Blaire Herrera Daughter to follow up with first interview of Waiver Program.    Shannen Geoffrey stated that everything went well. [facility] explained Shannen Hale that second interview will be schedule with [facility] and they will call anytime this coming week. Yee expressed understanding. [facility] will continue to follow up. Next follow up was scheduled on 07/14/2023.

## 2023-07-14 ENCOUNTER — PATIENT OUTREACH (OUTPATIENT)
Dept: FAMILY MEDICINE CLINIC | Facility: CLINIC | Age: 68
End: 2023-07-14

## 2023-07-14 NOTE — PROGRESS NOTES
Outgoing Call  07/14/2023    [facility] called [facility] on this day to follow up with Waiver Program application. Jenny Sharma from [facility] stated that [place] is processing financial part of the application and letter should be received any time soon. [facility] sent text/voice message to Robles Hall Daughter, explaining that Jeovany needs 5 years of bank statements, also requested call back for better explanation. [facility] will continue to follow up. Next follow up was scheduled on 07/17/2023.

## 2023-07-17 ENCOUNTER — PATIENT OUTREACH (OUTPATIENT)
Dept: FAMILY MEDICINE CLINIC | Facility: CLINIC | Age: 68
End: 2023-07-17

## 2023-07-17 NOTE — PROGRESS NOTES
Outgoing Call  07/17/2023    [facility] called Blaire Nichols, on this day regarding Financial Part of the Waiver Program.    Leonidas Bray did not answer at this time. [facility] left voicemail to please call back. [facility] will continue to follow up. Next follow up was scheduled on 07/19/2023.

## 2023-07-19 ENCOUNTER — PATIENT OUTREACH (OUTPATIENT)
Dept: FAMILY MEDICINE CLINIC | Facility: CLINIC | Age: 68
End: 2023-07-19

## 2023-07-19 NOTE — PROGRESS NOTES
Outgoing Text Message  07/19/2023    [facility] sent text message to Carl Jackson, on this day requesting call back regarding financial part of the Waiver Program.    [facility] received call from Leonidas Bray. [facility] stated that letter from [facility] was received; and she returned in 5 years of bank statements only. [facility] explained that more documents must be need it like [employer],  Letter and written statement regarding taxes and assets. Yee expressed understanding. [facility] asked [facility] to send picture of the letter received so I can explain better what we need and find out who is the  processing application. [facility] will do. [facility] will continue to follow up. Next follow up was scheduled on 07/26/2023.

## 2023-07-26 ENCOUNTER — PATIENT OUTREACH (OUTPATIENT)
Dept: FAMILY MEDICINE CLINIC | Facility: CLINIC | Age: 68
End: 2023-07-26

## 2023-07-27 NOTE — PROGRESS NOTES
Outgoing Call  07/26/2023    [address] [place] [address] 165 called Kei Chase Daughter, on this day to follow up with Financial Part of the Waiver Program.    Easton Aldrich stated that Last Saturday Jeovany moved with her sister; also Easton Aldrich denied continue application process. Kali Tran is an emergency contact listed. [facility] will call Luzmaria Jama to continue application process. Next follow up was scheduled on 07/31/2023.

## 2023-07-30 PROBLEM — Z12.5 PROSTATE CANCER SCREENING: Status: RESOLVED | Noted: 2023-05-31 | Resolved: 2023-07-30

## 2023-08-04 DIAGNOSIS — I10 BENIGN ESSENTIAL HYPERTENSION: ICD-10-CM

## 2023-08-05 RX ORDER — LISINOPRIL 20 MG/1
20 TABLET ORAL DAILY
Qty: 90 TABLET | Refills: 2 | OUTPATIENT
Start: 2023-08-05

## 2023-08-09 ENCOUNTER — PATIENT OUTREACH (OUTPATIENT)
Dept: FAMILY MEDICINE CLINIC | Facility: CLINIC | Age: 68
End: 2023-08-09

## 2023-08-09 NOTE — PROGRESS NOTES
Outgoing Call  08/09/2023    [facility] called [facility] on this day regarding Waiver Program application. Ese from [facility] stated that application was closed today 08/09 by the [facility] due [address] [place] [address] failed returning documents requested. Also Ese suggested to call Mr Bonnie Morel, , from [facility] to follow up with documents need it. CMOC called Nona Lees Daughter, to discuss Waiver Program/Home Care Services due Sara Kaminski Daughter, denied to continue working application process with [facility]. Emma Otero did not answer at this time. [address] left voicemail to please call back. Later on this day, [facility] received call from Emma Otero. [facility] introduced herself and her role Emma Otero and [address] [place] [address] agreed services. [facility] explained Coco that [place] closed application due documents requested to process financial part were not return in.    [facility] explained Emma Otero that [address] [place] [address] needs to provide copy of  letter, life insurance cash value and written statement signed by [address] [place] [address] stating that he does not file taxes neither is claimed as dependent must be in hand before [facility] reopen application. Emma Otero expressed understanding and will call [facility] as soon documents are ready to . [facility] will continue to follow up. Next follow up was scheduled on 08/16/2023.

## 2023-08-16 ENCOUNTER — PATIENT OUTREACH (OUTPATIENT)
Dept: FAMILY MEDICINE CLINIC | Facility: CLINIC | Age: 68
End: 2023-08-16

## 2023-08-16 NOTE — PROGRESS NOTES
Outgoing Call  08/16/2023    [facility] called Luis Felipe Vo Daughter, on this day regarding Waiver Program/Financial Part. Alonzoraulito Adams stated that letter from [employer] have not been received yet. Also Faiza Adams is confused due [facility] is paid by herself and have nothing to do with Jeovany, he is only the insured. [address] [place] [address] asked Faiza Adams to write statement for the  explaining that Bishop Hernandez is only the insured. Faiza Adams will do. [address] [place] [address] 165 will continue to follow up. Next follow up was scheduled on 08/23/2023.

## 2023-08-24 ENCOUNTER — PATIENT OUTREACH (OUTPATIENT)
Dept: FAMILY MEDICINE CLINIC | Facility: CLINIC | Age: 68
End: 2023-08-24

## 2023-08-24 NOTE — PROGRESS NOTES
Chart Review  08/24/2023    [facility] received text message from Dejuan Flores Daughter. Kayla sent [employer] letter along with Ida's statement regarding policy hidalgo. [facility] will contact  from [address] [place] [address] to request reconsideration. [facility] submitted via email documents required to process financial part of the application. [facility] will continue to follow up. Next follow up was scheduled on 08/29/2023.

## 2023-08-29 DIAGNOSIS — I26.99 PULMONARY EMBOLISM WITH INFARCTION (HCC): ICD-10-CM

## 2023-08-29 DIAGNOSIS — I82.413 ACUTE DEEP VEIN THROMBOSIS (DVT) OF FEMORAL VEIN OF BOTH LOWER EXTREMITIES (HCC): ICD-10-CM

## 2023-08-29 RX ORDER — RIVAROXABAN 20 MG/1
TABLET, FILM COATED ORAL
Qty: 90 TABLET | Refills: 12 | Status: SHIPPED | OUTPATIENT
Start: 2023-08-29

## 2023-08-30 ENCOUNTER — PATIENT OUTREACH (OUTPATIENT)
Dept: FAMILY MEDICINE CLINIC | Facility: CLINIC | Age: 68
End: 2023-08-30

## 2023-08-31 NOTE — PROGRESS NOTES
Outgoing Call  08/30/2023    [address] [place] [address] called [facility] on this day to follow up with Waiver Program/Financial Part approval.    Shamar James from [facility] stated that [place] still processing financial part and suggested to call the [facility]. [facility] will do. [address] [place] [address] sent email to Ms Dae Sharpe from [address] [place] [address]. Ms Dae Sharpe sent notification to  working application to please reconsider Efmei case. [address] [place] [address] sent text message to Minda Rees Daughter, explained that application still processing for financial approval. Triston Schroeder expressed understanding. [address] [place] [address] will continue to follow up. Next follow up was scheduled on 09/07/2023.

## 2023-09-06 ENCOUNTER — PATIENT OUTREACH (OUTPATIENT)
Dept: FAMILY MEDICINE CLINIC | Facility: CLINIC | Age: 68
End: 2023-09-06

## 2023-09-07 NOTE — PROGRESS NOTES
Incoming Call  09/06/2023    [facility] recceived phone call from Joseph Bingham,  assigned to [address]. Jose Worthy stated that she tried multiple of times to contact Jeovany with no success. Jose Worthy requested [facility] assistance to get in contact with daughter. [facility] explained Esperanza that [address] moved with another daughter, Shadi Hirsch. CMOC provided phone number. CMOC called Shadi Hirsch to explained that Jose Worthy is the  and assessment must be complete in order to start home care services. Shadi Hirsch stated that she will be the caregiver. CMOC explained Shadi Hirsch that referral will be place to [address] to start hiring process. Shadi Hirsch was very thankful and expressed understanding. CMOC called Yara Newton from [address] and referral was placed for [facility]'s hiring process. [facility] will continue to follow up. Next follow up was scheduled on 09/13/2023.

## 2023-09-13 ENCOUNTER — PATIENT OUTREACH (OUTPATIENT)
Dept: FAMILY MEDICINE CLINIC | Facility: CLINIC | Age: 68
End: 2023-09-13

## 2023-09-13 NOTE — PROGRESS NOTES
Outgoing Text Message  09/13/2023    [facility] sent text message to Alex Waddell Daughter, regarding Home Care Hours approval.    Farshad Kitchen stated that she completed hiring process with [facility] [address] but Jm Adkins have not call regarding hours approved. OC recommended to call DOMINGO Mata to follow up. Farshad Kitchen expressed understanding and will do. [facility] will continue to follow up. Next follow up was scheduled on 09/18/2023.

## 2023-09-18 ENCOUNTER — PATIENT OUTREACH (OUTPATIENT)
Dept: FAMILY MEDICINE CLINIC | Facility: CLINIC | Age: 68
End: 2023-09-18

## 2023-09-18 NOTE — PROGRESS NOTES
Incoming Call  09/18/2023    [address] [place] [address] 165 received phone call from Charline on this day regarding authorization of [id number]. Arnaud Johnson stated that [facility] sent wrong authorization to start home care services on October 2023. AA already contacted [facility] asking for correction. [facility] will do. [address] [place] [address] 165 sent text message to Sandra, [facility] Daughter. OC explained Sandra that [facility] will be calling anytime this week to [address] [place] [address] for [address]. No answers have been received yet. [address] [place] [address] 165 will continue to follow up. Next follow up was scheduled on 09/25/2023.

## 2023-09-25 ENCOUNTER — PATIENT OUTREACH (OUTPATIENT)
Dept: FAMILY MEDICINE CLINIC | Facility: CLINIC | Age: 68
End: 2023-09-25

## 2023-09-25 NOTE — PROGRESS NOTES
Outgoing Call  09/25/2023    [facility] called Ladora Olszewski, Jeovany's Daughter, on this day regarding [address]. Ladora Olszewski stated that [address] were established last Friday and she was able to be paid caregiver with [address] [place] [address]. Dina Olszewski stated that Omayra Yu was approved for 39 weekly hours Monday to Friday. Tiffanie Goldstein will be receiving anytime soon meals and Life Alert. [facility] explained Dina Zhuzewski that any changes on his home care must be address to [place], [place] ([phone]). [place] Olszewski expressed understanding. Also CMOC explained Dina Olszewski that this referral will be closed on my behalf due goal was met. Dina Olszewski was very thankful for [facility] assistance and expressed understanding. No follow up was scheduled at this time. [facility] will closed this referral received from Lexis Jacobson to assist with [phone] today 09/25/2023 due goal met.

## 2023-09-29 ENCOUNTER — OFFICE VISIT (OUTPATIENT)
Dept: FAMILY MEDICINE CLINIC | Facility: CLINIC | Age: 68
End: 2023-09-29

## 2023-09-29 VITALS
WEIGHT: 140.2 LBS | TEMPERATURE: 98.2 F | DIASTOLIC BLOOD PRESSURE: 64 MMHG | HEIGHT: 62 IN | BODY MASS INDEX: 25.8 KG/M2 | RESPIRATION RATE: 18 BRPM | HEART RATE: 57 BPM | SYSTOLIC BLOOD PRESSURE: 152 MMHG | OXYGEN SATURATION: 98 %

## 2023-09-29 DIAGNOSIS — R06.02 SOB (SHORTNESS OF BREATH): ICD-10-CM

## 2023-09-29 DIAGNOSIS — I10 BENIGN ESSENTIAL HYPERTENSION: ICD-10-CM

## 2023-09-29 DIAGNOSIS — Z23 ENCOUNTER FOR IMMUNIZATION: ICD-10-CM

## 2023-09-29 DIAGNOSIS — G47.62 NOCTURNAL LEG CRAMPS: ICD-10-CM

## 2023-09-29 DIAGNOSIS — J41.8 MIXED SIMPLE AND MUCOPURULENT CHRONIC BRONCHITIS (HCC): ICD-10-CM

## 2023-09-29 DIAGNOSIS — E55.9 VITAMIN D DEFICIENCY: ICD-10-CM

## 2023-09-29 DIAGNOSIS — M62.838 MUSCLE SPASM: Primary | ICD-10-CM

## 2023-09-29 LAB
DME PARACHUTE DELIVERY DATE REQUESTED: NORMAL
DME PARACHUTE ITEM DESCRIPTION: NORMAL
DME PARACHUTE ORDER STATUS: NORMAL
DME PARACHUTE SUPPLIER NAME: NORMAL
DME PARACHUTE SUPPLIER PHONE: NORMAL

## 2023-09-29 PROCEDURE — 3077F SYST BP >= 140 MM HG: CPT | Performed by: FAMILY MEDICINE

## 2023-09-29 PROCEDURE — 99214 OFFICE O/P EST MOD 30 MIN: CPT | Performed by: FAMILY MEDICINE

## 2023-09-29 PROCEDURE — 90662 IIV NO PRSV INCREASED AG IM: CPT | Performed by: FAMILY MEDICINE

## 2023-09-29 PROCEDURE — 3078F DIAST BP <80 MM HG: CPT | Performed by: FAMILY MEDICINE

## 2023-09-29 PROCEDURE — G0008 ADMIN INFLUENZA VIRUS VAC: HCPCS | Performed by: FAMILY MEDICINE

## 2023-09-29 RX ORDER — ERGOCALCIFEROL 1.25 MG/1
50000 CAPSULE ORAL WEEKLY
Qty: 12 CAPSULE | Refills: 1 | Status: SHIPPED | OUTPATIENT
Start: 2023-09-29

## 2023-09-29 RX ORDER — FLUTICASONE FUROATE AND VILANTEROL 100; 25 UG/1; UG/1
1 POWDER RESPIRATORY (INHALATION) DAILY
Qty: 60 EACH | Refills: 3 | Status: SHIPPED | OUTPATIENT
Start: 2023-09-29

## 2023-09-29 RX ORDER — ALBUTEROL SULFATE 90 UG/1
2 AEROSOL, METERED RESPIRATORY (INHALATION) EVERY 6 HOURS PRN
Qty: 18 G | Refills: 0 | Status: SHIPPED | OUTPATIENT
Start: 2023-09-29

## 2023-09-29 RX ORDER — CYCLOBENZAPRINE HCL 5 MG
5 TABLET ORAL 2 TIMES DAILY PRN
Qty: 60 TABLET | Refills: 0 | Status: SHIPPED | OUTPATIENT
Start: 2023-09-29

## 2023-09-29 NOTE — PROGRESS NOTES
Name: Ema Patel      : 1955      MRN: [id number]  Encounter Provider: Juan Alejandre MD  Encounter Date: 2023   Encounter department: [facility]     1. Muscle spasm  -     cyclobenzaprine (FLEXERIL) 5 mg tablet; Take 1 tablet (5 mg total) by mouth 2 (two) times a day as needed for muscle spasms    2. Benign essential hypertension  -     Fluticasone Furoate-Vilanterol (Breo Ellipta) 100-25 mcg/actuation inhaler; Inhale 1 puff daily Rinse mouth after use    3. Mixed simple and mucopurulent chronic bronchitis (HCC)  -     Fluticasone Furoate-Vilanterol (Breo Ellipta) 100-25 mcg/actuation inhaler; Inhale 1 puff daily Rinse mouth after use    4. Vitamin D deficiency  -     ergocalciferol (VITAMIN D2) 50,000 units; Take 1 capsule (50,000 Units total) by mouth once a week    5. SOB (shortness of breath)  -     albuterol (Ventolin HFA) 90 mcg/act inhaler; Inhale 2 puffs every 6 (six) hours as needed for wheezing    6. Encounter for immunization  -     FLUZONE HIGH-DOSE: influenza vaccine, high-dose, preservative-free 0.7 mL    7. Nocturnal leg cramps  Comments:  Start 4 ounces Tonic water qHs            Subjective      60 yo  male with hyperlipidemia, HTN, CAD, s/p DVT and PE on Xarelto here with the following complains:  1- Neck and shoulder pain. 5-/10, described as a spasm. On and off, worse in the am when he first gets up  2- Nocturnal leg cramps     Review of Systems   Musculoskeletal: Positive for arthralgias, back pain and neck pain. All other systems reviewed and are negative. Current Outpatient Medications on File Prior to Visit   Medication Sig   • Aspirin (ASPIR-81 PO) Take by mouth. • Cholecalciferol (Vitamin D) 50 MCG (2000 UT) tablet Take 1 tablet (2,000 Units total) by mouth daily   • cilostazol (PLETAL) 100 mg tablet Take 100 mg by mouth 2 (two) times a day.    • fluticasone (FLONASE) 50 mcg/act nasal spray INHALE 1 SPRAY IN TO EACH NOSTRIL ONCE DAILY   • lisinopril (ZESTRIL) 20 mg tablet Take 1 tablet by mouth daily   • lisinopril (ZESTRIL) 20 mg tablet Take 1 tablet (20 mg total) by mouth daily   • loratadine (CLARITIN) 10 mg tablet Take 1 tablet (10 mg total) by mouth daily   • meloxicam (MOBIC) 7.5 mg tablet Take 1 tablet by mouth 2 (two) times a day as needed   • metoprolol tartrate (LOPRESSOR) 25 mg tablet Take 1 tablet (25 mg total) by mouth every 12 (twelve) hours   • pravastatin (PRAVACHOL) 40 mg tablet Take 1 tablet (40 mg total) by mouth daily   • traMADol (ULTRAM) 50 mg tablet Take 1 tablet (50 mg total) by mouth 2 (two) times a day   • Xarelto 20 MG tablet TAKE ONE TABLET BY MOUTH ONCE DAILY   • [DISCONTINUED] Cholecalciferol (D3 DOTS) 2000 UNITS TBDP Take by mouth. • [DISCONTINUED] ergocalciferol (VITAMIN D2) 50,000 units Take 1 capsule (50,000 Units total) by mouth once a week   • [DISCONTINUED] Fluticasone Furoate-Vilanterol (Breo Ellipta) 100-25 mcg/actuation inhaler Inhale 1 puff daily Rinse mouth after use   • [DISCONTINUED] meloxicam (MOBIC) 7.5 mg tablet TAKE ONE TABLET BY MOUTH ONCE DAILY   • [DISCONTINUED] Ventolin  (90 Base) MCG/ACT inhaler INHALE 2 PUFFS BY MOUTH EVERY 6 HOURS AS NEEDED FOR WHEEZING       Objective     /64 (BP Location: Right arm, Patient Position: Sitting, Cuff Size: Standard)   Pulse 57   Temp 98.2 °F (36.8 °C) (Temporal)   Resp 18   Ht 5' 2" (1.575 m)   Wt 63.6 kg (140 lb 3.2 oz)   SpO2 98%   BMI 25.64 kg/m²     Physical Exam  Vitals and nursing note reviewed. Constitutional:       Appearance: He is well-developed. HENT:      Head: Normocephalic. Right Ear: External ear normal.      Left Ear: External ear normal.      Nose: Nose normal.   Eyes:      Conjunctiva/sclera: Conjunctivae normal.      Pupils: Pupils are equal, round, and reactive to light. Neck:      Thyroid: No thyromegaly.    Cardiovascular:      Rate and Rhythm: Normal rate and regular rhythm. Heart sounds: Normal heart sounds. Pulmonary:      Effort: Pulmonary effort is normal.      Breath sounds: Normal breath sounds. Abdominal:      Palpations: Abdomen is soft. Tenderness: There is no abdominal tenderness. There is no guarding or rebound. Musculoskeletal:         General: Tenderness present. Normal range of motion. Cervical back: Normal range of motion and neck supple. Spasms and tenderness present. Thoracic back: Spasms present. Skin:     General: Skin is dry. Neurological:      Mental Status: He is alert and oriented to person, place, and time. Deep Tendon Reflexes: Reflexes are normal and symmetric. Cezar Lyles MD BMI Counseling: Body mass index is 25.64 kg/m². The BMI is above normal. Nutrition recommendations include reducing portion sizes, 3-5 servings of fruits/vegetables daily, consuming healthier snacks and decreasing soda and/or juice intake. Exercise recommendations include moderate aerobic physical activity for 150 minutes/week and strength training exercises.

## 2023-10-02 ENCOUNTER — PATIENT OUTREACH (OUTPATIENT)
Dept: FAMILY MEDICINE CLINIC | Facility: CLINIC | Age: 68
End: 2023-10-02

## 2023-10-02 LAB
DME PARACHUTE DELIVERY DATE ACTUAL: NORMAL
DME PARACHUTE DELIVERY DATE REQUESTED: NORMAL
DME PARACHUTE ITEM DESCRIPTION: NORMAL
DME PARACHUTE ORDER STATUS: NORMAL
DME PARACHUTE SUPPLIER NAME: NORMAL
DME PARACHUTE SUPPLIER PHONE: NORMAL

## 2023-10-02 NOTE — PROGRESS NOTES
MARANDA NAVARRO received confirmation from [address] [place] [address] that pt now has waiver services and they closed the referral. MARANDA CM will close referral at this time as pt's goals has been met. MARANDA CM will be available for any additional needs as requested.

## 2023-10-10 DIAGNOSIS — I10 BENIGN ESSENTIAL HYPERTENSION: ICD-10-CM

## 2023-10-10 DIAGNOSIS — M62.838 MUSCLE SPASM: ICD-10-CM

## 2023-10-10 DIAGNOSIS — R06.02 SOB (SHORTNESS OF BREATH): ICD-10-CM

## 2023-10-11 RX ORDER — ALBUTEROL SULFATE 90 UG/1
AEROSOL, METERED RESPIRATORY (INHALATION)
Qty: 18 G | Refills: 0 | Status: SHIPPED | OUTPATIENT
Start: 2023-10-11

## 2023-10-11 RX ORDER — CYCLOBENZAPRINE HCL 5 MG
TABLET ORAL
Qty: 60 TABLET | Refills: 0 | Status: SHIPPED | OUTPATIENT
Start: 2023-10-11

## 2023-11-06 DIAGNOSIS — M62.838 MUSCLE SPASM: ICD-10-CM

## 2023-11-07 RX ORDER — CYCLOBENZAPRINE HCL 5 MG
TABLET ORAL
Qty: 60 TABLET | Refills: 0 | Status: SHIPPED | OUTPATIENT
Start: 2023-11-07

## 2023-11-28 DIAGNOSIS — M62.838 MUSCLE SPASM: ICD-10-CM

## 2023-11-28 RX ORDER — CYCLOBENZAPRINE HCL 5 MG
TABLET ORAL
Qty: 60 TABLET | Refills: 0 | Status: SHIPPED | OUTPATIENT
Start: 2023-11-28

## 2023-11-30 ENCOUNTER — OFFICE VISIT (OUTPATIENT)
Dept: FAMILY MEDICINE CLINIC | Facility: CLINIC | Age: 68
End: 2023-11-30

## 2023-11-30 VITALS
WEIGHT: 144.3 LBS | HEART RATE: 60 BPM | OXYGEN SATURATION: 99 % | DIASTOLIC BLOOD PRESSURE: 68 MMHG | BODY MASS INDEX: 26.39 KG/M2 | TEMPERATURE: 97 F | SYSTOLIC BLOOD PRESSURE: 138 MMHG

## 2023-11-30 DIAGNOSIS — I73.9 INTERMITTENT CLAUDICATION (HCC): Primary | ICD-10-CM

## 2023-11-30 DIAGNOSIS — E55.9 VITAMIN D DEFICIENCY: ICD-10-CM

## 2023-11-30 DIAGNOSIS — I10 BENIGN ESSENTIAL HYPERTENSION: ICD-10-CM

## 2023-11-30 DIAGNOSIS — G47.62 NOCTURNAL LEG CRAMPS: ICD-10-CM

## 2023-11-30 DIAGNOSIS — R60.1 GENERALIZED EDEMA: ICD-10-CM

## 2023-11-30 DIAGNOSIS — I70.213 ATHEROSCLEROSIS OF NATIVE ARTERY OF BOTH LOWER EXTREMITIES WITH INTERMITTENT CLAUDICATION (HCC): ICD-10-CM

## 2023-11-30 PROCEDURE — 3075F SYST BP GE 130 - 139MM HG: CPT | Performed by: FAMILY MEDICINE

## 2023-11-30 PROCEDURE — 99214 OFFICE O/P EST MOD 30 MIN: CPT | Performed by: FAMILY MEDICINE

## 2023-11-30 PROCEDURE — 3078F DIAST BP <80 MM HG: CPT | Performed by: FAMILY MEDICINE

## 2023-11-30 RX ORDER — LISINOPRIL 20 MG/1
20 TABLET ORAL DAILY
Qty: 90 TABLET | Refills: 3 | Status: SHIPPED | OUTPATIENT
Start: 2023-11-30

## 2023-11-30 RX ORDER — CHOLECALCIFEROL (VITAMIN D3) 50 MCG
2000 TABLET ORAL DAILY
Qty: 90 TABLET | Refills: 3 | Status: SHIPPED | OUTPATIENT
Start: 2023-11-30

## 2023-11-30 NOTE — PROGRESS NOTES
Assessment/Plan:    Pulmonary embolism with infarction ([address])  Continue Xarelto    Atherosclerosis of native artery of extremity with intermittent claudication (HCC)  Referred for Vascular evaluation given new onset pain  Continue statin        Diagnoses and all orders for this visit:    Intermittent claudication [facility])  -     Ambulatory Referral to Vascular Surgery; Future  -     VAS lower limb venous duplex study, complete bilateral; Future    Nocturnal leg cramps  -     Ambulatory Referral to Vascular Surgery; Future  -     VAS lower limb venous duplex study, complete bilateral; Future    Generalized edema  -     VAS lower limb venous duplex study, complete bilateral; Future    Benign essential hypertension  -     lisinopril (ZESTRIL) 20 mg tablet; Take 1 tablet (20 mg total) by mouth daily    Vitamin D deficiency  -     Cholecalciferol (Vitamin D) 50 MCG (2000 UT) tablet; Take 1 tablet (2,000 Units total) by mouth daily    Atherosclerosis of native artery of both lower extremities with intermittent claudication (HCC)          Subjective:      Patient ID: Hua Khanna is a 70 y.o. male. 69 yo complains of pain described as a feeling of "tightness" in the back of his R leg and on his calf accompanied by nocturnal cramps. Pain is slightly worse when walking quickly or up the steps         The following portions of the patient's history were reviewed and updated as appropriate: He  has a past medical history of DVT (deep venous thrombosis) ([address]), Hypercholesterolemia, Hypertension, and Myocardial infarction ([address]).   He   Patient Active Problem List    Diagnosis Date Noted   • COVID 01/17/2022   • Obesity (BMI 30.0-34.9) 04/29/2020   • Smoker 04/29/2020   • Nasal congestion 11/04/2019   • Hypercholesterolemia 10/17/2018   • Mixed simple and mucopurulent chronic bronchitis ([address]) 10/17/2018   • Rib pain on right side 03/19/2018   • Abdominal adhesions 12/11/2017   • Benign essential hypertension 01/18/2017   • Aphagia 08/02/2016   • Choking sensation 08/02/2016   • Vitamin D deficiency 04/04/2016   • Heel spur 03/17/2016   • Arch pain, left 11/16/2015   • Intermittent claudication (HCC) 11/16/2015   • Limb swelling 11/16/2015   • Tinea pedis of both feet 11/16/2015   • Plantar fasciitis 11/04/2015   • Testicular discomfort 11/04/2015   • Low back pain 06/04/2015   • Erectile dysfunction of non-organic origin 07/17/2014   • Atherosclerosis of native artery of extremity with intermittent claudication ([address]) 05/21/2014   • Peripheral vascular disease ([address]) 05/21/2014   • Acute deep vein thrombosis (DVT) of both lower extremities ([address]) 03/24/2014   • Pulmonary embolism with infarction ([address]) 03/21/2013     He  has a past surgical history that includes Vascular surgery; pr colonoscopy flx dx w/collj spec when pfrmd (N/A, 05/17/2016); Anterior compartment decompression; Angioplasty; and Coronary artery bypass graft. His family history includes Diabetes type II in his father and mother; Heart attack in his father and mother; Heart disease in his father and mother; Lung cancer in his father and mother; Other in his mother. He  reports that he has been smoking. He has never used smokeless tobacco. He reports current alcohol use. He reports current drug use. Frequency: 7.00 times per week. Drug: Marijuana. Current Outpatient Medications   Medication Sig Dispense Refill   • Aspirin (ASPIR-81 PO) Take by mouth. • Cholecalciferol (Vitamin D) 50 MCG (2000 UT) tablet Take 1 tablet (2,000 Units total) by mouth daily 90 tablet 3   • cilostazol (PLETAL) 100 mg tablet Take 100 mg by mouth 2 (two) times a day.      • cyclobenzaprine (FLEXERIL) 5 mg tablet TAKE ONE TABLET BY MOUTH TWICE A DAY AS NEEDED FOR MUSCLE SPASMS 60 tablet 0   • ergocalciferol (VITAMIN D2) 50,000 units Take 1 capsule (50,000 Units total) by mouth once a week 12 capsule 1   • fluticasone (FLONASE) 50 mcg/act nasal spray INHALE 1 SPRAY IN TO EACH NOSTRIL ONCE DAILY 16 g 3   • Fluticasone Furoate-Vilanterol (Breo Ellipta) 100-25 mcg/actuation inhaler Inhale 1 puff daily Rinse mouth after use 60 each 3   • lisinopril (ZESTRIL) 20 mg tablet Take 1 tablet by mouth daily     • lisinopril (ZESTRIL) 20 mg tablet Take 1 tablet (20 mg total) by mouth daily 90 tablet 3   • loratadine (CLARITIN) 10 mg tablet Take 1 tablet (10 mg total) by mouth daily 90 tablet 1   • meloxicam (MOBIC) 7.5 mg tablet Take 1 tablet by mouth 2 (two) times a day as needed     • metoprolol tartrate (LOPRESSOR) 25 mg tablet TAKE ONE TABLET BY MOUTH EVERY 12 HOURS 90 tablet 2   • pravastatin (PRAVACHOL) 40 mg tablet Take 1 tablet (40 mg total) by mouth daily 90 tablet 3   • traMADol (ULTRAM) 50 mg tablet Take 1 tablet (50 mg total) by mouth 2 (two) times a day 30 tablet 0   • Ventolin  (90 Base) MCG/ACT inhaler INHALE 2 PUFFS BY MOUTH EVERY SIX HOURS AS NEEDED FOR WHEEZING 18 g 0   • Xarelto 20 MG tablet TAKE ONE TABLET BY MOUTH ONCE DAILY 90 tablet 12     No current facility-administered medications for this visit. Current Outpatient Medications on File Prior to Visit   Medication Sig   • Aspirin (ASPIR-81 PO) Take by mouth. • cilostazol (PLETAL) 100 mg tablet Take 100 mg by mouth 2 (two) times a day.    • cyclobenzaprine (FLEXERIL) 5 mg tablet TAKE ONE TABLET BY MOUTH TWICE A DAY AS NEEDED FOR MUSCLE SPASMS   • ergocalciferol (VITAMIN D2) 50,000 units Take 1 capsule (50,000 Units total) by mouth once a week   • fluticasone (FLONASE) 50 mcg/act nasal spray INHALE 1 SPRAY IN TO EACH NOSTRIL ONCE DAILY   • Fluticasone Furoate-Vilanterol (Breo Ellipta) 100-25 mcg/actuation inhaler Inhale 1 puff daily Rinse mouth after use   • lisinopril (ZESTRIL) 20 mg tablet Take 1 tablet by mouth daily   • loratadine (CLARITIN) 10 mg tablet Take 1 tablet (10 mg total) by mouth daily   • meloxicam (MOBIC) 7.5 mg tablet Take 1 tablet by mouth 2 (two) times a day as needed   • metoprolol tartrate (LOPRESSOR) 25 mg tablet TAKE ONE TABLET BY MOUTH EVERY 12 HOURS   • pravastatin (PRAVACHOL) 40 mg tablet Take 1 tablet (40 mg total) by mouth daily   • traMADol (ULTRAM) 50 mg tablet Take 1 tablet (50 mg total) by mouth 2 (two) times a day   • Ventolin  (90 Base) MCG/ACT inhaler INHALE 2 PUFFS BY MOUTH EVERY SIX HOURS AS NEEDED FOR WHEEZING   • Xarelto 20 MG tablet TAKE ONE TABLET BY MOUTH ONCE DAILY   • [DISCONTINUED] Cholecalciferol (Vitamin D) 50 MCG (2000 UT) tablet Take 1 tablet (2,000 Units total) by mouth daily   • [DISCONTINUED] lisinopril (ZESTRIL) 20 mg tablet Take 1 tablet (20 mg total) by mouth daily     No current facility-administered medications on file prior to visit. He is allergic to penicillins. .    Review of Systems   Musculoskeletal:         As per HPI   All other systems reviewed and are negative. Objective:      /68   Pulse 60   Temp (!) 97 °F (36.1 °C) (Temporal)   Wt 65.5 kg (144 lb 4.8 oz)   SpO2 99%   BMI 26.39 kg/m²          Physical Exam  Vitals and nursing note reviewed. Constitutional:       Appearance: He is well-developed. HENT:      Head: Normocephalic. Right Ear: External ear normal.      Left Ear: External ear normal.      Nose: Nose normal.   Eyes:      Conjunctiva/sclera: Conjunctivae normal.      Pupils: Pupils are equal, round, and reactive to light. Neck:      Thyroid: No thyromegaly. Cardiovascular:      Rate and Rhythm: Normal rate and regular rhythm. Heart sounds: Normal heart sounds. Pulmonary:      Effort: Pulmonary effort is normal.      Breath sounds: Normal breath sounds. Abdominal:      Palpations: Abdomen is soft. Tenderness: There is no abdominal tenderness. There is no guarding or rebound. Musculoskeletal:         General: Normal range of motion. Cervical back: Normal range of motion and neck supple. Right lower le+ Edema present. Left lower le+ Edema present. Skin:     General: Skin is dry. Neurological:      Mental Status: He is alert and oriented to person, place, and time. Deep Tendon Reflexes: Reflexes are normal and symmetric.

## 2023-12-01 ENCOUNTER — TELEPHONE (OUTPATIENT)
Dept: VASCULAR SURGERY | Facility: CLINIC | Age: 68
End: 2023-12-01

## 2023-12-01 ENCOUNTER — HOSPITAL ENCOUNTER (OUTPATIENT)
Dept: NON INVASIVE DIAGNOSTICS | Facility: CLINIC | Age: 68
Discharge: HOME | End: 2023-12-01
Payer: MEDICARE

## 2023-12-01 DIAGNOSIS — R60.1 GENERALIZED EDEMA: ICD-10-CM

## 2023-12-01 DIAGNOSIS — G47.62 NOCTURNAL LEG CRAMPS: ICD-10-CM

## 2023-12-01 DIAGNOSIS — I73.9 INTERMITTENT CLAUDICATION (HCC): ICD-10-CM

## 2023-12-01 PROCEDURE — 93925 LOWER EXTREMITY STUDY: CPT | Performed by: SURGERY

## 2023-12-01 PROCEDURE — 93923 UPR/LXTR ART STDY 3+ LVLS: CPT

## 2023-12-01 PROCEDURE — 93925 LOWER EXTREMITY STUDY: CPT

## 2023-12-01 PROCEDURE — 93922 UPR/L XTREMITY ART 2 LEVELS: CPT | Performed by: SURGERY

## 2023-12-01 NOTE — TELEPHONE ENCOUNTER
Ana delcid called with doppler results ,  this patient has been referred to vascular , but has not het made an appt and his family is planning to call. Per Ginny Ricci patient relays a history of bilateral fem pop bypasses from 20 yelars ago in [facility] and has a scar on each leg from groin to knee. However, on duplex , she is unable to see any bypass graft and patient has no records with him. The doppler performed today showed right leg normal TRISTON and mild tibial peroneal disease. On the left leg there is distal SFA and popliteal chronic occlusion . TRISTON is . 49 and toe pressure within healing range. The patient has no complaints of rest pain and Ginny Ricci said there is no tissue loss. She was unable to contact the ordering physician , but she wanted to report it to us and I will have call center contact patient for an appt. Sent to triage for their review also.

## 2023-12-18 ENCOUNTER — OFFICE VISIT (OUTPATIENT)
Dept: FAMILY MEDICINE CLINIC | Facility: CLINIC | Age: 68
End: 2023-12-18

## 2023-12-18 VITALS
BODY MASS INDEX: 25.95 KG/M2 | TEMPERATURE: 97 F | SYSTOLIC BLOOD PRESSURE: 134 MMHG | HEART RATE: 65 BPM | OXYGEN SATURATION: 98 % | WEIGHT: 141.9 LBS | DIASTOLIC BLOOD PRESSURE: 60 MMHG

## 2023-12-18 DIAGNOSIS — M54.50 CHRONIC MIDLINE LOW BACK PAIN WITHOUT SCIATICA: ICD-10-CM

## 2023-12-18 DIAGNOSIS — G89.29 CHRONIC MIDLINE LOW BACK PAIN WITHOUT SCIATICA: ICD-10-CM

## 2023-12-18 DIAGNOSIS — R07.81 RIB PAIN ON RIGHT SIDE: ICD-10-CM

## 2023-12-18 DIAGNOSIS — I70.213 ATHEROSCLEROSIS OF NATIVE ARTERY OF BOTH LOWER EXTREMITIES WITH INTERMITTENT CLAUDICATION (HCC): ICD-10-CM

## 2023-12-18 DIAGNOSIS — I73.9 PERIPHERAL VASCULAR DISEASE (HCC): Primary | ICD-10-CM

## 2023-12-18 DIAGNOSIS — M62.838 MUSCLE SPASM: ICD-10-CM

## 2023-12-18 PROCEDURE — 3078F DIAST BP <80 MM HG: CPT | Performed by: FAMILY MEDICINE

## 2023-12-18 PROCEDURE — 3075F SYST BP GE 130 - 139MM HG: CPT | Performed by: FAMILY MEDICINE

## 2023-12-18 PROCEDURE — 99214 OFFICE O/P EST MOD 30 MIN: CPT | Performed by: FAMILY MEDICINE

## 2023-12-18 RX ORDER — TRAMADOL HYDROCHLORIDE 50 MG/1
50 TABLET ORAL 2 TIMES DAILY
Qty: 60 TABLET | Refills: 0 | Status: SHIPPED | OUTPATIENT
Start: 2023-12-18

## 2023-12-18 NOTE — PROGRESS NOTES
Assessment/Plan:    Atherosclerosis of native artery of extremity with intermittent claudication (HCC)  Continue statin  Continue Xarelto  Reviewed duplex results with patient  Reviewed ED indications  Try to obtain records from [place]  F/u with Vascular as scheduled        Diagnoses and all orders for this visit:    Peripheral vascular disease (HCC)    Rib pain on right side  -     traMADol (ULTRAM) 50 mg tablet; Take 1 tablet (50 mg total) by mouth 2 (two) times a day    Chronic midline low back pain without sciatica  -     traMADol (ULTRAM) 50 mg tablet; Take 1 tablet (50 mg total) by mouth 2 (two) times a day    Atherosclerosis of native artery of both lower extremities with intermittent claudication (HCC)          Subjective:      Patient ID: Jeovany Rawls is a 68 y.o. male.    69 yo  male here today to discuss results of lower limb arterial duplex  Continues to complain of pain with walking more than 5 to 10 minutes, which resolves after sitting/resting for 10 minutes  Currently denies pain at rest, however does endorse sporadic pain at times which wakes him up        The following portions of the patient's history were reviewed and updated as appropriate: He  has a past medical history of DVT (deep venous thrombosis) (HCC), Hypercholesterolemia, Hypertension, and Myocardial infarction (HCC).  He   Patient Active Problem List    Diagnosis Date Noted   • COVID 01/17/2022   • Obesity (BMI 30.0-34.9) 04/29/2020   • Smoker 04/29/2020   • Nasal congestion 11/04/2019   • Hypercholesterolemia 10/17/2018   • Mixed simple and mucopurulent chronic bronchitis (HCC) 10/17/2018   • Rib pain on right side 03/19/2018   • Abdominal adhesions 12/11/2017   • Benign essential hypertension 01/18/2017   • Aphagia 08/02/2016   • Choking sensation 08/02/2016   • Vitamin D deficiency 04/04/2016   • Heel spur 03/17/2016   • Arch pain, left 11/16/2015   • Intermittent claudication (HCC) 11/16/2015   • Limb swelling 11/16/2015    • Tinea pedis of both feet 11/16/2015   • Plantar fasciitis 11/04/2015   • Testicular discomfort 11/04/2015   • Low back pain 06/04/2015   • Erectile dysfunction of non-organic origin 07/17/2014   • Atherosclerosis of native artery of extremity with intermittent claudication ([facility]) 05/21/2014   • Peripheral vascular disease ([facility]) 05/21/2014   • Acute deep vein thrombosis (DVT) of both lower extremities ([facility]) 03/24/2014   • Pulmonary embolism with infarction ([facility]) 03/21/2013     He  has a past surgical history that includes Vascular surgery; pr colonoscopy flx dx w/collj spec when pfrmd (N/A, 05/17/2016); Anterior compartment decompression; Angioplasty; and Coronary artery bypass graft.  His family history includes Diabetes type II in his father and mother; Heart attack in his father and mother; Heart disease in his father and mother; Lung cancer in his father and mother; Other in his mother.  He  reports that he has been smoking. He has never used smokeless tobacco. He reports that he does not currently use alcohol. He reports current drug use. Frequency: 7.00 times per week. Drug: Marijuana.  Current Outpatient Medications   Medication Sig Dispense Refill   • Aspirin (ASPIR-81 PO) Take by mouth.     • Cholecalciferol (Vitamin D) 50 MCG (2000 UT) tablet Take 1 tablet (2,000 Units total) by mouth daily 90 tablet 3   • cilostazol (PLETAL) 100 mg tablet Take 100 mg by mouth 2 (two) times a day.     • cyclobenzaprine (FLEXERIL) 5 mg tablet TAKE ONE TABLET BY MOUTH TWICE A DAY AS NEEDED FOR MUSCLE SPASMS 60 tablet 0   • ergocalciferol (VITAMIN D2) 50,000 units Take 1 capsule (50,000 Units total) by mouth once a week 12 capsule 1   • fluticasone (FLONASE) 50 mcg/act nasal spray INHALE 1 SPRAY IN TO EACH NOSTRIL ONCE DAILY 16 g 3   • Fluticasone Furoate-Vilanterol (Breo Ellipta) 100-25 mcg/actuation inhaler Inhale 1 puff daily Rinse mouth after use 60 each 3   • lisinopril (ZESTRIL) 20 mg tablet Take 1 tablet by mouth  daily     • lisinopril (ZESTRIL) 20 mg tablet Take 1 tablet (20 mg total) by mouth daily 90 tablet 3   • loratadine (CLARITIN) 10 mg tablet Take 1 tablet (10 mg total) by mouth daily 90 tablet 1   • metoprolol tartrate (LOPRESSOR) 25 mg tablet TAKE ONE TABLET BY MOUTH EVERY 12 HOURS 90 tablet 2   • pravastatin (PRAVACHOL) 40 mg tablet Take 1 tablet (40 mg total) by mouth daily 90 tablet 3   • traMADol (ULTRAM) 50 mg tablet Take 1 tablet (50 mg total) by mouth 2 (two) times a day 60 tablet 0   • Ventolin  (90 Base) MCG/ACT inhaler INHALE 2 PUFFS BY MOUTH EVERY SIX HOURS AS NEEDED FOR WHEEZING 18 g 0   • Xarelto 20 MG tablet TAKE ONE TABLET BY MOUTH ONCE DAILY 90 tablet 12     No current facility-administered medications for this visit.     Current Outpatient Medications on File Prior to Visit   Medication Sig   • Aspirin (ASPIR-81 PO) Take by mouth.   • Cholecalciferol (Vitamin D) 50 MCG (2000 UT) tablet Take 1 tablet (2,000 Units total) by mouth daily   • cilostazol (PLETAL) 100 mg tablet Take 100 mg by mouth 2 (two) times a day.   • cyclobenzaprine (FLEXERIL) 5 mg tablet TAKE ONE TABLET BY MOUTH TWICE A DAY AS NEEDED FOR MUSCLE SPASMS   • ergocalciferol (VITAMIN D2) 50,000 units Take 1 capsule (50,000 Units total) by mouth once a week   • fluticasone (FLONASE) 50 mcg/act nasal spray INHALE 1 SPRAY IN TO EACH NOSTRIL ONCE DAILY   • Fluticasone Furoate-Vilanterol (Breo Ellipta) 100-25 mcg/actuation inhaler Inhale 1 puff daily Rinse mouth after use   • lisinopril (ZESTRIL) 20 mg tablet Take 1 tablet by mouth daily   • lisinopril (ZESTRIL) 20 mg tablet Take 1 tablet (20 mg total) by mouth daily   • loratadine (CLARITIN) 10 mg tablet Take 1 tablet (10 mg total) by mouth daily   • metoprolol tartrate (LOPRESSOR) 25 mg tablet TAKE ONE TABLET BY MOUTH EVERY 12 HOURS   • pravastatin (PRAVACHOL) 40 mg tablet Take 1 tablet (40 mg total) by mouth daily   • Ventolin  (90 Base) MCG/ACT inhaler INHALE 2 PUFFS BY  MOUTH EVERY SIX HOURS AS NEEDED FOR WHEEZING   • Xarelto 20 MG tablet TAKE ONE TABLET BY MOUTH ONCE DAILY   • [DISCONTINUED] meloxicam (MOBIC) 7.5 mg tablet Take 1 tablet by mouth 2 (two) times a day as needed   • [DISCONTINUED] traMADol (ULTRAM) 50 mg tablet Take 1 tablet (50 mg total) by mouth 2 (two) times a day     No current facility-administered medications on file prior to visit.     He is allergic to penicillins..    Review of Systems   Cardiovascular:  Positive for leg swelling.   Musculoskeletal:  Positive for arthralgias and back pain (chronic, unchanged).   All other systems reviewed and are negative.        Objective:      /60 (BP Location: Left arm, Patient Position: Sitting, Cuff Size: Standard)   Pulse 65   Temp (!) 97 °F (36.1 °C) (Temporal)   Wt 64.4 kg (141 lb 14.4 oz)   SpO2 98%   BMI 25.95 kg/m²          Physical Exam  Vitals and nursing note reviewed.   Constitutional:       Appearance: He is well-developed.   HENT:      Head: Normocephalic.      Right Ear: External ear normal.      Left Ear: External ear normal.      Nose: Nose normal.   Eyes:      Conjunctiva/sclera: Conjunctivae normal.      Pupils: Pupils are equal, round, and reactive to light.   Neck:      Thyroid: No thyromegaly.   Cardiovascular:      Rate and Rhythm: Normal rate and regular rhythm.      Heart sounds: Normal heart sounds.   Pulmonary:      Effort: Pulmonary effort is normal.      Breath sounds: Normal breath sounds.   Abdominal:      Palpations: Abdomen is soft.      Tenderness: There is no abdominal tenderness. There is no guarding or rebound.   Musculoskeletal:         General: Normal range of motion.      Cervical back: Normal range of motion and neck supple.   Skin:     General: Skin is dry.   Neurological:      Mental Status: He is alert and oriented to person, place, and time.      Deep Tendon Reflexes: Reflexes are normal and symmetric.

## 2023-12-18 NOTE — ASSESSMENT & PLAN NOTE
Continue statin  Continue Xarelto  Reviewed duplex results with patient  Reviewed ED indications  Try to obtain records from [place]  F/u with Vascular as scheduled

## 2023-12-19 RX ORDER — CYCLOBENZAPRINE HCL 5 MG
TABLET ORAL
Qty: 60 TABLET | Refills: 0 | Status: SHIPPED | OUTPATIENT
Start: 2023-12-19

## 2024-01-08 ENCOUNTER — CONSULT (OUTPATIENT)
Dept: VASCULAR SURGERY | Facility: CLINIC | Age: 69
End: 2024-01-08
Payer: MEDICARE

## 2024-01-08 VITALS
HEIGHT: 62 IN | OXYGEN SATURATION: 98 % | WEIGHT: 143.4 LBS | DIASTOLIC BLOOD PRESSURE: 70 MMHG | SYSTOLIC BLOOD PRESSURE: 140 MMHG | BODY MASS INDEX: 26.39 KG/M2 | HEART RATE: 60 BPM

## 2024-01-08 DIAGNOSIS — G47.62 NOCTURNAL LEG CRAMPS: ICD-10-CM

## 2024-01-08 DIAGNOSIS — I70.213 ATHEROSCLEROSIS OF NATIVE ARTERY OF BOTH LOWER EXTREMITIES WITH INTERMITTENT CLAUDICATION (HCC): Primary | ICD-10-CM

## 2024-01-08 DIAGNOSIS — I73.9 INTERMITTENT CLAUDICATION (HCC): ICD-10-CM

## 2024-01-08 PROCEDURE — 99205 OFFICE O/P NEW HI 60 MIN: CPT | Performed by: NURSE PRACTITIONER

## 2024-01-08 NOTE — PROGRESS NOTES
68-year-old Korean speaking male with PMHx Obesity, LLE DVT, PE with IVC filter, HTN, reported hx of B/L BPG from OSH is a new consults to the office for review of his LEAD.   Assessment/Plan:    Peripheral vascular disease (HCC)  Reviewed LEAD 12/1/23  Rt: tibio-peroneal disease; retrograde flow in the distal SAMI, suggestive of more  proximal disease. Stenosis vs. occlusion in a more distal segment.   TRISTON:   1.01 (Prior:  1.2)/  mm Hg/  mm Hg  (Prior: 115 mm Hg).     Lt: High-grade stenosis vs. occlusion of the distal SFA and popliteal arteries. High-grade stenosis vs. occlusion of the distal peroneal artery.  TRISTON: 0.49 (Prior: 0.46)/ MP 83 mm Hg/ GTP  47 mm Hg (Prior: 63 mm Hg).  Bilateral bypass grafts not visualized    -Claudication in R>L with 20-50 ft , frequent L leg pain/ cramping at night, no wounds/ tissue loss.   -Ambulates with cane to walk. Increase R knee pain d/t favoring R side.  -R fem pulse non-palpable, Doppler B/L DP and PT signals.    -Reviewed lower extremity ultrasound in detail with pt and family answered all questions. Educated pt on peripheral arterial disease and indication for vascular intervention. PT reports life limiting claudication in the LLE at this time and would like further evaluation for possible vascular intervention. We discussed due to his unknown medical history for b/l bypass grafts and hx of LLE DVT with faciotomy? Will order CTA with runoff to better visualize his anatomy and return for review with a vascular surgeon. Continue with medical management in the interim and call the office for any new or worsening symptoms. Pt verbalized understanding.    Recommendations:  -Complete blood work this week and then complete CTA with runoff and return to the office for review with a vascular surgeon.   -Call the office with any new or worsening leg pain, discoloration, swelling, new wounds/ tissue loss.  -Continue to take aspirin 81 mg daily.  -Continue to take  atorvastatin daily as per PCP.  -Do daily foot checks, food protection, wear well fitted shoes.  -Increase daily walking for 20-30 min everyday.       Diagnoses and all orders for this visit:    Atherosclerosis of native artery of both lower extremities with intermittent claudication (HCC)  -     CTA abdominal w run off w wo contrast; Future  -     Basic metabolic panel; Future  -     CBC; Future    Intermittent claudication (HCC)  -     Ambulatory Referral to Vascular Surgery  -     CTA abdominal w run off w wo contrast; Future  -     Basic metabolic panel; Future  -     CBC; Future    Nocturnal leg cramps  -     Ambulatory Referral to Vascular Surgery  -     CTA abdominal w run off w wo contrast; Future          Subjective:      Patient ID: Jeovany Rawls is a 68 y.o. male.    Patient presents for consult of lower extremities. His most recent testing was a MONSE done 12/1/2023. He reports intermittent cramping and pain behind the knee and calvesc when walking and at rest. Right is worse than left. He denies wounds. He is taking Pravastatin and Xarelto.     68-year-old Mongolian speaking male with PMHx Obesity, CAD s/p CABG,  PE with IVC filter, DVT, HTN, hx of b/l bpg from OSH is a new consults to the office for review of his LEAD.   He states that back in 2003 at a hospital in [facility] he had b/l bpg.     Presents to the office ambulating with cane with daughter in the office for translation. Complaining of B/L claudication at 20-50 feet before he has to stop d/t L>R calf cramping. He is able to stop and recover, then continue walking.   Favoring R leg due to long standing pain in the L leg. Now complaining of increasing pain in the R posterior and anterior knee. We discussed that R leg is likely compensating for the pain in the left leg and his new R leg pain is likely not due to vascular etiology.   Reports b/l legs being stiff in the morning but as he walks more this discomfort is relieved.   He wakes up  "from his sleep due to b/l leg cramping. L leg cramping almost every night with minimal relief from pletal medication.   He has b/l peripheral neuropathy that goes from his ankles up to his knees.   He reports compliance Xarelto and pletal medications. Not currently taking aspirin.          The following portions of the patient's history were reviewed and updated as appropriate: allergies, current medications, past family history, past medical history, past social history, past surgical history, and problem list.    Review of Systems   Constitutional: Negative.    HENT: Negative.     Eyes: Negative.    Respiratory: Negative.  Negative for shortness of breath.    Cardiovascular:  Positive for chest pain. Negative for leg swelling.   Gastrointestinal: Negative.    Endocrine: Negative.    Genitourinary: Negative.    Musculoskeletal:  Positive for gait problem (ambulates with a cane).   Skin: Negative.    Allergic/Immunologic: Negative.    Neurological:  Negative for dizziness and headaches.   Hematological: Negative.    Psychiatric/Behavioral: Negative.           Objective:      /70 (BP Location: Left arm, Patient Position: Sitting, Cuff Size: Standard)   Pulse 60   Ht 5' 2\" (1.575 m)   Wt 65 kg (143 lb 6.4 oz)   SpO2 98%   BMI 26.23 kg/m²          Physical Exam  Vitals and nursing note reviewed.   Constitutional:       Appearance: Normal appearance.   HENT:      Head: Normocephalic and atraumatic.   Neck:      Vascular: No carotid bruit.   Cardiovascular:      Rate and Rhythm: Normal rate.      Pulses:           Radial pulses are 2+ on the right side and 2+ on the left side.        Femoral pulses are 0 on the right side and 2+ on the left side.       Dorsalis pedis pulses are detected w/ Doppler on the right side and detected w/ Doppler on the left side.        Posterior tibial pulses are 2+ on the right side and detected w/ Doppler on the left side.      Heart sounds: Murmur heard.      Comments: Monophasic " "L DP and PT signals  Biphasic R DP signal  Pulmonary:      Effort: Pulmonary effort is normal. No respiratory distress.      Breath sounds: Normal breath sounds.   Abdominal:      Palpations: Abdomen is soft.   Musculoskeletal:         General: No tenderness.      Right lower leg: No edema.      Left lower leg: No edema.   Skin:     General: Skin is warm and dry.      Capillary Refill: Capillary refill takes 2 to 3 seconds.   Neurological:      General: No focal deficit present.      Mental Status: He is alert and oriented to person, place, and time.      Sensory: Sensory deficit present.      Gait: Gait abnormal (cane).   Psychiatric:         Mood and Affect: Mood normal.         Behavior: Behavior normal.         I have reviewed and made appropriate changes to the review of systems input by the medical assistant.    Vitals:    01/08/24 1004   BP: 140/70   BP Location: Left arm   Patient Position: Sitting   Cuff Size: Standard   Pulse: 60   SpO2: 98%   Weight: 65 kg (143 lb 6.4 oz)   Height: 5' 2\" (1.575 m)       Patient Active Problem List   Diagnosis    Abdominal adhesions    Acute deep vein thrombosis (DVT) of both lower extremities (HCC)    Aphagia    Arch pain, left    Benign essential hypertension    Atherosclerosis of native artery of extremity with intermittent claudication (HCC)    Choking sensation    Erectile dysfunction of non-organic origin    Heel spur    Intermittent claudication (HCC)    Limb swelling    Low back pain    Pulmonary embolism with infarction (HCC)    Peripheral vascular disease (HCC)    Plantar fasciitis    Testicular discomfort    Tinea pedis of both feet    Vitamin D deficiency    Rib pain on right side    Hypercholesterolemia    Mixed simple and mucopurulent chronic bronchitis (HCC)    Nasal congestion    Obesity (BMI 30.0-34.9)    Smoker    COVID       Past Surgical History:   Procedure Laterality Date    ANGIOPLASTY      orly lwr extr arter byp w/o throm w vein pacth angioplasty "    ANTERIOR COMPARTMENT DECOMPRESSION      leg    CORONARY ARTERY BYPASS GRAFT      MO COLONOSCOPY FLX DX W/COLLJ SPEC WHEN PFRMD N/A 05/17/2016    Procedure: COLONOSCOPY;  Surgeon: Benji Adair MD;  Location: [facility];  Service: Gastroenterology    VASCULAR SURGERY         Family History   Problem Relation Age of Onset    Other Mother         brain tumor    Lung cancer Mother     Heart attack Mother     Diabetes type II Mother     Heart disease Mother     Heart disease Father     Lung cancer Father     Heart attack Father     Diabetes type II Father        Social History     Socioeconomic History    Marital status:      Spouse name: Not on file    Number of children: Not on file    Years of education: Not on file    Highest education level: Not on file   Occupational History    Not on file   Tobacco Use    Smoking status: Every Day    Smokeless tobacco: Never   Vaping Use    Vaping status: Never Used   Substance and Sexual Activity    Alcohol use: Not Currently    Drug use: Yes     Frequency: 7.0 times per week     Types: Marijuana    Sexual activity: Not Currently     Partners: Female     Birth control/protection: None   Other Topics Concern    Not on file   Social History Narrative    No advance directives    [facility]     Social Determinants of Health     Financial Resource Strain: High Risk (9/28/2023)    Overall Financial Resource Strain (CARDIA)     Difficulty of Paying Living Expenses: Very hard   Food Insecurity: Food Insecurity Present (9/28/2023)    Hunger Vital Sign     Worried About Running Out of Food in the Last Year: Often true     Ran Out of Food in the Last Year: Often true   Transportation Needs: No Transportation Needs (9/28/2023)    PRAPARE - Transportation     Lack of Transportation (Medical): No     Lack of Transportation (Non-Medical): No   Physical Activity: Not on file   Stress: Not on file   Social Connections: Not on file   Intimate Partner Violence: Not on file   Housing  Stability: Not on file       Allergies   Allergen Reactions    Penicillins Rash         Current Outpatient Medications:     Cholecalciferol (Vitamin D) 50 MCG (2000 UT) tablet, Take 1 tablet (2,000 Units total) by mouth daily, Disp: 90 tablet, Rfl: 3    fluticasone (FLONASE) 50 mcg/act nasal spray, INHALE 1 SPRAY IN TO EACH NOSTRIL ONCE DAILY, Disp: 16 g, Rfl: 3    Fluticasone Furoate-Vilanterol (Breo Ellipta) 100-25 mcg/actuation inhaler, Inhale 1 puff daily Rinse mouth after use, Disp: 60 each, Rfl: 3    lisinopril (ZESTRIL) 20 mg tablet, Take 1 tablet (20 mg total) by mouth daily, Disp: 90 tablet, Rfl: 3    metoprolol tartrate (LOPRESSOR) 25 mg tablet, TAKE ONE TABLET BY MOUTH EVERY 12 HOURS, Disp: 90 tablet, Rfl: 2    pravastatin (PRAVACHOL) 40 mg tablet, Take 1 tablet (40 mg total) by mouth daily, Disp: 90 tablet, Rfl: 3    Ventolin  (90 Base) MCG/ACT inhaler, INHALE 2 PUFFS BY MOUTH EVERY SIX HOURS AS NEEDED FOR WHEEZING, Disp: 18 g, Rfl: 0    Xarelto 20 MG tablet, TAKE ONE TABLET BY MOUTH ONCE DAILY, Disp: 90 tablet, Rfl: 12    Aspirin (ASPIR-81 PO), Take by mouth. (Patient not taking: Reported on 1/8/2024), Disp: , Rfl:     cilostazol (PLETAL) 100 mg tablet, Take 100 mg by mouth 2 (two) times a day. (Patient not taking: Reported on 1/8/2024), Disp: , Rfl:     cyclobenzaprine (FLEXERIL) 5 mg tablet, TAKE ONE TABLET BY MOUTH TWICE A DAY AS NEEDED FOR MUSCLE SPASMS (Patient not taking: Reported on 1/8/2024), Disp: 60 tablet, Rfl: 0    ergocalciferol (VITAMIN D2) 50,000 units, Take 1 capsule (50,000 Units total) by mouth once a week (Patient not taking: Reported on 1/8/2024), Disp: 12 capsule, Rfl: 1    lisinopril (ZESTRIL) 20 mg tablet, Take 1 tablet by mouth daily (Patient not taking: Reported on 1/8/2024), Disp: , Rfl:     loratadine (CLARITIN) 10 mg tablet, Take 1 tablet (10 mg total) by mouth daily (Patient not taking: Reported on 1/8/2024), Disp: 90 tablet, Rfl: 1    traMADol (ULTRAM) 50 mg tablet,  Take 1 tablet (50 mg total) by mouth 2 (two) times a day (Patient not taking: Reported on 1/8/2024), Disp: 60 tablet, Rfl: 0  I have spent a total time of 40 minutes on 01/08/24 in caring for this patient including Diagnostic results, Risks and benefits of tx options, Instructions for management, Patient and family education, Importance of tx compliance, Risk factor reductions, Documenting in the medical record, Reviewing / ordering tests, medicine, procedures  , and Obtaining or reviewing history  .

## 2024-01-08 NOTE — ASSESSMENT & PLAN NOTE
Reviewed LEAD 12/1/23  Rt: tibio-peroneal disease; retrograde flow in the distal SAMI, suggestive of more  proximal disease. Stenosis vs. occlusion in a more distal segment.   TRISTON:   1.01 (Prior:  1.2)/  mm Hg/  mm Hg  (Prior: 115 mm Hg).     Lt: High-grade stenosis vs. occlusion of the distal SFA and popliteal arteries. High-grade stenosis vs. occlusion of the distal peroneal artery.  TRISTON: 0.49 (Prior: 0.46)/ MP 83 mm Hg/ GTP  47 mm Hg (Prior: 63 mm Hg).  Bilateral bypass grafts not visualized    -Claudication in R>L with 20-50 ft , frequent L leg pain/ cramping at night, no wounds/ tissue loss.   -Ambulates with cane to walk. Increase R knee pain d/t favoring R side.  -R fem pulse non-palpable, Doppler B/L DP and PT signals.    -Reviewed lower extremity ultrasound in detail with pt and family answered all questions. Educated pt on peripheral arterial disease and indication for vascular intervention. PT reports life limiting claudication in the LLE at this time and would like further evaluation for possible vascular intervention. We discussed due to his unknown medical history for b/l bypass grafts and hx of LLE DVT with faciotomy? Will order CTA with runoff to better visualize his anatomy and return for review with a vascular surgeon. Continue with medical management in the interim and call the office for any new or worsening symptoms. Pt verbalized understanding.    Recommendations:  -Complete blood work this week and then complete CTA with runoff and return to the office for review with a vascular surgeon.   -Call the office with any new or worsening leg pain, discoloration, swelling, new wounds/ tissue loss.  -Continue to take aspirin 81 mg daily.  -Continue to take atorvastatin daily as per PCP.  -Do daily foot checks, food protection, wear well fitted shoes.  -Increase daily walking for 20-30 min everyday.

## 2024-01-09 DIAGNOSIS — M62.838 MUSCLE SPASM: ICD-10-CM

## 2024-01-10 RX ORDER — CYCLOBENZAPRINE HCL 5 MG
TABLET ORAL
Qty: 60 TABLET | Refills: 0 | Status: SHIPPED | OUTPATIENT
Start: 2024-01-10

## 2024-01-11 ENCOUNTER — APPOINTMENT (OUTPATIENT)
Dept: LAB | Facility: HOSPITAL | Age: 69
End: 2024-01-11
Payer: MEDICARE

## 2024-01-11 DIAGNOSIS — I70.213 ATHEROSCLEROSIS OF NATIVE ARTERY OF BOTH LOWER EXTREMITIES WITH INTERMITTENT CLAUDICATION (HCC): ICD-10-CM

## 2024-01-11 DIAGNOSIS — I73.9 INTERMITTENT CLAUDICATION (HCC): ICD-10-CM

## 2024-01-11 LAB
ANION GAP SERPL CALCULATED.3IONS-SCNC: 2 MMOL/L
BUN SERPL-MCNC: 13 MG/DL (ref 5–25)
CALCIUM SERPL-MCNC: 8.9 MG/DL (ref 8.4–10.2)
CHLORIDE SERPL-SCNC: 103 MMOL/L (ref 96–108)
CO2 SERPL-SCNC: 30 MMOL/L (ref 21–32)
CREAT SERPL-MCNC: 0.97 MG/DL (ref 0.6–1.3)
ERYTHROCYTE [DISTWIDTH] IN BLOOD BY AUTOMATED COUNT: 13.2 % (ref 11.6–15.1)
GFR SERPL CREATININE-BSD FRML MDRD: 79 ML/MIN/1.73SQ M
GLUCOSE P FAST SERPL-MCNC: 78 MG/DL (ref 65–99)
HCT VFR BLD AUTO: 40.6 % (ref 36.5–49.3)
HGB BLD-MCNC: 13.2 G/DL (ref 12–17)
MCH RBC QN AUTO: 31 PG (ref 26.8–34.3)
MCHC RBC AUTO-ENTMCNC: 32.5 G/DL (ref 31.4–37.4)
MCV RBC AUTO: 95 FL (ref 82–98)
PLATELET # BLD AUTO: 231 THOUSANDS/UL (ref 149–390)
PMV BLD AUTO: 9.7 FL (ref 8.9–12.7)
POTASSIUM SERPL-SCNC: 4.3 MMOL/L (ref 3.5–5.3)
RBC # BLD AUTO: 4.26 MILLION/UL (ref 3.88–5.62)
SODIUM SERPL-SCNC: 135 MMOL/L (ref 135–147)
WBC # BLD AUTO: 9.02 THOUSAND/UL (ref 4.31–10.16)

## 2024-01-11 PROCEDURE — 85027 COMPLETE CBC AUTOMATED: CPT

## 2024-01-11 PROCEDURE — 36415 COLL VENOUS BLD VENIPUNCTURE: CPT

## 2024-01-11 PROCEDURE — 80048 BASIC METABOLIC PNL TOTAL CA: CPT

## 2024-01-18 ENCOUNTER — HOSPITAL ENCOUNTER (OUTPATIENT)
Dept: CT IMAGING | Facility: HOSPITAL | Age: 69
Discharge: HOME | End: 2024-01-18
Payer: MEDICARE

## 2024-01-18 DIAGNOSIS — I70.213 ATHEROSCLEROSIS OF NATIVE ARTERY OF BOTH LOWER EXTREMITIES WITH INTERMITTENT CLAUDICATION (HCC): ICD-10-CM

## 2024-01-18 DIAGNOSIS — I73.9 INTERMITTENT CLAUDICATION (HCC): ICD-10-CM

## 2024-01-18 DIAGNOSIS — G47.62 NOCTURNAL LEG CRAMPS: ICD-10-CM

## 2024-01-18 PROCEDURE — 75635 CT ANGIO ABDOMINAL ARTERIES: CPT

## 2024-01-18 PROCEDURE — G1004 CDSM NDSC: HCPCS

## 2024-01-18 RX ADMIN — IOHEXOL 140 ML: 350 INJECTION, SOLUTION INTRAVENOUS at 14:42

## 2024-02-19 DIAGNOSIS — M62.838 MUSCLE SPASM: ICD-10-CM

## 2024-02-20 RX ORDER — CYCLOBENZAPRINE HCL 5 MG
TABLET ORAL
Qty: 60 TABLET | Refills: 0 | Status: SHIPPED | OUTPATIENT
Start: 2024-02-20

## 2024-02-23 ENCOUNTER — OFFICE VISIT (OUTPATIENT)
Dept: VASCULAR SURGERY | Facility: CLINIC | Age: 69
End: 2024-02-23
Payer: MEDICARE

## 2024-02-23 VITALS
OXYGEN SATURATION: 99 % | WEIGHT: 148 LBS | DIASTOLIC BLOOD PRESSURE: 58 MMHG | SYSTOLIC BLOOD PRESSURE: 132 MMHG | HEART RATE: 64 BPM | HEIGHT: 62 IN | BODY MASS INDEX: 27.23 KG/M2

## 2024-02-23 DIAGNOSIS — F17.200 SMOKER: ICD-10-CM

## 2024-02-23 DIAGNOSIS — I70.212 ATHEROSCLEROSIS OF NATIVE ARTERY OF LEFT LOWER EXTREMITY WITH INTERMITTENT CLAUDICATION (HCC): ICD-10-CM

## 2024-02-23 DIAGNOSIS — M79.89 LIMB SWELLING: ICD-10-CM

## 2024-02-23 DIAGNOSIS — I73.9 PAD (PERIPHERAL ARTERY DISEASE) (HCC): Primary | ICD-10-CM

## 2024-02-23 PROCEDURE — 99214 OFFICE O/P EST MOD 30 MIN: CPT | Performed by: SURGERY

## 2024-02-23 NOTE — ASSESSMENT & PLAN NOTE
Patient returns to the office to review CTA of the abdomen pelvis with runoff.  He does appear to have history of acute limb ischemia of the left lower extremity many years ago.  He underwent revascularization with fasciotomies of his left lower leg in [place] greater than 20 years ago.  Despite patient reporting bilateral lower extremity bypasses there is no evidence of bypass on CT imaging.  Of note he does have an IVC filter in place.  He did have a left lower extremity arteriogram here within the [facility] back in 2010 demonstrating the distal SFA occlusion extending down through the popliteal artery with reconstitution of his peroneal and anterior tibial artery.  He denies any rest pain/tissue loss.  He does have disabling claudication.  His prior duplex had suggested left TRISTON of 0.49.  Had a lengthy discussion with patient and his daughter in the office.  Given his history of fasciotomy and prior surgery many years ago he likely did sustain some underlying neuropathic injury to the left lower leg/foot.  In addition due to the prior surgery and in the absence of signs or symptoms suggestive of critical limb ischemia (tissue loss/rest pain) do not favor proceeding with any revascularization at this time which would entail a SFA to anterior tibial bypass.  Having said that should patient develop any tissue loss and/or rest pain he has been instructed to notify the office immediately so he can be seen and reassessed.  Otherwise we will continue surveillance with a 6-month duplex and sooner if indicated.

## 2024-02-23 NOTE — PROGRESS NOTES
Assessment/Plan:    No problem-specific Assessment & Plan notes found for this encounter.       Diagnoses and all orders for this visit:    PAD (peripheral artery disease) (HCC)  -     VAS ARTERIAL DUPLEX- LOWER LIMB BILATERAL; Future    Atherosclerosis of native artery of left lower extremity with intermittent claudication (HCC)    Smoker    Limb swelling          Subjective:      Patient ID: Jeovany Rawls is a 68 y.o. male.    Patient is here today to review results of a CTA ABD with run off w/wo contrast done 1/18/2024 and a MONSE done 12/1/2023. Patient is s/p B/L LE arterial bypass grafts done at a [place] facility in 2003. Patient c/o pain in his both legs from hips to feet. He denies any open wounds. His left leg is worse than the right leg. Patient is taking ASA 81 mg, Xarelto, Pletal and Pravastatin. Patient is a current smoker.     Jeovany presents to the office accompanied by his daughter to review CTA of the abdomen pelvis with lower extremity runoff.          The following portions of the patient's history were reviewed and updated as appropriate: allergies, current medications, past family history, past medical history, past social history, past surgical history, and problem list.    Review of Systems   Constitutional: Negative.    HENT: Negative.     Eyes: Negative.    Respiratory: Negative.     Cardiovascular: Negative.    Gastrointestinal: Negative.    Endocrine: Negative.    Genitourinary: Negative.    Musculoskeletal:         Pain in legs   Skin: Negative.    Allergic/Immunologic: Negative.    Neurological: Negative.    Hematological: Negative.    Psychiatric/Behavioral: Negative.           Objective:      There were no vitals taken for this visit.         Physical Exam  Constitutional:       General: He is not in acute distress.     Appearance: He is well-developed.   HENT:      Head: Normocephalic and atraumatic.   Eyes:      General: No scleral icterus.     Conjunctiva/sclera: Conjunctivae normal.    Neck:      Trachea: No tracheal deviation.   Cardiovascular:      Rate and Rhythm: Normal rate and regular rhythm.      Pulses:           Dorsalis pedis pulses are detected w/ Doppler on the left side.      Heart sounds: Normal heart sounds.   Pulmonary:      Effort: Pulmonary effort is normal.      Breath sounds: Normal breath sounds.   Abdominal:      General: There is no distension.      Palpations: Abdomen is soft. There is no mass (no appreciable aortic pulsation/aneurysm).      Tenderness: There is no abdominal tenderness. There is no guarding or rebound.   Musculoskeletal:         General: Normal range of motion.      Cervical back: Normal range of motion and neck supple.   Skin:     General: Skin is warm and dry.      Comments: Evidence of chronic left lower leg fasciotomies with skin grafting noted.     Neurological:      Mental Status: He is alert and oriented to person, place, and time.   Psychiatric:         Behavior: Behavior normal.           Lower extremity arterial duplex:  CONCLUSION:  Impression:  RIGHT LOWER LIMB:  This resting evaluation shows evidence of tibio-peroneal disease; there is  retrograde flow in the distal anterior tibial artery, suggestive of more  proximal disease. There is also evidence of high-grade stenosis vs. occlusion in  a more distal segment. There is dampened flow noted in the distal peroneal  artery.  No evidence of significant lower extremity arterial occlusive disease in the  femoral-popliteal arteries.  Ankle/Brachial index:   1.01, which is in the normal category (Prior:  1.2).  PVR/ PPG tracings are dampened.  Metatarsal pressure of 169 mm Hg.  Great toe pressure of 118 mm Hg, within the healing range for a non-diabetic  (Prior: 115 mm Hg).     LEFT LOWER LIMB:  This resting evaluation shows evidence of high-grade stenosis vs. occlusion of  the distal superficial femoral and popliteal arteries. There is evidence of  high-grade stenosis vs. occlusion of the distal  peroneal artery.  Ankle/Brachial index: 0.49, which is in the ischemic disease category (Prior:  0.46).  PVR/ PPG tracings are dampened.  Metatarsal pressure of 83 mm Hg.  Great toe pressure of 47 mm Hg, within the healing range for a non-diabetic  (Prior: 63 mm Hg).     *Tech note: Bilateral bypass grafts not visualized.*     Compared to previous study on 12/4/15, there is new disease in the right SAMI,  left distal POP A and left distal PERON A. Unable to reproduce 50-75% stenosis  in the left proximal/mid SFA.

## 2024-03-11 DIAGNOSIS — M62.838 MUSCLE SPASM: ICD-10-CM

## 2024-03-12 ENCOUNTER — TELEPHONE (OUTPATIENT)
Dept: FAMILY MEDICINE CLINIC | Facility: CLINIC | Age: 69
End: 2024-03-12

## 2024-03-12 RX ORDER — CYCLOBENZAPRINE HCL 5 MG
TABLET ORAL
Qty: 60 TABLET | Refills: 0 | Status: SHIPPED | OUTPATIENT
Start: 2024-03-12

## 2024-03-12 NOTE — TELEPHONE ENCOUNTER
Patient came into office and is requesting a letter for Home Health Care and is requesting the letter to state he needs 16 more hours of care and to get more care on the weekends due to his medial conditions.         Any questions please contact patient, thank you !

## 2024-03-14 NOTE — TELEPHONE ENCOUNTER
Please let patient know we, as providers do not establish the amount of hours of Health Care he is given. I can write a letter stating he would benefit from more hourss but need to know why he feels he needs more hours of HH

## 2024-03-18 NOTE — TELEPHONE ENCOUNTER
Letter is written, but again please remind patient providers do not establish and can not determine the amount of hours a patient receives of HH. Any further questions or needs, patient will need to discuss at an appointment. TY

## 2024-03-18 NOTE — TELEPHONE ENCOUNTER
Patient stated he needs help with getting up in the morning, also stated he needs help with walking, pt would like help with all daily living activities, cooking, cleaning and needs help with getting ready . Any questions please contact patient, thank you

## 2024-05-23 ENCOUNTER — OFFICE VISIT (OUTPATIENT)
Dept: FAMILY MEDICINE CLINIC | Facility: CLINIC | Age: 69
End: 2024-05-23
Payer: MEDICARE

## 2024-05-23 ENCOUNTER — APPOINTMENT (OUTPATIENT)
Dept: LAB | Facility: CLINIC | Age: 69
End: 2024-05-23
Payer: MEDICARE

## 2024-05-23 VITALS
HEART RATE: 76 BPM | HEIGHT: 62 IN | OXYGEN SATURATION: 97 % | DIASTOLIC BLOOD PRESSURE: 60 MMHG | BODY MASS INDEX: 25.62 KG/M2 | WEIGHT: 139.2 LBS | SYSTOLIC BLOOD PRESSURE: 128 MMHG

## 2024-05-23 DIAGNOSIS — Z13.0 SCREENING FOR DEFICIENCY ANEMIA: ICD-10-CM

## 2024-05-23 DIAGNOSIS — E61.1 IRON DEFICIENCY: ICD-10-CM

## 2024-05-23 DIAGNOSIS — J41.8 MIXED SIMPLE AND MUCOPURULENT CHRONIC BRONCHITIS (HCC): ICD-10-CM

## 2024-05-23 DIAGNOSIS — I73.9 INTERMITTENT CLAUDICATION (HCC): ICD-10-CM

## 2024-05-23 DIAGNOSIS — Z13.1 SCREENING FOR DIABETES MELLITUS: ICD-10-CM

## 2024-05-23 DIAGNOSIS — I26.99 PULMONARY EMBOLISM WITH INFARCTION (HCC): ICD-10-CM

## 2024-05-23 DIAGNOSIS — I73.9 PERIPHERAL VASCULAR DISEASE (HCC): ICD-10-CM

## 2024-05-23 DIAGNOSIS — Z13.220 SCREENING FOR LIPID DISORDERS: ICD-10-CM

## 2024-05-23 DIAGNOSIS — S46.012A TRAUMATIC TEAR OF LEFT ROTATOR CUFF, UNSPECIFIED TEAR EXTENT, INITIAL ENCOUNTER: ICD-10-CM

## 2024-05-23 DIAGNOSIS — E55.9 VITAMIN D DEFICIENCY: ICD-10-CM

## 2024-05-23 DIAGNOSIS — R07.89 OTHER CHEST PAIN: Primary | ICD-10-CM

## 2024-05-23 DIAGNOSIS — R09.89 BRUIT OF LEFT CAROTID ARTERY: ICD-10-CM

## 2024-05-23 DIAGNOSIS — R07.89 OTHER CHEST PAIN: ICD-10-CM

## 2024-05-23 DIAGNOSIS — E78.00 HYPERCHOLESTEROLEMIA: ICD-10-CM

## 2024-05-23 DIAGNOSIS — I10 BENIGN ESSENTIAL HYPERTENSION: ICD-10-CM

## 2024-05-23 DIAGNOSIS — I70.212 ATHEROSCLEROSIS OF NATIVE ARTERY OF LEFT LOWER EXTREMITY WITH INTERMITTENT CLAUDICATION (HCC): ICD-10-CM

## 2024-05-23 DIAGNOSIS — I82.413 ACUTE DEEP VEIN THROMBOSIS (DVT) OF FEMORAL VEIN OF BOTH LOWER EXTREMITIES (HCC): ICD-10-CM

## 2024-05-23 DIAGNOSIS — R01.1 MURMUR, CARDIAC: ICD-10-CM

## 2024-05-23 DIAGNOSIS — Z12.5 SCREENING PSA (PROSTATE SPECIFIC ANTIGEN): ICD-10-CM

## 2024-05-23 PROBLEM — U07.1 COVID: Status: RESOLVED | Noted: 2022-01-17 | Resolved: 2024-05-23

## 2024-05-23 PROBLEM — R09.81 NASAL CONGESTION: Status: RESOLVED | Noted: 2019-11-04 | Resolved: 2024-05-23

## 2024-05-23 LAB
25(OH)D3 SERPL-MCNC: 38.4 NG/ML (ref 30–100)
ALBUMIN SERPL BCP-MCNC: 3.9 G/DL (ref 3.5–5)
ALP SERPL-CCNC: 61 U/L (ref 34–104)
ALT SERPL W P-5'-P-CCNC: 21 U/L (ref 7–52)
ANION GAP SERPL CALCULATED.3IONS-SCNC: 10 MMOL/L (ref 4–13)
AST SERPL W P-5'-P-CCNC: 32 U/L (ref 13–39)
B BURGDOR IGG+IGM SER QL IA: NEGATIVE
BACTERIA UR QL AUTO: NORMAL /HPF
BASOPHILS # BLD AUTO: 0.05 THOUSANDS/ÂΜL (ref 0–0.1)
BASOPHILS NFR BLD AUTO: 1 % (ref 0–1)
BILIRUB SERPL-MCNC: 0.56 MG/DL (ref 0.2–1)
BILIRUB UR QL STRIP: NEGATIVE
BUN SERPL-MCNC: 13 MG/DL (ref 5–25)
CALCIUM SERPL-MCNC: 9 MG/DL (ref 8.4–10.2)
CHLORIDE SERPL-SCNC: 101 MMOL/L (ref 96–108)
CHOLEST SERPL-MCNC: 164 MG/DL
CLARITY UR: CLEAR
CO2 SERPL-SCNC: 26 MMOL/L (ref 21–32)
COLOR UR: YELLOW
CREAT SERPL-MCNC: 1.22 MG/DL (ref 0.6–1.3)
CRP SERPL QL: 8.6 MG/L
EOSINOPHIL # BLD AUTO: 0.15 THOUSAND/ÂΜL (ref 0–0.61)
EOSINOPHIL NFR BLD AUTO: 2 % (ref 0–6)
ERYTHROCYTE [DISTWIDTH] IN BLOOD BY AUTOMATED COUNT: 13.1 % (ref 11.6–15.1)
ERYTHROCYTE [SEDIMENTATION RATE] IN BLOOD: 72 MM/HOUR (ref 0–19)
EST. AVERAGE GLUCOSE BLD GHB EST-MCNC: 123 MG/DL
FERRITIN SERPL-MCNC: 237 NG/ML (ref 24–336)
GFR SERPL CREATININE-BSD FRML MDRD: 60 ML/MIN/1.73SQ M
GLUCOSE P FAST SERPL-MCNC: 86 MG/DL (ref 65–99)
GLUCOSE UR STRIP-MCNC: NEGATIVE MG/DL
HBA1C MFR BLD: 5.9 %
HCT VFR BLD AUTO: 42.9 % (ref 36.5–49.3)
HDLC SERPL-MCNC: 46 MG/DL
HGB BLD-MCNC: 14.1 G/DL (ref 12–17)
HGB UR QL STRIP.AUTO: NEGATIVE
IMM GRANULOCYTES # BLD AUTO: 0.02 THOUSAND/UL (ref 0–0.2)
IMM GRANULOCYTES NFR BLD AUTO: 0 % (ref 0–2)
IRON SATN MFR SERPL: 14 % (ref 15–50)
IRON SERPL-MCNC: 45 UG/DL (ref 50–212)
KETONES UR STRIP-MCNC: NEGATIVE MG/DL
LDLC SERPL CALC-MCNC: 88 MG/DL (ref 0–100)
LEUKOCYTE ESTERASE UR QL STRIP: NEGATIVE
LYMPHOCYTES # BLD AUTO: 3.6 THOUSANDS/ÂΜL (ref 0.6–4.47)
LYMPHOCYTES NFR BLD AUTO: 55 % (ref 14–44)
MCH RBC QN AUTO: 30.9 PG (ref 26.8–34.3)
MCHC RBC AUTO-ENTMCNC: 32.9 G/DL (ref 31.4–37.4)
MCV RBC AUTO: 94 FL (ref 82–98)
MONOCYTES # BLD AUTO: 0.94 THOUSAND/ÂΜL (ref 0.17–1.22)
MONOCYTES NFR BLD AUTO: 14 % (ref 4–12)
NEUTROPHILS # BLD AUTO: 1.88 THOUSANDS/ÂΜL (ref 1.85–7.62)
NEUTS SEG NFR BLD AUTO: 28 % (ref 43–75)
NITRITE UR QL STRIP: NEGATIVE
NON-SQ EPI CELLS URNS QL MICRO: NORMAL /HPF
NONHDLC SERPL-MCNC: 118 MG/DL
NRBC BLD AUTO-RTO: 0 /100 WBCS
PH UR STRIP.AUTO: 6 [PH]
PLATELET # BLD AUTO: 242 THOUSANDS/UL (ref 149–390)
PMV BLD AUTO: 10.8 FL (ref 8.9–12.7)
POTASSIUM SERPL-SCNC: 4.7 MMOL/L (ref 3.5–5.3)
PROT SERPL-MCNC: 7.9 G/DL (ref 6.4–8.4)
PROT UR STRIP-MCNC: ABNORMAL MG/DL
PSA SERPL-MCNC: 0.29 NG/ML (ref 0–4)
RBC # BLD AUTO: 4.56 MILLION/UL (ref 3.88–5.62)
RBC #/AREA URNS AUTO: NORMAL /HPF
SODIUM SERPL-SCNC: 137 MMOL/L (ref 135–147)
SP GR UR STRIP.AUTO: 1.01 (ref 1–1.03)
TIBC SERPL-MCNC: 327 UG/DL (ref 250–450)
TRIGL SERPL-MCNC: 149 MG/DL
UIBC SERPL-MCNC: 282 UG/DL (ref 155–355)
UROBILINOGEN UR STRIP-ACNC: <2 MG/DL
WBC # BLD AUTO: 6.64 THOUSAND/UL (ref 4.31–10.16)
WBC #/AREA URNS AUTO: NORMAL /HPF

## 2024-05-23 PROCEDURE — 86618 LYME DISEASE ANTIBODY: CPT

## 2024-05-23 PROCEDURE — 82306 VITAMIN D 25 HYDROXY: CPT

## 2024-05-23 PROCEDURE — G0103 PSA SCREENING: HCPCS

## 2024-05-23 PROCEDURE — 36415 COLL VENOUS BLD VENIPUNCTURE: CPT

## 2024-05-23 PROCEDURE — 80061 LIPID PANEL: CPT

## 2024-05-23 PROCEDURE — 83550 IRON BINDING TEST: CPT

## 2024-05-23 PROCEDURE — 81001 URINALYSIS AUTO W/SCOPE: CPT

## 2024-05-23 PROCEDURE — 83036 HEMOGLOBIN GLYCOSYLATED A1C: CPT

## 2024-05-23 PROCEDURE — 85652 RBC SED RATE AUTOMATED: CPT

## 2024-05-23 PROCEDURE — 82728 ASSAY OF FERRITIN: CPT

## 2024-05-23 PROCEDURE — 85025 COMPLETE CBC W/AUTO DIFF WBC: CPT

## 2024-05-23 PROCEDURE — 80053 COMPREHEN METABOLIC PANEL: CPT

## 2024-05-23 PROCEDURE — 99205 OFFICE O/P NEW HI 60 MIN: CPT | Performed by: NURSE PRACTITIONER

## 2024-05-23 PROCEDURE — 83540 ASSAY OF IRON: CPT

## 2024-05-23 PROCEDURE — 93000 ELECTROCARDIOGRAM COMPLETE: CPT | Performed by: NURSE PRACTITIONER

## 2024-05-23 PROCEDURE — 86140 C-REACTIVE PROTEIN: CPT

## 2024-05-23 RX ORDER — CYCLOBENZAPRINE HCL 10 MG
10 TABLET ORAL 3 TIMES DAILY PRN
Qty: 30 TABLET | Refills: 0 | Status: SHIPPED | OUTPATIENT
Start: 2024-05-23

## 2024-05-23 NOTE — ASSESSMENT & PLAN NOTE
Nuclear stress echo was ordered to assess for any acute cardiac abnormalities which could explain the patient's continued chest pain.  Multiple labs were also ordered to assess for causes of patient's recurrent chest pain and dyspnea on exertion.  Patient was advised that if he begins to note continuous chest pain he must be evaluated in the ED immediately.

## 2024-05-23 NOTE — ASSESSMENT & PLAN NOTE
Lipid panel was ordered to be completed prior to the patient's next office visit.  Patient was advised to continue to limit fatty and fried foods in his diet.

## 2024-05-23 NOTE — ASSESSMENT & PLAN NOTE
Patient is having continued claudication symptoms.  He is currently on the highest dosage of Pletal so I did increase his Flexeril dosage to 10 mg 3 times daily as needed.  He was also advised that he can use up to 3000 mg of Tylenol in a 24-hour period as needed for his pain.  If these interventions still do not control his pain we may need to discuss use of an opioid medication such as tramadol as anti-inflammatories are not an option for him due to him being chronically managed on Xarelto.

## 2024-05-23 NOTE — ASSESSMENT & PLAN NOTE
Patient does have symptoms consistent with possible left rotator cuff tear.  MRI of the left shoulder was ordered to confirm this.  Patient is not interested in seeing PT at this time.  Depending on the results the patient may be referred to an orthopedic specialist for further management.  In the meantime, the patient was advised to use Flexeril and Tylenol as needed for his pain.

## 2024-05-23 NOTE — ASSESSMENT & PLAN NOTE
Nuclear stress echo was ordered to further evaluate this.  Carotid Dopplers were also ordered to assess for any signs of carotid artery stenosis.

## 2024-05-23 NOTE — ASSESSMENT & PLAN NOTE
Patient is having continued claudication symptoms.  He is currently on the highest dosage of Pletal so I did increase his Flexeril dosage to 10 mg 3 times daily as needed.  He was also advised that he can use up to 3000 mg of Tylenol in a 24-hour period as needed for his pain.  If these interventions still do not control his pain we may need to discuss use of an opioid medication such as tramadol as anti-inflammatories are not an option for him due to him being chronically managed on Xarelto.  Patient continues to follow with vascular surgery regarding this.

## 2024-05-23 NOTE — PROGRESS NOTES
Ambulatory Visit  Name: Jeovany Rawls      : 1955      MRN: [id number]  Encounter Provider: LINCOLN Brasher  Encounter Date: 2024   Encounter department: [facility]    Assessment & Plan   1. Other chest pain  Assessment & Plan:  Nuclear stress echo was ordered to assess for any acute cardiac abnormalities which could explain the patient's continued chest pain.  Multiple labs were also ordered to assess for causes of patient's recurrent chest pain and dyspnea on exertion.  Patient was advised that if he begins to note continuous chest pain he must be evaluated in the ED immediately.  Orders:  -     POCT ECG  -     Iron Panel (Includes Ferritin, Iron Sat%, Iron, and TIBC); Future  -     C-reactive protein; Future  -     Sedimentation rate, automated; Future  -     Lyme Total AB W Reflex to IGM/IGG; Future  -     Echo stress test, dobutamine; Future; Expected date: 2024  2. Pulmonary embolism with infarction (HCC)  Assessment & Plan:  Well-controlled on current regimen.    3. Mixed simple and mucopurulent chronic bronchitis (HCC)  Assessment & Plan:  Well-controlled on current regimen.    4. Screening for deficiency anemia  -     CBC and differential; Future  5. Screening for diabetes mellitus  -     Comprehensive metabolic panel; Future  -     UA w Reflex to Microscopic w Reflex to Culture; Future  -     Hemoglobin A1C; Future  6. Screening for lipid disorders  -     Lipid panel; Future  7. Screening PSA (prostate specific antigen)  -     PSA, Total Screen; Future  8. Vitamin D deficiency  Assessment & Plan:  Repeat vitamin D level was ordered to be completed prior to the patient's next office visit.  Orders:  -     Vitamin D 25 hydroxy; Future  9. Murmur, cardiac  Assessment & Plan:  Nuclear stress echo was ordered to further evaluate this.  Carotid Dopplers were also ordered to assess for any signs of carotid artery stenosis.  Orders:  -     VAS carotid complete  study; Future; Expected date: 05/23/2024  10. Traumatic tear of left rotator cuff, unspecified tear extent, initial encounter  Assessment & Plan:  Patient does have symptoms consistent with possible left rotator cuff tear.  MRI of the left shoulder was ordered to confirm this.  Patient is not interested in seeing PT at this time.  Depending on the results the patient may be referred to an orthopedic specialist for further management.  In the meantime, the patient was advised to use Flexeril and Tylenol as needed for his pain.  Orders:  -     cyclobenzaprine (FLEXERIL) 10 mg tablet; Take 1 tablet (10 mg total) by mouth 3 (three) times a day as needed for muscle spasms  -     MRI shoulder left wo contrast; Future; Expected date: 05/23/2024  11. Bruit of left carotid artery  -     VAS carotid complete study; Future; Expected date: 05/23/2024  12. Iron deficiency  -     Iron Panel (Includes Ferritin, Iron Sat%, Iron, and TIBC); Future  13. Peripheral vascular disease (HCC)  Assessment & Plan:  Patient is having continued claudication symptoms.  He is currently on the highest dosage of Pletal so I did increase his Flexeril dosage to 10 mg 3 times daily as needed.  He was also advised that he can use up to 3000 mg of Tylenol in a 24-hour period as needed for his pain.  If these interventions still do not control his pain we may need to discuss use of an opioid medication such as tramadol as anti-inflammatories are not an option for him due to him being chronically managed on Xarelto.  14. Benign essential hypertension  Assessment & Plan:  Well-controlled on current regimen.    15. Atherosclerosis of native artery of left lower extremity with intermittent claudication (HCC)  Assessment & Plan:  Patient is having continued claudication symptoms.  He is currently on the highest dosage of Pletal so I did increase his Flexeril dosage to 10 mg 3 times daily as needed.  He was also advised that he can use up to 3000 mg of  Tylenol in a 24-hour period as needed for his pain.  If these interventions still do not control his pain we may need to discuss use of an opioid medication such as tramadol as anti-inflammatories are not an option for him due to him being chronically managed on Xarelto.  Patient continues to follow with vascular surgery regarding this.  16. Acute deep vein thrombosis (DVT) of femoral vein of both lower extremities (HCC)  Assessment & Plan:  Well-controlled on current regimen.    17. Intermittent claudication ([facility])  Assessment & Plan:  Patient is having continued claudication symptoms.  He is currently on the highest dosage of Pletal so I did increase his Flexeril dosage to 10 mg 3 times daily as needed.  He was also advised that he can use up to 3000 mg of Tylenol in a 24-hour period as needed for his pain.  If these interventions still do not control his pain we may need to discuss use of an opioid medication such as tramadol as anti-inflammatories are not an option for him due to him being chronically managed on Xarelto.  18. Hypercholesterolemia  Assessment & Plan:  Lipid panel was ordered to be completed prior to the patient's next office visit.  Patient was advised to continue to limit fatty and fried foods in his diet.  Orders:  -     Lipid panel; Future      Falls Plan of Care: balance, strength, and gait training instructions were provided. Home safety education provided.     Tobacco Cessation Counseling: Tobacco cessation counseling was provided. The patient is sincerely urged to quit consumption of tobacco. He is not ready to quit tobacco.       History of Present Illness   {Disappearing Hyperlinks I Encounters * My Last Note * Since Last Visit * History :09233}  PE: Patient is anticoagulated with Xarelto and baby aspirin.  Patient denies any new respiratory symptoms.    PVD/atherosclerosis of extremity with intermittent claudication: Patient is following with vascular surgery regarding this and he  recently had a CTA of the abdomen with runoff completed which showed occlusion of the left popliteal artery beginning at the adductor canal and reconstituting at the tibioperoneal trunk and abrupt occlusion of the left posterior tibial artery at the level of the ankle, with minimal reconstitution of calcaneal collaterals. Moderate atherosclerotic narrowing of the right common iliac artery was also noted.  Patient is current managed on Pletal 100 mg twice daily for claudication symptoms.  Of note, the patient does have a history of bilateral lower extremity bypass grafts completed in 2003.  Patient does have an upcoming follow-up arterial duplex study scheduled.  The patient does report that he does continue to note claudication symptoms which make it difficult for him to ambulate.  He reports that he is generally only able to ambulate a short distance before noting the claudication and needing to rest.    Hypertension: Well-controlled on current dosages of lisinopril and Lopressor.  Patient denies lightheadedness, dizziness, or orthostasis.    DVT of BLE: Patient is currently anticoagulated with Xarelto and baby aspirin.  Patient denies any symptoms of recurrence of DVT.    Chronic bronchitis: Well-controlled with daily use of Breo and as needed use of Ventolin inhaler.  Patient reports rarely needing to use Ventolin.    Hypercholesterolemia: Patient's most recent lipid panel was normal.  Patient is currently managed on pravastatin 40 mg daily.    Vitamin D deficiency: Patient's most recent vitamin D level was slightly low.  Patient is currently taking a daily vitamin D supplement.    Left back/shoulder pain: The patient reports that he has been noting left posterior shoulder/left upper back pain over the past few weeks.  He reports that it is very difficult for him to attempt to reach towards his back.  He denies any limitation of his ability to raise his arm above his head or to his left side.  He does report that  the pain has seemed to worsen recently.    Chest pain: Patient reports that over the past few weeks he has been experiencing recurrent left-sided chest pain.  He does report that he notes the pain when performing activity.  He also reports associated dyspnea on exertion when experiencing the chest pain.  He does also note that the pain in his left shoulder seems to intensify when the chest pain is present as well.  Patient also reports that he has been feeling more fatigued recently.  Patient reports that he feels that he may have had a stent placed in the past but he is unsure as this was many years ago.  No record of this is currently available in epic.  EKG completed in the office today showed normal sinus rhythm.            Review of Systems   Constitutional:  Positive for fatigue. Negative for chills and fever.   HENT:  Negative for ear pain and sore throat.    Eyes:  Negative for pain and visual disturbance.   Respiratory:  Positive for chest tightness and shortness of breath (associated with chest pain). Negative for cough and wheezing.    Cardiovascular:  Positive for chest pain (left sided-recurrent). Negative for palpitations and leg swelling.   Gastrointestinal:  Negative for abdominal pain, constipation, diarrhea, nausea and vomiting.   Endocrine: Negative for cold intolerance and heat intolerance.   Genitourinary:  Negative for decreased urine volume, dysuria and hematuria.   Musculoskeletal:  Positive for arthralgias (left shoulder) and back pain (left upper). Negative for myalgias.   Skin:  Negative for color change and rash.   Allergic/Immunologic: Negative for environmental allergies.   Neurological:  Negative for dizziness, seizures, syncope, weakness, light-headedness, numbness and headaches.   Hematological:  Negative for adenopathy.   Psychiatric/Behavioral:  Negative for confusion. The patient is not nervous/anxious.    All other systems reviewed and are negative.      Objective   {Disappearing  "Hyperlinks   Review Vitals * Enter New Vitals * Results Review * Labs * Imaging * Cardiology * Procedures * Lung Cancer Screening :34736}  /60 (BP Location: Right arm, Patient Position: Sitting, Cuff Size: Standard)   Pulse 76   Ht 5' 2\" (1.575 m)   Wt 63.1 kg (139 lb 3.2 oz)   SpO2 97%   BMI 25.46 kg/m²     Physical Exam  Vitals and nursing note reviewed.   Constitutional:       General: He is not in acute distress.     Appearance: Normal appearance. He is well-developed. He is not ill-appearing.   HENT:      Head: Normocephalic.   Eyes:      Conjunctiva/sclera: Conjunctivae normal.   Cardiovascular:      Rate and Rhythm: Normal rate and regular rhythm.      Pulses: Normal pulses.           Carotid pulses are 2+ on the right side and 2+ on the left side.       Radial pulses are 2+ on the right side and 2+ on the left side.        Posterior tibial pulses are 2+ on the right side and 2+ on the left side.      Heart sounds: Murmur heard.      Comments: Murmur was auscultated in the left carotid artery but was not auscultated in any other areas on the chest.  Pulmonary:      Effort: Pulmonary effort is normal. No respiratory distress.      Breath sounds: Normal breath sounds. No decreased breath sounds, wheezing, rhonchi or rales.   Abdominal:      General: Abdomen is flat. Bowel sounds are normal. There is no distension.      Palpations: Abdomen is soft.      Tenderness: There is no abdominal tenderness. There is no guarding.   Musculoskeletal:         General: No swelling.      Left shoulder: Tenderness and crepitus present. No swelling or effusion. Decreased range of motion. Normal strength.      Cervical back: Normal range of motion and neck supple.      Right lower leg: No edema.      Left lower leg: No edema.      Comments: Pain was noted with palpation of the left posterior rotator cuff area.  Patient did have noted decreased range of motion when attempting to reach behind himself.  Left lateral arm " raise was negative.  Apley's scratch test, and hornblower's sign were both positive for rotator cuff pathology.   Skin:     General: Skin is warm and dry.      Capillary Refill: Capillary refill takes less than 2 seconds.   Neurological:      General: No focal deficit present.      Mental Status: He is alert and oriented to person, place, and time.   Psychiatric:         Mood and Affect: Mood normal.         Behavior: Behavior normal.         Thought Content: Thought content normal.         Judgment: Judgment normal.       Administrative Statements {Disappearing Hyperlinks I  [facility]  I have spent a total time of 70 minutes on 05/23/24 In caring for this patient including Diagnostic results, Prognosis, Risks and benefits of tx options, Instructions for management, Patient and family education, Importance of tx compliance, Risk factor reductions, Impressions, Counseling / Coordination of care, Documenting in the medical record, Reviewing / ordering tests, medicine, procedures  , and Obtaining or reviewing history  .  Reviewing outside providers notes, interpreting EKG, ordering lab work, ordering medications, and ordering testing.

## 2024-06-02 ENCOUNTER — HOSPITAL ENCOUNTER (OUTPATIENT)
Dept: MRI IMAGING | Facility: HOSPITAL | Age: 69
Discharge: HOME | End: 2024-06-02
Payer: MEDICARE

## 2024-06-02 DIAGNOSIS — S46.012A TRAUMATIC TEAR OF LEFT ROTATOR CUFF, UNSPECIFIED TEAR EXTENT, INITIAL ENCOUNTER: ICD-10-CM

## 2024-06-02 PROCEDURE — 73221 MRI JOINT UPR EXTREM W/O DYE: CPT

## 2024-06-06 ENCOUNTER — TELEPHONE (OUTPATIENT)
Dept: FAMILY MEDICINE CLINIC | Facility: CLINIC | Age: 69
End: 2024-06-06

## 2024-06-06 DIAGNOSIS — S46.012D TRAUMATIC INCOMPLETE TEAR OF LEFT ROTATOR CUFF, SUBSEQUENT ENCOUNTER: Primary | ICD-10-CM

## 2024-06-06 DIAGNOSIS — M19.012 PRIMARY OSTEOARTHRITIS OF LEFT SHOULDER: ICD-10-CM

## 2024-06-06 NOTE — TELEPHONE ENCOUNTER
----- Message from LINCOLN Liang sent at 6/6/2024 11:48 AM EDT -----  MRI did show a small left rotator cuff tear as well as osteoarthritis throughout the shoulder.  Orthopedics referral was placed for follow-up regarding this.

## 2024-06-17 ENCOUNTER — HOSPITAL ENCOUNTER (OUTPATIENT)
Dept: NON INVASIVE DIAGNOSTICS | Facility: HOSPITAL | Age: 69
Discharge: HOME | End: 2024-06-17
Payer: MEDICARE

## 2024-06-17 VITALS — WEIGHT: 139 LBS | HEIGHT: 62 IN | BODY MASS INDEX: 25.58 KG/M2

## 2024-06-17 DIAGNOSIS — R07.89 OTHER CHEST PAIN: ICD-10-CM

## 2024-06-17 DIAGNOSIS — R01.1 MURMUR, CARDIAC: ICD-10-CM

## 2024-06-17 DIAGNOSIS — R09.89 BRUIT OF LEFT CAROTID ARTERY: ICD-10-CM

## 2024-06-17 LAB
APICAL FOUR CHAMBER EJECTION FRACTION: 69 %
ASCENDING AORTA: 3.4 CM
AV LVOT MEAN GRADIENT: 5 MMHG
AV LVOT PEAK GRADIENT: 9 MMHG
AV REGURGITATION PRESSURE HALF TIME: 429 MS
CHEST PAIN STATEMENT: NORMAL
DOP CALC LVOT AREA: 2.83 CM2
DOP CALC LVOT CARDIAC INDEX: 3.18 L/MIN/M2
DOP CALC LVOT CARDIAC OUTPUT: 5.21 L/MIN
DOP CALC LVOT DIAMETER: 1.9 CM
DOP CALC LVOT PEAK VEL VTI: 26.02 CM
DOP CALC LVOT PEAK VEL: 1.53 M/S
DOP CALC LVOT STROKE INDEX: 43.3 ML/M2
DOP CALC LVOT STROKE VOLUME: 73.74
E WAVE DECELERATION TIME: 201 MS
E/A RATIO: 0.67
LAAS-AP4: 11.7 CM2
MAX DIASTOLIC BP: 59 MMHG
MAX HR PERCENT: 85 %
MAX HR: 129 BPM
MAX PREDICTED HEART RATE: 151 BPM
MV E'TISSUE VEL-LAT: 9 CM/S
MV E'TISSUE VEL-SEP: 9 CM/S
MV PEAK A VEL: 0.85 M/S
MV PEAK E VEL: 57 CM/S
PROTOCOL NAME: NORMAL
RATE PRESSURE PRODUCT: NORMAL
REASON FOR TERMINATION: NORMAL
RIGHT ATRIAL 2D VOLUME: 27 ML
RIGHT ATRIUM AREA SYSTOLE A4C: 12.5 CM2
RIGHT VENTRICLE ID DIMENSION: 2.9 CM
SL CV AV DECELERATION TIME RETROGRADE: 1479 MS
SL CV AV PEAK GRADIENT RETROGRADE: 55 MMHG
SL CV STRESS RECOVERY BP: NORMAL MMHG
SL CV STRESS RECOVERY HR: 90 BPM
SL CV STRESS RECOVERY O2 SAT: 97 %
STRESS ANGINA INDEX: 0
STRESS BASELINE BP: NORMAL MMHG
STRESS BASELINE HR: 79 BPM
STRESS O2 SAT REST: 95 %
STRESS PEAK HR: 129 BPM
STRESS POST ESTIMATED WORKLOAD: 1 METS
STRESS POST EXERCISE DUR MIN: 8 MIN
STRESS POST EXERCISE DUR MIN: 8 MIN
STRESS POST EXERCISE DUR SEC: 32 SEC
STRESS POST EXERCISE DUR SEC: 32 SEC
STRESS POST O2 SAT PEAK: 97 %
STRESS POST PEAK BP: 87 MMHG
STRESS POST PEAK HR: 129 BPM
STRESS POST PEAK SYSTOLIC BP: 125 MMHG
TARGET HR FORMULA: NORMAL
TEST INDICATION: NORMAL
TR MAX PG: 19 MMHG
TR PEAK VELOCITY: 2.2 M/S
TRICUSPID ANNULAR PLANE SYSTOLIC EXCURSION: 1.3 CM
TRICUSPID VALVE PEAK REGURGITATION VELOCITY: 2.17 M/S

## 2024-06-17 PROCEDURE — 93880 EXTRACRANIAL BILAT STUDY: CPT

## 2024-06-17 PROCEDURE — 93880 EXTRACRANIAL BILAT STUDY: CPT | Performed by: SURGERY

## 2024-06-17 PROCEDURE — 93350 STRESS TTE ONLY: CPT

## 2024-06-17 PROCEDURE — 93350 STRESS TTE ONLY: CPT | Performed by: INTERNAL MEDICINE

## 2024-06-17 RX ORDER — DOBUTAMINE HYDROCHLORIDE 200 MG/100ML
5-50 INJECTION INTRAVENOUS CONTINUOUS
Status: DISCONTINUED | OUTPATIENT
Start: 2024-06-17 | End: 2024-06-18 | Stop reason: HOSPADM

## 2024-06-17 RX ADMIN — DOBUTAMINE IN DEXTROSE 10 MCG/KG/MIN: 200 INJECTION, SOLUTION INTRAVENOUS at 14:17

## 2024-06-18 ENCOUNTER — RA CDI HCC (OUTPATIENT)
Dept: OTHER | Facility: HOSPITAL | Age: 69
End: 2024-06-18

## 2024-06-25 ENCOUNTER — OFFICE VISIT (OUTPATIENT)
Dept: FAMILY MEDICINE CLINIC | Facility: CLINIC | Age: 69
End: 2024-06-25
Payer: MEDICARE

## 2024-06-25 VITALS
WEIGHT: 142.4 LBS | BODY MASS INDEX: 26.2 KG/M2 | HEART RATE: 57 BPM | SYSTOLIC BLOOD PRESSURE: 112 MMHG | HEIGHT: 62 IN | TEMPERATURE: 96.6 F | DIASTOLIC BLOOD PRESSURE: 60 MMHG | OXYGEN SATURATION: 99 %

## 2024-06-25 DIAGNOSIS — S46.012D TRAUMATIC TEAR OF LEFT ROTATOR CUFF, UNSPECIFIED TEAR EXTENT, SUBSEQUENT ENCOUNTER: ICD-10-CM

## 2024-06-25 DIAGNOSIS — R07.89 OTHER CHEST PAIN: ICD-10-CM

## 2024-06-25 DIAGNOSIS — F17.210 NICOTINE DEPENDENCE, CIGARETTES, UNCOMPLICATED: ICD-10-CM

## 2024-06-25 DIAGNOSIS — M19.012 PRIMARY OSTEOARTHRITIS OF LEFT SHOULDER: ICD-10-CM

## 2024-06-25 DIAGNOSIS — F12.90 MARIJUANA USER: ICD-10-CM

## 2024-06-25 DIAGNOSIS — E78.00 HYPERCHOLESTEROLEMIA: ICD-10-CM

## 2024-06-25 DIAGNOSIS — E61.1 IRON DEFICIENCY: ICD-10-CM

## 2024-06-25 DIAGNOSIS — Z00.00 MEDICARE ANNUAL WELLNESS VISIT, SUBSEQUENT: Primary | ICD-10-CM

## 2024-06-25 DIAGNOSIS — R73.03 PREDIABETES: ICD-10-CM

## 2024-06-25 PROBLEM — R07.81 RIB PAIN ON RIGHT SIDE: Status: RESOLVED | Noted: 2018-03-19 | Resolved: 2024-06-25

## 2024-06-25 PROCEDURE — 99214 OFFICE O/P EST MOD 30 MIN: CPT | Performed by: NURSE PRACTITIONER

## 2024-06-25 PROCEDURE — G0438 PPPS, INITIAL VISIT: HCPCS | Performed by: NURSE PRACTITIONER

## 2024-06-25 RX ORDER — FERROUS SULFATE 324(65)MG
324 TABLET, DELAYED RELEASE (ENTERIC COATED) ORAL
Qty: 90 TABLET | Refills: 1 | Status: SHIPPED | OUTPATIENT
Start: 2024-06-25

## 2024-06-25 NOTE — ASSESSMENT & PLAN NOTE
Patient was started on a daily iron supplement.  Patient's iron level will be reassessed in the future.

## 2024-06-25 NOTE — ASSESSMENT & PLAN NOTE
Patient was advised to continue to limit sugar and carbohydrates in his diet.  Repeat hemoglobin A1c was ordered to be completed in the future.

## 2024-06-25 NOTE — PROGRESS NOTES
Ambulatory Visit  Name: Jeovany Rawls      : 1955      MRN: [id number]  Encounter Provider: LINCOLN Brasher  Encounter Date: 2024   Encounter department: [facility]    Assessment & Plan   1. Medicare annual wellness visit, subsequent  2. Other chest pain  Assessment & Plan:  All cardiac testing up to this point has been within normal limits for the patient's age.  Since the patient continues to experience intermittent episodes of left-sided chest pain I did refer him to a cardiologist for further management.  Orders:  -     Ambulatory Referral to Cardiology; Future  3. Iron deficiency  Assessment & Plan:  Patient was started on a daily iron supplement.  Patient's iron level will be reassessed in the future.  Orders:  -     ferrous sulfate 324 (65 Fe) mg; Take 1 tablet (324 mg total) by mouth daily before breakfast  -     Iron Panel (Includes Ferritin, Iron Sat%, Iron, and TIBC); Future  4. Hypercholesterolemia  Assessment & Plan:  Well-controlled on current regimen.  Repeat lipid panel was ordered to be completed prior to the patient's next office visit.  Orders:  -     Lipid panel; Future  5. Prediabetes  Assessment & Plan:  Patient was advised to continue to limit sugar and carbohydrates in his diet.  Repeat hemoglobin A1c was ordered to be completed in the future.  Orders:  -     Comprehensive metabolic panel; Future  -     Hemoglobin A1C; Future  6. Nicotine dependence, cigarettes, uncomplicated  -     CT lung screening program; Future; Expected date: 2024  7. Marijuana user  Assessment & Plan:  Low-dose CT of the chest was ordered to assess for any signs of lung cancer due to patient's continued prolonged marijuana use.  Orders:  -     CT lung screening program; Future; Expected date: 2024  8. Traumatic tear of left rotator cuff, unspecified tear extent, subsequent encounter  Assessment & Plan:  Patient was provided with the office number for  orthopedics today so that he can be scheduled for follow-up.  Patient was advised to continue using ice and Tylenol as needed for his pain.  9. Primary osteoarthritis of left shoulder  Assessment & Plan:  Patient was provided with the office number for orthopedics today so that he can be scheduled for follow-up.  Patient was advised to continue using ice and Tylenol as needed for his pain.    Depression Screening and Follow-up Plan: Patient was screened for depression during today's encounter. They screened negative with a PHQ-2 score of 0.      Preventive health issues were discussed with patient, and age appropriate screening tests were ordered as noted in patient's After Visit Summary. Personalized health advice and appropriate referrals for health education or preventive services given if needed, as noted in patient's After Visit Summary.    History of Present Illness   {Disappearing Hyperlinks I Encounters * My Last Note * Since Last Visit * History :68714}  Chest pain: At patient's last office visit he was reporting recurrent left-sided chest pain.  Patient did have an EKG completed at that time which showed normal sinus rhythm.  Patient also recently had a nuclear stress echocardiogram completed which was normal for the patient's age.  Patient also recently had carotid artery Dopplers completed which were noted to be normal.  Patient did recently have a CRP and ESR completed and both were noted to be slightly elevated however, patient does also have a left shoulder injury so this could explain the inflammation.  Other pertinent lab work related to the chest pain was normal.  The patient does report that he continues to note intermittent episodes of left-sided chest pain.  He also continues to note associated shortness of breath when he has the chest pain.  Patient does have diagnosed COPD but states that he does not experience recurrent dyspnea on exertion or any shortness of breath at rest and that he only  notices shortness of breath when he is having the chest pain.    Left rotator cuff tear: Patient did recently have an MRI of his left shoulder completed which did show a small left rotator cuff tear as well as osteoarthritis throughout the left shoulder.  The patient was previously referred to orthopedics but has not been scheduled to be seen by them yet.    Iron deficiency: Patient's most recent iron panel did show slight iron deficiency.  Patient is not currently taking an iron supplement.    Prediabetes: Patient's most recent hemoglobin A1c was 5.9 putting him in the prediabetes range.  Patient denies polydipsia, polyphagia, or polyuria.    Hypercholesterolemia: Patient's most recent lipid panel was normal.  Patient is currently managed on pravastatin 40 mg daily.    Marijuana use: Patient reports that he has been smoking marijuana daily for over 50 years at this point.  The patient has been previously diagnosed with COPD via PFTs.           Patient Care Team:  LINCOLN Brasher as PCP - General (Family Medicine)  MD Benji Nam MD Berhanu Geme, MD as Endoscopist    Review of Systems   Constitutional:  Negative for chills and fever.   HENT:  Negative for ear pain and sore throat.    Eyes:  Negative for pain and visual disturbance.   Respiratory:  Positive for chest tightness and shortness of breath (associated with chest pain). Negative for cough and wheezing.    Cardiovascular:  Positive for chest pain (left sided-intermittent). Negative for palpitations and leg swelling.   Gastrointestinal:  Negative for abdominal pain, constipation, diarrhea, nausea and vomiting.   Endocrine: Negative for cold intolerance and heat intolerance.   Genitourinary:  Negative for decreased urine volume, dysuria and hematuria.   Musculoskeletal:  Positive for arthralgias (left shoulder). Negative for back pain and myalgias.   Skin:  Negative for color change and rash.   Allergic/Immunologic: Negative for  environmental allergies.   Neurological:  Negative for dizziness, seizures, syncope, weakness, light-headedness, numbness and headaches.   Hematological:  Negative for adenopathy.   Psychiatric/Behavioral:  Negative for confusion. The patient is not nervous/anxious.    All other systems reviewed and are negative.    Medical History Reviewed by provider this encounter:       Annual Wellness Visit Questionnaire   Jeovany is here for his Subsequent Wellness visit.     Health Risk Assessment:   Patient rates overall health as good. Patient feels that their physical health rating is same. Patient is satisfied with their life. Eyesight was rated as same. Hearing was rated as same. Patient feels that their emotional and mental health rating is same. Patients states they are sometimes angry. Patient states they are sometimes unusually tired/fatigued. Pain experienced in the last 7 days has been some. Patient's pain rating has been 7/10. Patient states that he has experienced no weight loss or gain in last 6 months.     Depression Screening:   PHQ-2 Score: 0      Fall Risk Screening:   In the past year, patient has experienced: no history of falling in past year      Home Safety:  Patient has trouble with stairs inside or outside of their home. Patient has working smoke alarms and has working carbon monoxide detector. Home safety hazards include: none.     Nutrition:   Current diet is Regular.     Medications:   Patient is currently taking over-the-counter supplements. OTC medications include: see medication list. Patient is able to manage medications.     Activities of Daily Living (ADLs)/Instrumental Activities of Daily Living (IADLs):   Walk and transfer into and out of bed and chair?: Yes  Dress and groom yourself?: Yes    Bathe or shower yourself?: Yes    Feed yourself? Yes  Do your laundry/housekeeping?: Yes  Manage your money, pay your bills and track your expenses?: Yes  Make your own meals?: Yes    Do your own  "shopping?: Yes    Previous Hospitalizations:   Any hospitalizations or ED visits within the last 12 months?: No      Advance Care Planning:   Living will: No    Durable POA for healthcare: No    Advanced directive: No    Advanced directive counseling given: Yes      Cognitive Screening:   Provider or family/friend/caregiver concerned regarding cognition?: No    PREVENTIVE SCREENINGS      Cardiovascular Screening:    General: History Lipid Disorder and Screening Current      Diabetes Screening:     General: Screening Current      Colorectal Cancer Screening:     General: Screening Current      Prostate Cancer Screening:    General: Screening Current      Osteoporosis Screening:    General: Screening Not Indicated      Abdominal Aortic Aneurysm (AAA) Screening:    Risk factors include: age between 65-76 yo and tobacco use        General: Screening Current      Lung Cancer Screening:     General: Risks and Benefits Discussed    Due for: Low Dose CT (LDCT)      Hepatitis C Screening:    General: Screening Current    Screening, Brief Intervention, and Referral to Treatment (SBIRT)    Screening    Typical number of drinks in a week: 0    Single Item Drug Screening:  How often have you used an illegal drug (including marijuana) or a prescription medication for non-medical reasons in the past year? daily or almost daily    Single Item Drug Screen Score: 4  Interpretation: POSITIVE screen for possible drug use disorder    Drug Abuse Screening Test (DAST-10):  1) Have you used drugs other than those required for medical reasons? No  2) Do you abuse more than one drug at a time? No  3) Are you always able to stop using drugs when you want to? Yes  4) Have you had \"blackouts\" or \"flashbacks\" as a result of drug use? No  5) Do you ever feel bad or guilty about your drug use? No  6) Does your spouse (or parents) ever complain about your involvement with drugs? No  7) Have you neglected your family because of your use of drugs? " No  8) Have you engaged in illegal activities in order to obtain drugs? No  9) Have you ever experienced withdrawal symptoms (felt sick) when you stopped taking drugs? No  10) Have you had medical problems as a result of your drug use (e.g., memory loss, hepatitis, convulsions, bleeding, etc.)? No    DAST-10 Score: 0  Interpretation: No problems reported    SDOH Risk Assessment  Social determinants of health (SDOH) risk assesment tool was completed. The tool at a minimum covered housing stability, food insecurity, transportation needs, and utility difficulty. Patient had at risk responses for the following SDOH domains: food insecurity.     Other Counseling Topics:   Car/seat belt/driving safety, skin self-exam, sunscreen and calcium and vitamin D intake and regular weightbearing exercise.     Social Determinants of Health     Financial Resource Strain: High Risk (9/28/2023)    Overall Financial Resource Strain (CARDIA)    • Difficulty of Paying Living Expenses: Very hard   Food Insecurity: Food Insecurity Present (6/25/2024)    Hunger Vital Sign    • Worried About Running Out of Food in the Last Year: Sometimes true    • Ran Out of Food in the Last Year: Sometimes true   Transportation Needs: No Transportation Needs (6/25/2024)    PRAPARE - Transportation    • Lack of Transportation (Medical): No    • Lack of Transportation (Non-Medical): No   Housing Stability: Low Risk  (6/25/2024)    Housing Stability Vital Sign    • Unable to Pay for Housing in the Last Year: No    • Number of Times Moved in the Last Year: 0    • Homeless in the Last Year: No   Utilities: Not At Risk (6/25/2024)    [facility] Utilities    • Threatened with loss of utilities: No     No results found.    Objective   {Disappearing Hyperlinks   Review Vitals * Enter New Vitals * Results Review * Labs * Imaging * Cardiology * Procedures * Lung Cancer Screening :69710}  /60 (BP Location: Right arm, Patient Position: Sitting, Cuff Size: Standard)    "Pulse 57   Temp (!) 96.6 °F (35.9 °C) (Tympanic)   Ht 5' 2\" (1.575 m)   Wt 64.6 kg (142 lb 6.4 oz)   SpO2 99%   BMI 26.05 kg/m²     Physical Exam  Vitals and nursing note reviewed.   Constitutional:       General: He is not in acute distress.     Appearance: Normal appearance. He is well-developed. He is not ill-appearing.   HENT:      Head: Normocephalic.   Eyes:      Conjunctiva/sclera: Conjunctivae normal.   Cardiovascular:      Rate and Rhythm: Normal rate and regular rhythm.      Pulses:           Carotid pulses are 2+ on the right side and 2+ on the left side.       Radial pulses are 2+ on the right side and 2+ on the left side.        Posterior tibial pulses are 2+ on the right side and 2+ on the left side.      Heart sounds: Normal heart sounds. No murmur heard.  Pulmonary:      Effort: Pulmonary effort is normal. No respiratory distress.      Breath sounds: Normal breath sounds. No decreased breath sounds, wheezing, rhonchi or rales.   Abdominal:      General: Abdomen is flat. Bowel sounds are normal. There is no distension.      Palpations: Abdomen is soft.      Tenderness: There is no abdominal tenderness. There is no guarding.   Musculoskeletal:         General: No swelling. Normal range of motion.      Cervical back: Normal range of motion and neck supple.      Right lower leg: No edema.      Left lower leg: No edema.   Skin:     General: Skin is warm and dry.      Capillary Refill: Capillary refill takes less than 2 seconds.   Neurological:      General: No focal deficit present.      Mental Status: He is alert and oriented to person, place, and time.   Psychiatric:         Mood and Affect: Mood normal.         Behavior: Behavior normal.         Thought Content: Thought content normal.         Judgment: Judgment normal.       Administrative Statements {Disappearing Hyperlinks I  [id number]          "

## 2024-06-25 NOTE — ASSESSMENT & PLAN NOTE
Patient was provided with the office number for orthopedics today so that he can be scheduled for follow-up.  Patient was advised to continue using ice and Tylenol as needed for his pain.

## 2024-06-25 NOTE — ASSESSMENT & PLAN NOTE
Well-controlled on current regimen.  Repeat lipid panel was ordered to be completed prior to the patient's next office visit.

## 2024-06-25 NOTE — ASSESSMENT & PLAN NOTE
Low-dose CT of the chest was ordered to assess for any signs of lung cancer due to patient's continued prolonged marijuana use.

## 2024-06-25 NOTE — ASSESSMENT & PLAN NOTE
All cardiac testing up to this point has been within normal limits for the patient's age.  Since the patient continues to experience intermittent episodes of left-sided chest pain I did refer him to a cardiologist for further management.

## 2024-06-26 ENCOUNTER — TELEPHONE (OUTPATIENT)
Dept: OBGYN CLINIC | Facility: OTHER | Age: 69
End: 2024-06-26

## 2024-07-09 ENCOUNTER — HOSPITAL ENCOUNTER (OUTPATIENT)
Dept: CT IMAGING | Facility: HOSPITAL | Age: 69
Discharge: HOME | End: 2024-07-09
Payer: MEDICARE

## 2024-07-09 DIAGNOSIS — F12.90 MARIJUANA USER: ICD-10-CM

## 2024-07-09 DIAGNOSIS — F17.210 NICOTINE DEPENDENCE, CIGARETTES, UNCOMPLICATED: ICD-10-CM

## 2024-07-09 PROCEDURE — 71271 CT THORAX LUNG CANCER SCR C-: CPT

## 2024-07-25 ENCOUNTER — CONSULT (OUTPATIENT)
Dept: CARDIOLOGY CLINIC | Facility: CLINIC | Age: 69
End: 2024-07-25
Payer: MEDICARE

## 2024-07-25 VITALS
BODY MASS INDEX: 26.12 KG/M2 | OXYGEN SATURATION: 99 % | SYSTOLIC BLOOD PRESSURE: 148 MMHG | DIASTOLIC BLOOD PRESSURE: 60 MMHG | HEART RATE: 57 BPM | WEIGHT: 142.8 LBS

## 2024-07-25 DIAGNOSIS — I82.513 CHRONIC DEEP VEIN THROMBOSIS (DVT) OF FEMORAL VEIN OF BOTH LOWER EXTREMITIES (HCC): ICD-10-CM

## 2024-07-25 DIAGNOSIS — I73.9 PERIPHERAL VASCULAR DISEASE (HCC): ICD-10-CM

## 2024-07-25 DIAGNOSIS — I70.212 ATHEROSCLEROSIS OF NATIVE ARTERY OF LEFT LOWER EXTREMITY WITH INTERMITTENT CLAUDICATION (HCC): ICD-10-CM

## 2024-07-25 DIAGNOSIS — E78.00 HYPERCHOLESTEROLEMIA: ICD-10-CM

## 2024-07-25 DIAGNOSIS — I73.9 INTERMITTENT CLAUDICATION (HCC): ICD-10-CM

## 2024-07-25 DIAGNOSIS — R07.89 OTHER CHEST PAIN: ICD-10-CM

## 2024-07-25 DIAGNOSIS — I10 BENIGN ESSENTIAL HYPERTENSION: Primary | ICD-10-CM

## 2024-07-25 PROCEDURE — 99204 OFFICE O/P NEW MOD 45 MIN: CPT | Performed by: INTERNAL MEDICINE

## 2024-07-25 PROCEDURE — 93000 ELECTROCARDIOGRAM COMPLETE: CPT | Performed by: INTERNAL MEDICINE

## 2024-07-25 RX ORDER — ROSUVASTATIN CALCIUM 20 MG/1
20 TABLET, COATED ORAL DAILY
Qty: 90 TABLET | Refills: 3 | Status: SHIPPED | OUTPATIENT
Start: 2024-07-25

## 2024-07-25 NOTE — PROGRESS NOTES
Cardiology   Jeovany Rawls 68 y.o. male MRN: [id number]        Impression:  PAD - LLE claudication s/p bypass.  Followed by vascular surgery.  HTN - borderline control.  Dyslipidemia - borderline control.   Chest pain - atypical.  Appears musculoskeleta.    Recommendations:  Switch pravastatin for rosuvastatin 20mg daily.  Continue remainder of medications.  Follow up in 6 months.       HPI: Jeovany Rawls is a 68 y.o. year old male with PAD s/p LLE bypass, dyslipidemia, hypertension, and hx of DVT/PE who presents for evaluation for chest pain. No history of coronary artery disease.  Occurs when exerts upper body.  Associated with shortness of breath.  Has been occurring for 3-4 months.  Occurs once every week/every other week.  Lasts for 3-4 minutes.  Improves with rubbing chest. Does have some dyspnea.  Smokes marijuana.  Dobutamine stress echo demonstrated no ischemia.          Review of Systems   Constitutional: Negative.    HENT: Negative.     Eyes: Negative.    Respiratory:  Negative for chest tightness and shortness of breath.    Cardiovascular:  Positive for chest pain. Negative for palpitations and leg swelling.        LLE claudication   Gastrointestinal: Negative.    Endocrine: Negative.    Genitourinary: Negative.    Musculoskeletal: Negative.    Skin: Negative.    Allergic/Immunologic: Negative.    Neurological: Negative.    Hematological: Negative.    Psychiatric/Behavioral: Negative.     All other systems reviewed and are negative.        Past Medical History:   Diagnosis Date    Choking sensation 08/02/2016    COVID 01/17/2022    DVT (deep venous thrombosis) (HCC)     Hypercholesterolemia     Hypertension     Limb swelling 11/16/2015    Low back pain 06/04/2015    Myocardial infarction (HCC)     Nasal congestion 11/04/2019    Rib pain on right side 03/19/2018    Mild rib pain worse with movement for the last 3 months       Testicular discomfort 11/04/2015     Past Surgical History:   Procedure  Laterality Date    ANGIOPLASTY      orly lwr extr arter byp w/o throm w vein pacth angioplasty    ANTERIOR COMPARTMENT DECOMPRESSION      leg    CORONARY ARTERY BYPASS GRAFT      [place] COLONOSCOPY FLX DX W/COLLJ SPEC WHEN PFRMD N/A 05/17/2016    Procedure: COLONOSCOPY;  Surgeon: Benji Adair MD;  Location: [facility];  Service: Gastroenterology    VASCULAR SURGERY       Social History     Substance and Sexual Activity   Alcohol Use Not Currently     Social History     Substance and Sexual Activity   Drug Use Yes    Frequency: 7.0 times per week    Types: Marijuana     Social History     Tobacco Use   Smoking Status Every Day    Passive exposure: Current   Smokeless Tobacco Never     Family History   Problem Relation Age of Onset    Other Mother         brain tumor    Lung cancer Mother     Heart attack Mother     Diabetes type II Mother     Heart disease Mother     Heart disease Father     Lung cancer Father     Heart attack Father     Diabetes type II Father        Allergies:  Allergies   Allergen Reactions    Penicillins Rash       Medications:     Current Outpatient Medications:     Aspirin (ASPIR-81 PO), Take by mouth, Disp: , Rfl:     Cholecalciferol (Vitamin D) 50 MCG (2000 UT) tablet, Take 1 tablet (2,000 Units total) by mouth daily, Disp: 90 tablet, Rfl: 3    cilostazol (PLETAL) 100 mg tablet, Take 100 mg by mouth 2 (two) times a day, Disp: , Rfl:     cyclobenzaprine (FLEXERIL) 10 mg tablet, Take 1 tablet (10 mg total) by mouth 3 (three) times a day as needed for muscle spasms, Disp: 30 tablet, Rfl: 0    ergocalciferol (VITAMIN D2) 50,000 units, Take 1 capsule (50,000 Units total) by mouth once a week, Disp: 12 capsule, Rfl: 1    ferrous sulfate 324 (65 Fe) mg, Take 1 tablet (324 mg total) by mouth daily before breakfast, Disp: 90 tablet, Rfl: 1    fluticasone (FLONASE) 50 mcg/act nasal spray, INHALE 1 SPRAY IN TO EACH NOSTRIL ONCE DAILY, Disp: 16 g, Rfl: 3    Fluticasone Furoate-Vilanterol (Breo Ellipta)  100-25 mcg/actuation inhaler, Inhale 1 puff daily Rinse mouth after use, Disp: 60 each, Rfl: 3    lisinopril (ZESTRIL) 20 mg tablet, Take 1 tablet (20 mg total) by mouth daily, Disp: 90 tablet, Rfl: 3    metoprolol tartrate (LOPRESSOR) 25 mg tablet, TAKE ONE TABLET BY MOUTH EVERY 12 HOURS, Disp: 90 tablet, Rfl: 2    pravastatin (PRAVACHOL) 40 mg tablet, Take 1 tablet (40 mg total) by mouth daily, Disp: 90 tablet, Rfl: 3    Ventolin  (90 Base) MCG/ACT inhaler, INHALE 2 PUFFS BY MOUTH EVERY SIX HOURS AS NEEDED FOR WHEEZING, Disp: 18 g, Rfl: 0    Xarelto 20 MG tablet, TAKE ONE TABLET BY MOUTH ONCE DAILY, Disp: 90 tablet, Rfl: 12      Wt Readings from Last 3 Encounters:   07/25/24 64.8 kg (142 lb 12.8 oz)   06/25/24 64.6 kg (142 lb 6.4 oz)   06/17/24 63 kg (139 lb)     Temp Readings from Last 3 Encounters:   06/25/24 (!) 96.6 °F (35.9 °C) (Tympanic)   12/18/23 (!) 97 °F (36.1 °C) (Temporal)   11/30/23 (!) 97 °F (36.1 °C) (Temporal)     BP Readings from Last 3 Encounters:   07/25/24 148/60   06/25/24 112/60   05/23/24 128/60     Pulse Readings from Last 3 Encounters:   07/25/24 57   06/25/24 57   05/23/24 76         Physical Exam  HENT:      Head: Atraumatic.      Mouth/Throat:      Mouth: Mucous membranes are moist.   Eyes:      Extraocular Movements: Extraocular movements intact.   Cardiovascular:      Rate and Rhythm: Normal rate and regular rhythm.      Heart sounds: Normal heart sounds.   Pulmonary:      Effort: Pulmonary effort is normal.      Breath sounds: Normal breath sounds.   Abdominal:      General: Abdomen is flat.   Musculoskeletal:         General: Normal range of motion.      Cervical back: Normal range of motion.   Skin:     General: Skin is warm.   Neurological:      General: No focal deficit present.      Mental Status: He is alert and oriented to person, place, and time.   Psychiatric:         Mood and Affect: Mood normal.         Behavior: Behavior normal.           Laboratory  Studies:  CMP:  Lab Results   Component Value Date     06/17/2015    K 4.7 05/23/2024     05/23/2024    CO2 26 05/23/2024    ANIONGAP 6 06/17/2015    BUN 13 05/23/2024    CREATININE 1.22 05/23/2024    GLUCOSE 129 06/17/2015    AST 32 05/23/2024    ALT 21 05/23/2024    BILITOT 0.70 06/17/2015    EGFR 60 05/23/2024       Lipid Profile:   Lab Results   Component Value Date    CHOL 166 06/17/2015     Lab Results   Component Value Date    HDL 46 05/23/2024     Lab Results   Component Value Date    LDLCALC 88 05/23/2024     Lab Results   Component Value Date    TRIG 149 05/23/2024       Cardiac testing:   EKG reviewed personally: KARLOS Ku Nml

## 2024-07-25 NOTE — PATIENT INSTRUCTIONS
Recommendations:  Switch pravastatin for rosuvastatin 20mg daily.  Continue remainder of medications.  Follow up in 6 months.

## 2024-07-30 ENCOUNTER — OFFICE VISIT (OUTPATIENT)
Dept: OBGYN CLINIC | Facility: OTHER | Age: 69
End: 2024-07-30
Payer: MEDICARE

## 2024-07-30 VITALS
BODY MASS INDEX: 26.13 KG/M2 | DIASTOLIC BLOOD PRESSURE: 58 MMHG | HEIGHT: 62 IN | WEIGHT: 142 LBS | SYSTOLIC BLOOD PRESSURE: 124 MMHG

## 2024-07-30 DIAGNOSIS — M19.012 PRIMARY OSTEOARTHRITIS OF LEFT SHOULDER: ICD-10-CM

## 2024-07-30 DIAGNOSIS — S46.012D TRAUMATIC INCOMPLETE TEAR OF LEFT ROTATOR CUFF, SUBSEQUENT ENCOUNTER: ICD-10-CM

## 2024-07-30 PROCEDURE — 20610 DRAIN/INJ JOINT/BURSA W/O US: CPT | Performed by: FAMILY MEDICINE

## 2024-07-30 PROCEDURE — 99203 OFFICE O/P NEW LOW 30 MIN: CPT | Performed by: FAMILY MEDICINE

## 2024-07-30 RX ADMIN — TRIAMCINOLONE ACETONIDE 40 MG: 40 INJECTION, SUSPENSION INTRA-ARTICULAR; INTRAMUSCULAR at 10:00

## 2024-07-30 RX ADMIN — LIDOCAINE HYDROCHLORIDE 4 ML: 10 INJECTION, SOLUTION INFILTRATION; PERINEURAL at 10:00

## 2024-07-30 NOTE — PROGRESS NOTES
1. Traumatic incomplete tear of left rotator cuff, subsequent encounter  Ambulatory Referral to Orthopedic Surgery    [facility]      2. Primary osteoarthritis of left shoulder  Ambulatory Referral to Orthopedic Surgery    [facility]        Orders Placed This Encounter   Procedures    Large joint arthrocentesis    [facility]        IMAGING STUDIES: (I personally reviewed images in PACS and report):    Mri left shoulder 6/2/24:  Tiny high-grade partial-thickness articular surface tear of the distal supraspinatus tendon at its anterior attachment. Superimposed mild supraspinatus and infraspinatus tendinosis.     Moderate osteoarthritis of the glenohumeral joint with diffuse labral degeneration. Mild osteoarthritis of the acromioclavicular joint.     Mild subacromial/subdeltoid bursitis.       PAST REPORTS:        ASSESSMENT/PLAN:  Left Shoulder Partial cuff tear  Left shoulder mod GHOA    Repeat X-ray next visit: None    Return if symptoms worsen or fail to improve.    Patient instructions below verbally summarized in person during encounter:  Patient Instructions   Trial of conservative treatment for partial rotator cuff tear and tendinitis  If no improvement will consider separate guided injection for his arthritis    You have been diagnosed with Rotator cuff impingement which is a pinching of the tendon, or cable, that attaches to the arm bone known as the humerus on the outside of your shoulder and can lead to pain, feelings of weakness due to pain, and worsening pain with reaching overhead, repetitive movements with your arm, or lying on your shoulder at night. There is no indication today of a significant or large rotator cuff tear requiring surgery although it is possible you may still have a small tear that does not require surgery at the moment. Impingement and even small rotator cuff tear symptoms usually improve with rest, range of motion exercises, and physical therapy over 1-3  months but sometimes can take 4-6 months. Additional treatments include steroid injection for inflammation; however, the pain relief from steroid injections is not long lasting and the curative treatment is physical therapy. If you continue to fail to improve with conservative measures after 3 months or if there is a red flag, then we will discuss obtaining an MRI at future visits to evaluate for severe rotator cuff tear. Surgery for impingement syndrome is usually not considered until after 4-6 months of initial therapy unless a severe rotator cuff tear is found on MRI.         __________________________________________________________________________    HISTORY OF PRESENT ILLNESS:  Here for evaluation of left shoulder pain x 3 months.  Patient initially seen by PCP 5/23/2024 where MRI was ordered that did confirm partial rotator cuff tear.  Today, patient continues to have pain worse with motion of the shoulder motion          Review of Systems      Following history reviewed and update:    Past Medical History:   Diagnosis Date    Choking sensation 08/02/2016    COVID 01/17/2022    DVT (deep venous thrombosis) (HCC)     Hypercholesterolemia     Hypertension     Limb swelling 11/16/2015    Low back pain 06/04/2015    Myocardial infarction (HCC)     Nasal congestion 11/04/2019    Rib pain on right side 03/19/2018    Mild rib pain worse with movement for the last 3 months       Testicular discomfort 11/04/2015     Past Surgical History:   Procedure Laterality Date    ANGIOPLASTY      orly lwr extr arter byp w/o throm w vein pacth angioplasty    ANTERIOR COMPARTMENT DECOMPRESSION      leg    CORONARY ARTERY BYPASS GRAFT      RI COLONOSCOPY FLX DX W/COLLJ SPEC WHEN PFRMD N/A 05/17/2016    Procedure: COLONOSCOPY;  Surgeon: Benji Adair MD;  Location: [facility];  Service: Gastroenterology    VASCULAR SURGERY       Social History   Social History     Substance and Sexual Activity   Alcohol Use Not Currently     Social  "History     Substance and Sexual Activity   Drug Use Yes    Frequency: 7.0 times per week    Types: Marijuana     Social History     Tobacco Use   Smoking Status Every Day    Passive exposure: Current   Smokeless Tobacco Never     Family History   Problem Relation Age of Onset    Other Mother         brain tumor    Lung cancer Mother     Heart attack Mother     Diabetes type II Mother     Heart disease Mother     Heart disease Father     Lung cancer Father     Heart attack Father     Diabetes type II Father      Allergies   Allergen Reactions    Penicillins Rash          Physical Exam  /58 (BP Location: Right arm, Patient Position: Sitting, Cuff Size: Standard)   Ht 5' 2\" (1.575 m)   Wt 64.4 kg (142 lb)   BMI 25.97 kg/m²         Ortho Exam  LEFT SHOULDER:  Erythema: no  Swelling: no  Increased Warmth: no    Tenderness: +lateral    ROM  Touchdown sign: diminished mild  External Rotation: 80    Strength  Abduction: 4+/5  ER: 5/5  IR: 5/5    Drop-Arm: negative  Emptycan: +  Belly Press:   Lift-off Test:    Guzman: +  Neer: +  Cross-Arm:   Speeds:       RIGHT SHOULDER:  Strength  Abduction: 5/5  ER: 5/5  IR: 5/5    ROM  Touchdown sign: intact    Empty can: negative    __________________________________________________________________________  Large joint arthrocentesis: L subacromial bursa  Ozan Protocol:  Procedure performed by: (Dr Reed Hogan)  Consent: Verbal consent obtained.  Risks and benefits: risks, benefits and alternatives were discussed  Consent given by: patient  Time out: Immediately prior to procedure a \"time out\" was called to verify the correct patient, procedure, equipment, support staff and site/side marked as required.  Patient understanding: patient states understanding of the procedure being performed  Site marked: the operative site was marked  Patient identity confirmed: verbally with patient  Supporting Documentation  Indications: pain and diagnostic evaluation   Procedure " Details  Location: shoulder - L subacromial bursa  Preparation: Patient was prepped and draped in the usual sterile fashion  Needle size: 22 G  Ultrasound guidance: no  Approach: posterolateral  Medications administered: 4 mL lidocaine 1 %; 40 mg triamcinolone acetonide 40 mg/mL    Patient tolerance: patient tolerated the procedure well with no immediate complications  Dressing:  Sterile dressing applied

## 2024-07-30 NOTE — PATIENT INSTRUCTIONS
Trial of conservative treatment for partial rotator cuff tear and tendinitis  If no improvement will consider separate guided injection for his arthritis    You have been diagnosed with Rotator cuff impingement which is a pinching of the tendon, or cable, that attaches to the arm bone known as the humerus on the outside of your shoulder and can lead to pain, feelings of weakness due to pain, and worsening pain with reaching overhead, repetitive movements with your arm, or lying on your shoulder at night. There is no indication today of a significant or large rotator cuff tear requiring surgery although it is possible you may still have a small tear that does not require surgery at the moment. Impingement and even small rotator cuff tear symptoms usually improve with rest, range of motion exercises, and physical therapy over 1-3 months but sometimes can take 4-6 months. Additional treatments include steroid injection for inflammation; however, the pain relief from steroid injections is not long lasting and the curative treatment is physical therapy. If you continue to fail to improve with conservative measures after 3 months or if there is a red flag, then we will discuss obtaining an MRI at future visits to evaluate for severe rotator cuff tear. Surgery for impingement syndrome is usually not considered until after 4-6 months of initial therapy unless a severe rotator cuff tear is found on MRI.

## 2024-07-31 RX ORDER — LIDOCAINE HYDROCHLORIDE 10 MG/ML
4 INJECTION, SOLUTION INFILTRATION; PERINEURAL
Status: COMPLETED | OUTPATIENT
Start: 2024-07-30 | End: 2024-07-30

## 2024-07-31 RX ORDER — TRIAMCINOLONE ACETONIDE 40 MG/ML
40 INJECTION, SUSPENSION INTRA-ARTICULAR; INTRAMUSCULAR
Status: COMPLETED | OUTPATIENT
Start: 2024-07-30 | End: 2024-07-30

## 2024-08-26 ENCOUNTER — HOSPITAL ENCOUNTER (OUTPATIENT)
Dept: NON INVASIVE DIAGNOSTICS | Facility: CLINIC | Age: 69
Discharge: HOME | End: 2024-08-26
Payer: COMMERCIAL

## 2024-08-26 DIAGNOSIS — I73.9 PAD (PERIPHERAL ARTERY DISEASE) (HCC): ICD-10-CM

## 2024-08-26 PROCEDURE — 93923 UPR/LXTR ART STDY 3+ LVLS: CPT

## 2024-08-26 PROCEDURE — 93925 LOWER EXTREMITY STUDY: CPT

## 2024-08-27 PROCEDURE — 93925 LOWER EXTREMITY STUDY: CPT | Performed by: SURGERY

## 2024-08-27 PROCEDURE — 93922 UPR/L XTREMITY ART 2 LEVELS: CPT | Performed by: SURGERY

## 2024-08-28 ENCOUNTER — TRANSCRIBE ORDERS (OUTPATIENT)
Dept: ADMINISTRATIVE | Facility: HOSPITAL | Age: 69
End: 2024-08-28

## 2024-08-28 DIAGNOSIS — I73.9 PERIPHERAL VASCULAR DISEASE (HCC): Primary | ICD-10-CM

## 2024-11-09 ENCOUNTER — PATIENT MESSAGE (OUTPATIENT)
Dept: FAMILY MEDICINE CLINIC | Facility: CLINIC | Age: 69
End: 2024-11-09

## 2024-11-09 DIAGNOSIS — E78.00 HYPERCHOLESTEROLEMIA: ICD-10-CM

## 2024-11-09 DIAGNOSIS — E61.1 IRON DEFICIENCY: ICD-10-CM

## 2024-11-09 DIAGNOSIS — S46.012A TRAUMATIC TEAR OF LEFT ROTATOR CUFF, UNSPECIFIED TEAR EXTENT, INITIAL ENCOUNTER: ICD-10-CM

## 2024-11-11 ENCOUNTER — PATIENT MESSAGE (OUTPATIENT)
Dept: FAMILY MEDICINE CLINIC | Facility: CLINIC | Age: 69
End: 2024-11-11

## 2024-11-11 RX ORDER — ROSUVASTATIN CALCIUM 20 MG/1
20 TABLET, COATED ORAL DAILY
Qty: 90 TABLET | Refills: 1 | Status: SHIPPED | OUTPATIENT
Start: 2024-11-11

## 2024-11-11 RX ORDER — CYCLOBENZAPRINE HCL 10 MG
10 TABLET ORAL 3 TIMES DAILY PRN
Qty: 30 TABLET | Refills: 0 | Status: SHIPPED | OUTPATIENT
Start: 2024-11-11

## 2024-11-11 RX ORDER — FERROUS SULFATE 324(65)MG
324 TABLET, DELAYED RELEASE (ENTERIC COATED) ORAL
Qty: 90 TABLET | Refills: 1 | Status: SHIPPED | OUTPATIENT
Start: 2024-11-11

## 2024-11-12 DIAGNOSIS — E55.9 VITAMIN D DEFICIENCY: ICD-10-CM

## 2024-11-12 DIAGNOSIS — R09.81 NASAL CONGESTION: ICD-10-CM

## 2024-11-12 DIAGNOSIS — I10 BENIGN ESSENTIAL HYPERTENSION: ICD-10-CM

## 2024-11-12 DIAGNOSIS — I26.99 PULMONARY EMBOLISM WITH INFARCTION (HCC): ICD-10-CM

## 2024-11-12 DIAGNOSIS — I82.413 ACUTE DEEP VEIN THROMBOSIS (DVT) OF FEMORAL VEIN OF BOTH LOWER EXTREMITIES (HCC): ICD-10-CM

## 2024-11-12 DIAGNOSIS — R06.02 SOB (SHORTNESS OF BREATH): ICD-10-CM

## 2024-11-13 RX ORDER — CHOLECALCIFEROL (VITAMIN D3) 50 MCG
2000 TABLET ORAL DAILY
Qty: 90 TABLET | Refills: 1 | Status: SHIPPED | OUTPATIENT
Start: 2024-11-13

## 2024-11-13 RX ORDER — FLUTICASONE PROPIONATE 50 MCG
2 SPRAY, SUSPENSION (ML) NASAL DAILY
Qty: 16 G | Refills: 3 | Status: SHIPPED | OUTPATIENT
Start: 2024-11-13

## 2024-11-13 RX ORDER — LISINOPRIL 20 MG/1
20 TABLET ORAL DAILY
Qty: 90 TABLET | Refills: 1 | Status: SHIPPED | OUTPATIENT
Start: 2024-11-13

## 2024-11-13 RX ORDER — ALBUTEROL SULFATE 90 UG/1
2 INHALANT RESPIRATORY (INHALATION) EVERY 4 HOURS PRN
Qty: 18 G | Refills: 0 | Status: SHIPPED | OUTPATIENT
Start: 2024-11-13

## 2024-11-13 RX ORDER — METOPROLOL TARTRATE 25 MG/1
25 TABLET, FILM COATED ORAL EVERY 12 HOURS
Qty: 90 TABLET | Refills: 1 | Status: SHIPPED | OUTPATIENT
Start: 2024-11-13

## 2024-11-18 ENCOUNTER — TELEPHONE (OUTPATIENT)
Dept: FAMILY MEDICINE CLINIC | Facility: CLINIC | Age: 69
End: 2024-11-18

## 2024-11-20 ENCOUNTER — TELEPHONE (OUTPATIENT)
Age: 69
End: 2024-11-20

## 2024-11-20 NOTE — TELEPHONE ENCOUNTER
[facility] benefits outweigh the risk of the medication. Per Carlos authorization is going to be send/forward to the pharmacy.

## 2024-11-20 NOTE — TELEPHONE ENCOUNTER
[facility]. Has some questions that the Dr needs to answer. Asked her to fax the information to the office so that the physician can complete and fax back.

## 2024-11-20 NOTE — TELEPHONE ENCOUNTER
PA for cyclobenzaprine (FLEXERIL) 10 mg tablet SUBMITTED to [employer]    via    [x]CMM-KEY: [id number]  []Surescripts-Case ID #   []Availity-Auth ID # NDC [id number]   []Faxed to plan   []Other website   []Phone call Case ID #     [x]PA sent as URGENT    All office notes, labs and other pertaining documents and studies sent. Clinical questions answered. Awaiting determination from insurance company.     Turnaround time for your insurance to make a decision on your Prior Authorization can take 7-21 business days.

## 2024-11-20 NOTE — TELEPHONE ENCOUNTER
Rcvd call from [place]/pharmacist at [facility]. They need to know that cyclobenzaprine has been discussed with that patient and the risks outweigh the benefits and the member will be monitored on this medication. Please call # [phone] to answer these questions. They will approve med when that is completed and the due date is farhat am.

## 2024-11-20 NOTE — TELEPHONE ENCOUNTER
I have discussed the medication with him and I do feel that the benefits outweigh the risks of the medication.

## 2024-11-21 NOTE — TELEPHONE ENCOUNTER
PA for cyclobenzaprine (FLEXERIL) 10 mg tablet APPROVED     Date(s) approved 11/20/24-2/18/25    Case #      Patient advised by          [][employer] Message  []Phone call  []LMOM   [x]L/M to call office as no active Communication consent on file  Used the [place] [employer] interpretor line.   []Unable to leave detailed message as VM not approved on Communication consent       Pharmacy advised by    []Fax  []Phone call    Approval letter scanned into [employer] Not available.

## 2025-01-07 ENCOUNTER — OFFICE VISIT (OUTPATIENT)
Dept: FAMILY MEDICINE CLINIC | Facility: CLINIC | Age: 70
End: 2025-01-07
Payer: COMMERCIAL

## 2025-01-07 VITALS
HEIGHT: 62 IN | BODY MASS INDEX: 28.71 KG/M2 | OXYGEN SATURATION: 93 % | HEART RATE: 68 BPM | WEIGHT: 156 LBS | DIASTOLIC BLOOD PRESSURE: 62 MMHG | SYSTOLIC BLOOD PRESSURE: 132 MMHG

## 2025-01-07 DIAGNOSIS — Z23 ENCOUNTER FOR IMMUNIZATION: ICD-10-CM

## 2025-01-07 DIAGNOSIS — I70.212 ATHEROSCLEROSIS OF NATIVE ARTERY OF LEFT LOWER EXTREMITY WITH INTERMITTENT CLAUDICATION (HCC): ICD-10-CM

## 2025-01-07 DIAGNOSIS — R73.03 PREDIABETES: ICD-10-CM

## 2025-01-07 DIAGNOSIS — J41.8 MIXED SIMPLE AND MUCOPURULENT CHRONIC BRONCHITIS (HCC): ICD-10-CM

## 2025-01-07 DIAGNOSIS — E61.1 IRON DEFICIENCY: ICD-10-CM

## 2025-01-07 DIAGNOSIS — I73.9 PERIPHERAL VASCULAR DISEASE (HCC): ICD-10-CM

## 2025-01-07 DIAGNOSIS — I82.513 CHRONIC DEEP VEIN THROMBOSIS (DVT) OF FEMORAL VEIN OF BOTH LOWER EXTREMITIES (HCC): ICD-10-CM

## 2025-01-07 DIAGNOSIS — E78.00 HYPERCHOLESTEROLEMIA: ICD-10-CM

## 2025-01-07 DIAGNOSIS — I10 BENIGN ESSENTIAL HYPERTENSION: ICD-10-CM

## 2025-01-07 DIAGNOSIS — I73.9 INTERMITTENT CLAUDICATION (HCC): Primary | ICD-10-CM

## 2025-01-07 PROBLEM — R07.89 OTHER CHEST PAIN: Status: RESOLVED | Noted: 2024-05-23 | Resolved: 2025-01-07

## 2025-01-07 PROCEDURE — 90662 IIV NO PRSV INCREASED AG IM: CPT | Performed by: NURSE PRACTITIONER

## 2025-01-07 PROCEDURE — G0008 ADMIN INFLUENZA VIRUS VAC: HCPCS | Performed by: NURSE PRACTITIONER

## 2025-01-07 PROCEDURE — 99214 OFFICE O/P EST MOD 30 MIN: CPT | Performed by: NURSE PRACTITIONER

## 2025-01-07 RX ORDER — CYCLOBENZAPRINE HCL 10 MG
10 TABLET ORAL 3 TIMES DAILY PRN
Qty: 90 TABLET | Refills: 1 | Status: SHIPPED | OUTPATIENT
Start: 2025-01-07

## 2025-01-07 NOTE — ASSESSMENT & PLAN NOTE
Patient does have a repeat lipid panel ordered to be completed prior to his next office visit.  Patient was advised to continue to limit fatty and fried foods in his diet.

## 2025-01-07 NOTE — ASSESSMENT & PLAN NOTE
Well-controlled on current regimen.  Patient is not interested in smoking cessation at this time.

## 2025-01-07 NOTE — ASSESSMENT & PLAN NOTE
Patient continues to follow with vascular surgery regarding this.  He reports that his symptoms are stable on current medication regimen.

## 2025-01-07 NOTE — PROGRESS NOTES
Name: Jeovany Rawls      : 1955      MRN: [id number]  Encounter Provider: LINCOLN Brasher  Encounter Date: 2025   Encounter department: [facility]  :  Assessment & Plan  Intermittent claudication (HCC)  Patient continues to follow with vascular surgery regarding this.  He reports that his symptoms are stable on current medication regimen.  Orders:  •  cyclobenzaprine (FLEXERIL) 10 mg tablet; Take 1 tablet (10 mg total) by mouth 3 (three) times a day as needed for muscle spasms    Atherosclerosis of native artery of left lower extremity with intermittent claudication (HCC)  Patient continues to follow with vascular surgery regarding this.  He reports that his symptoms are stable on current medication regimen.  Orders:  •  cyclobenzaprine (FLEXERIL) 10 mg tablet; Take 1 tablet (10 mg total) by mouth 3 (three) times a day as needed for muscle spasms    Peripheral vascular disease (HCC)  Patient continues to follow with vascular surgery regarding this.  He reports that his symptoms are stable on current medication regimen.  Orders:  •  cyclobenzaprine (FLEXERIL) 10 mg tablet; Take 1 tablet (10 mg total) by mouth 3 (three) times a day as needed for muscle spasms    Mixed simple and mucopurulent chronic bronchitis (HCC)  Well-controlled on current regimen.  Patient is not interested in smoking cessation at this time.       Encounter for immunization    Orders:  •  influenza vaccine, high-dose, PF 0.5 mL (Fluzone High Dose)    Chronic deep vein thrombosis (DVT) of femoral vein of both lower extremities (HCC)  Patient continues to follow with vascular surgery regarding this.  He reports that his symptoms are stable on current medication regimen.       Benign essential hypertension  Well-controlled on current regimen.       Prediabetes  Patient does have a repeat CMP and hemoglobin A1c ordered to be completed prior to his next office visit.  Patient was advised to continue to  limit sugar and carbohydrates in his diet.       Iron deficiency  Patient does have a repeat iron panel ordered to be completed prior to his next office visit.       Hypercholesterolemia  Patient does have a repeat lipid panel ordered to be completed prior to his next office visit.  Patient was advised to continue to limit fatty and fried foods in his diet.             Depression Screening and Follow-up Plan: Patient was screened for depression during today's encounter. They screened negative with a PHQ-2 score of 0.    Tobacco Cessation Counseling: Tobacco cessation counseling was provided. The patient is sincerely urged to quit consumption of tobacco. He is not ready to quit tobacco.       History of Present Illness     PVD/atherosclerosis of native artery of extremity with intermittent claudication: Patient continues to follow with vascular surgery regarding this and reports that his symptoms are currently stable.    Chronic DVT of both lower extremities: Patient continues to follow with vascular surgery regarding this and he is chronically anticoagulated with Xarelto.    Hypertension: Well-controlled on current regimen.  Patient denies lightheadedness, dizziness, frequent headaches, or vision changes.    Mixed simple and chronic mucopurulent bronchitis: Patient is currently managed on Breo daily and albuterol inhaler as needed.  Patient reports that his symptoms are stable on current medication regimen.    Prediabetes: Patient's most recent hemoglobin A1c was 5.9 putting him in the prediabetes range.  Patient denies polydipsia, polyphagia, or polyuria.    Iron deficiency: Patient's most recent iron level was slightly low.  Patient is currently taking a daily iron supplement.    Hypercholesterolemia: Patient's most recent lipid panel was normal.  Patient is currently managed on Crestor 20 mg daily.          Review of Systems   Constitutional:  Negative for chills and fever.   HENT:  Negative for ear pain and sore  "throat.    Eyes:  Negative for pain and visual disturbance.   Respiratory:  Negative for cough, chest tightness, shortness of breath and wheezing.    Cardiovascular:  Negative for chest pain, palpitations and leg swelling.   Gastrointestinal:  Negative for abdominal pain, constipation, diarrhea, nausea and vomiting.   Endocrine: Negative for cold intolerance and heat intolerance.   Genitourinary:  Negative for decreased urine volume, dysuria and hematuria.   Musculoskeletal:  Negative for arthralgias, back pain and myalgias.   Skin:  Negative for color change and rash.   Allergic/Immunologic: Negative for environmental allergies.   Neurological:  Negative for dizziness, seizures, syncope, weakness, light-headedness, numbness and headaches.   Hematological:  Negative for adenopathy.   Psychiatric/Behavioral:  Negative for confusion. The patient is not nervous/anxious.    All other systems reviewed and are negative.      Objective   /62 (BP Location: Right arm, Patient Position: Sitting, Cuff Size: Standard)   Pulse 68   Ht 5' 2\" (1.575 m)   Wt 70.8 kg (156 lb)   SpO2 93%   BMI 28.53 kg/m²      Physical Exam  Vitals and nursing note reviewed.   Constitutional:       General: He is not in acute distress.     Appearance: Normal appearance. He is well-developed. He is not ill-appearing.   HENT:      Head: Normocephalic.   Eyes:      Conjunctiva/sclera: Conjunctivae normal.   Cardiovascular:      Rate and Rhythm: Normal rate and regular rhythm.      Pulses: Normal pulses.           Carotid pulses are 2+ on the right side and 2+ on the left side.       Radial pulses are 2+ on the right side and 2+ on the left side.        Posterior tibial pulses are 2+ on the right side and 2+ on the left side.      Heart sounds: Normal heart sounds. No murmur heard.  Pulmonary:      Effort: Pulmonary effort is normal. No respiratory distress.      Breath sounds: Normal breath sounds. No decreased breath sounds, wheezing, " rhonchi or rales.   Abdominal:      General: Abdomen is flat. Bowel sounds are normal. There is no distension.      Palpations: Abdomen is soft.      Tenderness: There is no abdominal tenderness. There is no guarding.   Musculoskeletal:         General: No swelling. Normal range of motion.      Cervical back: Normal range of motion and neck supple.      Right lower leg: No edema.      Left lower leg: No edema.   Skin:     General: Skin is warm and dry.      Capillary Refill: Capillary refill takes less than 2 seconds.   Neurological:      General: No focal deficit present.      Mental Status: He is alert and oriented to person, place, and time.   Psychiatric:         Mood and Affect: Mood normal.         Behavior: Behavior normal.         Thought Content: Thought content normal.         Judgment: Judgment normal.

## 2025-01-07 NOTE — ASSESSMENT & PLAN NOTE
Patient continues to follow with vascular surgery regarding this.  He reports that his symptoms are stable on current medication regimen.  Orders:  •  cyclobenzaprine (FLEXERIL) 10 mg tablet; Take 1 tablet (10 mg total) by mouth 3 (three) times a day as needed for muscle spasms

## 2025-01-07 NOTE — ASSESSMENT & PLAN NOTE
Patient does have a repeat CMP and hemoglobin A1c ordered to be completed prior to his next office visit.  Patient was advised to continue to limit sugar and carbohydrates in his diet.

## 2025-01-10 DIAGNOSIS — I10 BENIGN ESSENTIAL HYPERTENSION: ICD-10-CM

## 2025-01-10 RX ORDER — METOPROLOL TARTRATE 25 MG/1
25 TABLET, FILM COATED ORAL EVERY 12 HOURS
Qty: 90 TABLET | Refills: 1 | Status: SHIPPED | OUTPATIENT
Start: 2025-01-10

## 2025-01-31 DIAGNOSIS — E55.9 VITAMIN D DEFICIENCY: ICD-10-CM

## 2025-01-31 DIAGNOSIS — R09.81 NASAL CONGESTION: ICD-10-CM

## 2025-01-31 RX ORDER — CHOLECALCIFEROL (VITAMIN D3) 50 MCG
2000 TABLET ORAL DAILY
Qty: 90 TABLET | Refills: 0 | Status: SHIPPED | OUTPATIENT
Start: 2025-01-31

## 2025-01-31 RX ORDER — FLUTICASONE PROPIONATE 50 MCG
SPRAY, SUSPENSION (ML) NASAL
Qty: 15 G | Refills: 5 | Status: SHIPPED | OUTPATIENT
Start: 2025-01-31

## 2025-02-07 DIAGNOSIS — I73.9 INTERMITTENT CLAUDICATION (HCC): ICD-10-CM

## 2025-02-07 DIAGNOSIS — I73.9 PERIPHERAL VASCULAR DISEASE (HCC): ICD-10-CM

## 2025-02-07 DIAGNOSIS — I70.212 ATHEROSCLEROSIS OF NATIVE ARTERY OF LEFT LOWER EXTREMITY WITH INTERMITTENT CLAUDICATION (HCC): ICD-10-CM

## 2025-02-10 RX ORDER — CYCLOBENZAPRINE HCL 10 MG
10 TABLET ORAL 3 TIMES DAILY PRN
Qty: 90 TABLET | Refills: 0 | Status: SHIPPED | OUTPATIENT
Start: 2025-02-10 | End: 2025-02-20

## 2025-02-10 NOTE — TELEPHONE ENCOUNTER
Requested Prescriptions     Pending Prescriptions Disp Refills    cyclobenzaprine (FLEXERIL) 10 mg tablet [Pharmacy Med Name: CYCLOBENZAPRINE HYDR 10MG TAB] 90 tablet 0     Sig: TAKE ONE TABLET BY MOUTH THREE TIMES A DAY AS NEEDED FOR MUSCLE SPASMS      LOV 1/7/25 F/U Non Scheduled, labs Active

## 2025-02-19 DIAGNOSIS — I70.212 ATHEROSCLEROSIS OF NATIVE ARTERY OF LEFT LOWER EXTREMITY WITH INTERMITTENT CLAUDICATION (HCC): ICD-10-CM

## 2025-02-19 DIAGNOSIS — I73.9 INTERMITTENT CLAUDICATION (HCC): ICD-10-CM

## 2025-02-19 DIAGNOSIS — I73.9 PERIPHERAL VASCULAR DISEASE (HCC): ICD-10-CM

## 2025-02-20 RX ORDER — CYCLOBENZAPRINE HCL 10 MG
10 TABLET ORAL 3 TIMES DAILY PRN
Qty: 120 TABLET | Refills: 0 | Status: SHIPPED | OUTPATIENT
Start: 2025-02-20

## 2025-03-04 ENCOUNTER — HOSPITAL ENCOUNTER (OUTPATIENT)
Dept: NON INVASIVE DIAGNOSTICS | Facility: HOSPITAL | Age: 70
Discharge: HOME | End: 2025-03-04
Payer: COMMERCIAL

## 2025-03-04 DIAGNOSIS — I73.9 PERIPHERAL VASCULAR DISEASE (HCC): ICD-10-CM

## 2025-03-04 PROCEDURE — 93923 UPR/LXTR ART STDY 3+ LVLS: CPT

## 2025-03-04 PROCEDURE — 93925 LOWER EXTREMITY STUDY: CPT

## 2025-03-10 PROCEDURE — 93925 LOWER EXTREMITY STUDY: CPT | Performed by: SURGERY

## 2025-03-10 PROCEDURE — 93922 UPR/L XTREMITY ART 2 LEVELS: CPT | Performed by: SURGERY

## 2025-03-11 ENCOUNTER — TELEPHONE (OUTPATIENT)
Dept: VASCULAR SURGERY | Facility: CLINIC | Age: 70
End: 2025-03-11

## 2025-03-11 NOTE — TELEPHONE ENCOUNTER
Attempted to contact patient to schedule appointment(s) listed below.  Requested patient call [phone] to schedule appointment(s).    Patient's appointment(s) are due now.    Dopplers  [] Abdominal Aorta Iliac (AOIL)  [] Carotid (CV)   [] Celiac and/or Mesenteric  [] Endovascular Aortic Repair (EVAR)   [] Hemodialysis Access (HD)   [x] Lower Limb Arterial (MONSE) DONE 3/4/25  [] Lower Limb Venous (LEV)  [] Lower Limb Venous Duplex with Reflux (LEVDR)  [] Renal Artery  [] Upper Limb Arterial (UEA)    [] Upper Limb Venous (UEV)              [] TRISTON and Waveform analysis     Advanced Imaging   [] CTA head/neck    [] CTA abdomen    [] CTA abdomen & pelvis    [] CT abdomen with/ without contrast  [] CT abdomen with contrast  [] CT abdomen without contrast    [] CT abdomen & pelvis with/ without contrast  [] CT abdomen & pelvis with contrast  [] CT abdomen & pelvis without contrast    Office Visit   [] New patient, patient last seen over 3 years ago  [x] Risk factor modification (RFM)   [] Follow up   [] Lost to follow up (LTFU)    Needs One year OV with [facility] for [facility]

## 2025-03-22 DIAGNOSIS — I26.99 PULMONARY EMBOLISM WITH INFARCTION (HCC): ICD-10-CM

## 2025-03-22 DIAGNOSIS — I10 BENIGN ESSENTIAL HYPERTENSION: ICD-10-CM

## 2025-03-22 DIAGNOSIS — I82.413 ACUTE DEEP VEIN THROMBOSIS (DVT) OF FEMORAL VEIN OF BOTH LOWER EXTREMITIES (HCC): ICD-10-CM

## 2025-03-24 RX ORDER — LISINOPRIL 20 MG/1
20 TABLET ORAL DAILY
Qty: 90 TABLET | Refills: 4 | OUTPATIENT
Start: 2025-03-24

## 2025-03-24 RX ORDER — METOPROLOL TARTRATE 25 MG/1
25 TABLET, FILM COATED ORAL 2 TIMES DAILY
Qty: 180 TABLET | Refills: 0 | Status: SHIPPED | OUTPATIENT
Start: 2025-03-24

## 2025-03-24 RX ORDER — RIVAROXABAN 20 MG/1
20 TABLET, FILM COATED ORAL DAILY
Qty: 90 TABLET | Refills: 4 | OUTPATIENT
Start: 2025-03-24

## 2025-03-31 DIAGNOSIS — I82.413 ACUTE DEEP VEIN THROMBOSIS (DVT) OF FEMORAL VEIN OF BOTH LOWER EXTREMITIES (HCC): ICD-10-CM

## 2025-03-31 DIAGNOSIS — I10 BENIGN ESSENTIAL HYPERTENSION: ICD-10-CM

## 2025-03-31 DIAGNOSIS — I26.99 PULMONARY EMBOLISM WITH INFARCTION (HCC): ICD-10-CM

## 2025-04-02 RX ORDER — RIVAROXABAN 20 MG/1
20 TABLET, FILM COATED ORAL DAILY
Qty: 90 TABLET | Refills: 0 | Status: SHIPPED | OUTPATIENT
Start: 2025-04-02

## 2025-04-02 RX ORDER — LISINOPRIL 20 MG/1
20 TABLET ORAL DAILY
Qty: 90 TABLET | Refills: 0 | Status: SHIPPED | OUTPATIENT
Start: 2025-04-02

## 2025-04-03 ENCOUNTER — RESULTS FOLLOW-UP (OUTPATIENT)
Dept: FAMILY MEDICINE CLINIC | Facility: CLINIC | Age: 70
End: 2025-04-03

## 2025-04-03 ENCOUNTER — APPOINTMENT (OUTPATIENT)
Dept: LAB | Facility: HOSPITAL | Age: 70
End: 2025-04-03
Payer: COMMERCIAL

## 2025-04-03 DIAGNOSIS — E61.1 IRON DEFICIENCY: ICD-10-CM

## 2025-04-03 LAB
IRON SATN MFR SERPL: 18 % (ref 15–50)
IRON SERPL-MCNC: 54 UG/DL (ref 50–212)
TIBC SERPL-MCNC: 296.8 UG/DL (ref 250–450)
TRANSFERRIN SERPL-MCNC: 212 MG/DL (ref 203–362)
UIBC SERPL-MCNC: 243 UG/DL (ref 155–355)

## 2025-04-03 PROCEDURE — 83540 ASSAY OF IRON: CPT

## 2025-04-03 PROCEDURE — 83550 IRON BINDING TEST: CPT

## 2025-04-04 ENCOUNTER — RESULTS FOLLOW-UP (OUTPATIENT)
Dept: FAMILY MEDICINE CLINIC | Facility: CLINIC | Age: 70
End: 2025-04-04

## 2025-04-16 NOTE — PROGRESS NOTES
Cardiology  Follow Up   Office Visit Note  Jeovany Rawls   69 y.o.   male   MRN: [id number]  [facility] [place]  [address]  [address]  [place] [address] [phone]    PCP: LINCOLN Brasher  Cardiologist: Dr. Min          Summary of Plan:  Heart healthy diet: Mediterranean or DASH.  Education provided  Smoking cessation imperative, counseling at provided x 5 minutes  He reports he is not interested in smoking cessation.  His blood pressure is elevated in the office today.  He just smoked a cigarette.  This may be related as his blood pressure has been satisfactory elsewhere.  If in the future, his BP is up we can increase lisinopril  Medication list reconciled and, pravastatin discontinued  F/U vasc surgery 25  Colon Ca screenin2016         Assessment/Plan  PAD  Status post left lower extremity bypass  S/p revascularization with fasciotomies of his left lower leg in [place] greater than 20 years ago.  + Claudication  On aspirin 81, Pletal, statin  Followed by vascular surgery.  Upcoming appointment  Carotid duplex 2024: No evidence of disease  Hypertension  /66.  He had just smoked a cigarette and then came into the office.  This may be related.  His blood pressure has been better elsewhere  If his BP remains elevated we can increase lisinopril to 20 mg twice daily  On lisinopril 20 mg daily, metoprolol tartrate 25 mg every 12  Dyslipidemia  On rosuvastatin 20 mg daily  4/3/25:LDL 47 non-HDL 71  LDL goal less than 55.  At goal, continue the current plan   Latest Reference Range & Units 21 10:53 22 10:27 23 09:07 24 12:45 25 09:29   Cholesterol See Comment mg/dL 224 (H) 186 156 164 113   Triglycerides See Comment mg/dL 185 (H) 118 86 149 121   HDL >=40 mg/dL 42 38 (L) 48 46 42   Non-HDL Cholesterol mg/dl 182 148 108 118 71   LDL Calculated 0 - 100 mg/dL 145 (H) 124 (H) 91 88 47   History of atypical  chest pain, felt to be musculoskeletal, 7/2024  DSE: No ischemia  Chronic DVT both lower extremities  On OAC with Xarelto 20 mg daily  S/P IVC filter  Tobacco use.    Cessation imperative.  Counseling provided x 5 minutes.  Not interested in cessation.  Marijuana use,  with no interest in quitting  Cardiac testing  DSE 6/17/2024 EF 60%. Conclusion: Negative dobutamine stress echocardiogram for ECG or echocardiographic changes of ischemia. Normal resting left ventricular function.  Other echocardiographic findings as noted below.  Mild LVH.  Grade 1 DD.  RV normal size and function.  Estimated RVSP 23 mmHg.  Atria normal.  Mild TR.               HPI  Jeovany Rawls is a 69 y.o.year old male with PAD status post left lower extremity bypass, hypertension, dyslipidemia, history of DVT/PE.  There is no history of CAD.      7/25/2024 OV Dr. Min  Per his note:   presents for evaluation for chest pain. No history of coronary artery disease.  Occurs when exerts upper body.  Associated with shortness of breath.  Has been occurring for 3-4 months.  Occurs once every week/every other week.  Lasts for 3-4 minutes.  Improves with rubbing chest. Does have some dyspnea.  Smokes marijuana.  Dobutamine stress echo demonstrated no ischemia.    Recommendations:  Switch pravastatin for rosuvastatin 20mg daily.  Continue remainder of medications.  Follow up in 6 months.        4/17/25  Cardiology follow-up  He is accompanied by his daughter.  LDL 47 non-HDL 71  Last labs 4/3/2025: Creatinine 0.9 potassium 4.3.  Hemoglobin 14.1  BP elevated 163/66.  He just smoked a cigarette.  This may be related.  It has been better elsewhere.  Will continue to monitor and uptitrate medications if needed  Wt 159 pounds, stable  tobacco: He continues to smoke.  He enjoys it.  He has no interest in quitting.  If it would help his leg.  He appears euvolemic  I encouraged smoking cessation, he can call us if he changes his mind and would like NRT.   He continues to smoke marijuana.  He is taking the rosuvastatin.  Pravastatin was discontinued on his list.  He will see Dr. Min in 6 months        Review of Systems   Constitutional: Negative for chills.   Cardiovascular:  Positive for claudication. Negative for chest pain, cyanosis, dyspnea on exertion, irregular heartbeat, leg swelling, near-syncope, orthopnea, palpitations, paroxysmal nocturnal dyspnea and syncope.   Respiratory:  Negative for cough and shortness of breath.    Gastrointestinal:  Negative for heartburn and nausea.   Neurological:  Negative for dizziness, focal weakness, headaches, light-headedness and weakness.   All other systems reviewed and are negative.            Assessment  Diagnoses and all orders for this visit:    Atherosclerosis of native artery of left lower extremity with intermittent claudication (HCC)  -     POCT ECG        Past Medical History:   Diagnosis Date    Choking sensation 08/02/2016    COVID 01/17/2022    DVT (deep venous thrombosis) ([facility])     Hypercholesterolemia     Hypertension     Limb swelling 11/16/2015    Low back pain 06/04/2015    Myocardial infarction ([facility])     Nasal congestion 11/04/2019    Other chest pain 05/23/2024    Pulmonary embolism with infarction ([facility]) 03/21/2013    Rib pain on right side 03/19/2018    Mild rib pain worse with movement for the last 3 months       Testicular discomfort 11/04/2015       Past Surgical History:   Procedure Laterality Date    ANGIOPLASTY      orly lwr extr arter byp w/o throm w vein pacth angioplasty    ANTERIOR COMPARTMENT DECOMPRESSION      leg    CORONARY ARTERY BYPASS GRAFT      [place] COLONOSCOPY FLX DX W/COLLJ SPEC WHEN PFRMD N/A 05/17/2016    Procedure: COLONOSCOPY;  Surgeon: Benji Adair MD;  Location: [facility];  Service: Gastroenterology    VASCULAR SURGERY             Allergies  Allergies   Allergen Reactions    Penicillins Rash         Medications    Current Outpatient Medications:     albuterol (Ventolin  HFA) 90 mcg/act inhaler, Inhale 2 puffs every 4 (four) hours as needed for wheezing, Disp: 18 g, Rfl: 0    Aspirin (ASPIR-81 PO), Take by mouth, Disp: , Rfl:     Cholecalciferol (Vitamin D) 50 MCG (2000 UT) tablet, Take 1 tablet (2,000 Units total) by mouth daily, Disp: 90 tablet, Rfl: 0    cilostazol (PLETAL) 100 mg tablet, Take 100 mg by mouth 2 (two) times a day, Disp: , Rfl:     cyclobenzaprine (FLEXERIL) 10 mg tablet, TAKE ONE TABLET BY MOUTH THREE TIMES A DAY AS NEEDED FOR MUSCLE SPASMS, Disp: 120 tablet, Rfl: 0    ergocalciferol (VITAMIN D2) 50,000 units, Take 1 capsule (50,000 Units total) by mouth once a week, Disp: 12 capsule, Rfl: 1    ferrous sulfate 324 (65 Fe) mg, Take 1 tablet (324 mg total) by mouth daily before breakfast, Disp: 90 tablet, Rfl: 1    fluticasone (FLONASE) 50 mcg/act nasal spray, USE 2 SPRAYS IN TO EACH NOSTRIL ONCE DAILY, Disp: 15 g, Rfl: 5    Fluticasone Furoate-Vilanterol (Breo Ellipta) 100-25 mcg/actuation inhaler, Inhale 1 puff daily Rinse mouth after use, Disp: 60 each, Rfl: 3    lisinopril (ZESTRIL) 20 mg tablet, TAKE ONE TABLET BY MOUTH ONCE DAILY, Disp: 90 tablet, Rfl: 0    metoprolol tartrate (LOPRESSOR) 25 mg tablet, TAKE ONE TABLET BY MOUTH EVERY 12 HOURS, Disp: 180 tablet, Rfl: 0    rivaroxaban (Xarelto) 20 mg tablet, TAKE ONE TABLET BY MOUTH ONCE DAILY, Disp: 90 tablet, Rfl: 0    rosuvastatin (CRESTOR) 20 MG tablet, Take 1 tablet (20 mg total) by mouth daily, Disp: 90 tablet, Rfl: 1      Social History     Socioeconomic History    Marital status:      Spouse name: Not on file    Number of children: Not on file    Years of education: Not on file    Highest education level: Not on file   Occupational History    Not on file   Tobacco Use    Smoking status: Every Day     Passive exposure: Current    Smokeless tobacco: Never   Vaping Use    Vaping status: Never Used   Substance and Sexual Activity    Alcohol use: Not Currently    Drug use: Yes     Frequency: 7.0 times  per week     Types: Marijuana    Sexual activity: Not Currently     Partners: Female     Birth control/protection: None   Other Topics Concern    Not on file   Social History Narrative    No advance directives    Oriental orthodox     Social Drivers of Health     Financial Resource Strain: High Risk (9/28/2023)    Overall Financial Resource Strain (CARDIA)     Difficulty of Paying Living Expenses: Very hard   Food Insecurity: Food Insecurity Present (6/25/2024)    Nursing - Inadequate Food Risk Classification     Worried About Running Out of Food in the Last Year: Sometimes true     Ran Out of Food in the Last Year: Sometimes true     Ran Out of Food in the Last Year: Not on file   Transportation Needs: No Transportation Needs (6/25/2024)    PRAPARE - Transportation     Lack of Transportation (Medical): No     Lack of Transportation (Non-Medical): No   Physical Activity: Not on file   Stress: Not on file   Social Connections: Not on file   Intimate Partner Violence: Not on file   Housing Stability: Low Risk  (6/25/2024)    Housing Stability Vital Sign     Unable to Pay for Housing in the Last Year: No     Number of Times Moved in the Last Year: 0     Homeless in the Last Year: No       Family History   Problem Relation Age of Onset    Other Mother         brain tumor    Lung cancer Mother     Heart attack Mother     Diabetes type II Mother     Heart disease Mother     Heart disease Father     Lung cancer Father     Heart attack Father     Diabetes type II Father        Physical Exam  Vitals and nursing note reviewed.   Constitutional:       General: He is not in acute distress.     Comments: He smells of marijuana   HENT:      Head: Normocephalic and atraumatic.   Eyes:      Extraocular Movements: Extraocular movements intact.      Conjunctiva/sclera: Conjunctivae normal.   Cardiovascular:      Rate and Rhythm: Normal rate and regular rhythm.      Pulses: Intact distal pulses.      Heart sounds: Normal heart sounds.  "  Pulmonary:      Effort: Pulmonary effort is normal.      Breath sounds: Normal breath sounds.   Abdominal:      General: Bowel sounds are normal.      Palpations: Abdomen is soft.   Musculoskeletal:         General: Normal range of motion.      Cervical back: Normal range of motion and neck supple.   Skin:     General: Skin is warm and dry.   Neurological:      Mental Status: He is alert and oriented to person, place, and time.   Psychiatric:         Mood and Affect: Mood normal.         Vitals: Blood pressure 163/66, pulse 62, height 5' 2\" (1.575 m), weight 72.4 kg (159 lb 9.6 oz), SpO2 98%.   Wt Readings from Last 3 Encounters:   04/17/25 72.4 kg (159 lb 9.6 oz)   01/07/25 70.8 kg (156 lb)   07/30/24 64.4 kg (142 lb)           Labs & Results:  Lab Results   Component Value Date    WBC 6.64 05/23/2024    HGB 14.1 05/23/2024    HCT 42.9 05/23/2024    MCV 94 05/23/2024     05/23/2024     No results found for: \"BNP\"  No components found for: \"CHEM\"  Total CK   Date Value Ref Range Status   03/19/2014 151 39 - 308 U/L Final     Comment:     Total CK <161, CKMB not indicated  The above 1 [place] [address]       No results found for this or any previous visit.    No results found for this or any previous visit.    No valid procedures specified.  No results found for this or any previous visit.                This note was completed in part utilizing [employer] direct voice recognition software.   Grammatical errors, random word insertion, spelling mistakes, and incomplete sentences may be an occasional consequence of the system secondary to software limitations, ambient noise and hardware issues. At the time of dictation, efforts were made to edit, clarify and /or correct errors.  Please read the chart carefully and recognize, using context, where substitutions have occurred.  If you have any questions or concerns about the context, text or " information contained within the body of this dictation, please contact myself, the provider, for further clarification

## 2025-04-17 ENCOUNTER — OFFICE VISIT (OUTPATIENT)
Dept: CARDIOLOGY CLINIC | Facility: CLINIC | Age: 70
End: 2025-04-17
Payer: COMMERCIAL

## 2025-04-17 VITALS
DIASTOLIC BLOOD PRESSURE: 66 MMHG | BODY MASS INDEX: 29.37 KG/M2 | OXYGEN SATURATION: 98 % | HEART RATE: 62 BPM | WEIGHT: 159.6 LBS | HEIGHT: 62 IN | SYSTOLIC BLOOD PRESSURE: 163 MMHG

## 2025-04-17 DIAGNOSIS — I70.212 ATHEROSCLEROSIS OF NATIVE ARTERY OF LEFT LOWER EXTREMITY WITH INTERMITTENT CLAUDICATION (HCC): Primary | ICD-10-CM

## 2025-04-17 PROCEDURE — 99406 BEHAV CHNG SMOKING 3-10 MIN: CPT | Performed by: NURSE PRACTITIONER

## 2025-04-17 PROCEDURE — 93000 ELECTROCARDIOGRAM COMPLETE: CPT | Performed by: NURSE PRACTITIONER

## 2025-04-17 PROCEDURE — 99214 OFFICE O/P EST MOD 30 MIN: CPT | Performed by: NURSE PRACTITIONER

## 2025-04-17 NOTE — PATIENT INSTRUCTIONS
"Patient Education     Quitting smoking   The Basics   Written by the doctors and editors at [facility]   What are the benefits of quitting smoking? -- Quitting smoking can dramatically improve your health and help you live longer. It lowers your risk of heart disease, lung disease, kidney failure, cancer, infection, stomach problems, and diabetes.  Quitting smoking can also lower your chances of getting osteoporosis, a condition that makes your bones weak.  Quitting is not easy for most people, and it might take several tries to completely quit. But help and support are available. Quitting smoking will improve your health no matter how old you are, even if you have smoked for a long time.  What should I do if I want to quit smoking? -- It's a good idea to start by talking with your doctor or nurse. It is possible to quit on your own, without help. But getting help greatly increases your chances of quitting successfully.  When you are ready to quit, you will make a plan to:   Set a quit date.   Tell your family and friends that you plan to quit.   Plan ahead for the challenges you will face, such as cigarette cravings.   Remove cigarettes from your home, car, and work.  How can my doctor or nurse help? -- Your doctor or nurse can give you advice on the best way to quit. They can also give you medicines to:   Reduce your craving for cigarettes   Reduce your \"withdrawal\" symptoms (symptoms that happen when you stop smoking)  Your doctor or nurse can also help you find a counselor to talk to. For most people who are trying to quit smoking, it works best to use both medicines and counseling.  You can also get help from a free phone line (4-654-QUITNOW, or 1-464.708.3182) or go online to www.smokefree.gov.  What are the symptoms of withdrawal? -- When you stop smoking, you might have symptoms such as:   Trouble sleeping   Feeling irritable, anxious, or restless   Getting frustrated or angry   Having trouble thinking " clearly  These symptoms can be hard to deal with, which is why it can be so hard to quit. But medicines can help.  Some people who stop smoking become temporarily depressed. Some people need treatment for depression, such as counseling or medicines or both. People with depression might:   No longer enjoy or care about doing the things they used to like to do   Feel sad, down, hopeless, nervous, or cranky most of the day, almost every day   Lose or gain weight   Sleep too much or too little   Feel tired or like they have no energy   Feel guilty or like they are worth nothing   Forget things or feel confused   Move and speak more slowly than usual   Act restless or have trouble staying still   Think about death or suicide  If you think you might be depressed, tell your doctor or nurse right away. They can talk to you about your symptoms and recommend treatment if needed.  Get help right away if you are thinking of hurting or killing yourself! -- Sometimes, people with depression think of hurting or killing themselves. If you ever feel like you might hurt yourself, help is available:   In the , contact the [facility] Suicide & Crisis Lifeline:   To speak to someone, call or text [facility].   To talk to someone online, go to [email or web address]/chat.   Call your doctor or nurse, and tell them that it is an emergency.   Call for an ambulance (in the [place] and [place], call 9-1-1).   Go to the emergency department at your local hospital.  If you think your partner might have depression, or if you are worried that they might hurt themselves, get them help right away.  How does counseling work? -- A counselor can help you figure out:   What triggers you to want to smoke, and how to handle these situations   How to resist cravings   What you can do differently if you have tried to quit before  You can meet with a counselor in 1-on-1 sessions or as part of a group. There are other ways to get counseling, too, such as over the phone, through  "text messaging, or online.  How do medicines help you stop smoking? -- Different medicines work in different ways:   Nicotine replacement therapy - Nicotine is the main drug in cigarettes, and the reason they are addictive. These medicines reduce your body's craving for nicotine. They also help with withdrawal symptoms.  There are different forms of nicotine replacement, including skin patches, lozenges, gum, nasal sprays, and inhalers. Most can be bought without a prescription. Also, health insurance might cover some or all of the cost.  It often helps to use 2 forms of nicotine replacement. For example, you might wear a patch all the time, plus use gum or lozenges when you get a craving to smoke.   Varenicline - Varenicline (brand names: Chantix, Champix) is a prescription medicine that reduces withdrawal symptoms and cigarette cravings. Varenicline can increase the effects of alcohol in some people. It's a good idea to limit drinking while you're taking it, at least until you know how it affects you.  Even if you are not yet ready to commit to a quit date, varenicline can help reduce cravings. This can make it easier to quit when you are ready.   Bupropion - Bupropion (sample brand names: Wellbutrin, Zyban) is a prescription medicine that reduces your desire to smoke. It is also available in a generic version, which is cheaper than the brand name medicines. Doctors do not usually prescribe bupropion for people with seizures or who have had seizures in the past.  It might also be helpful to combine nicotine replacement with bupropion or varenicline. In some cases, a person might even take both bupropion and varenicline. Your doctor or nurse can help you figure out the best combination for you.  Can vaping help me quit? -- Sometimes, people wonder if vaping can help them quit smoking. Vaping is using electronic cigarettes, or \"e-cigarettes.\"  Doctors recommend using medicines and counseling to quit smoking. These " methods have been studied the most. But for people who have tried these and not been able to quit, switching to vaping might be an option. There are some things to remember:   Vaping might be less harmful than smoking regular cigarettes. But e-cigarettes still contain nicotine as well as other substances that might be harmful.   If you decide to try vaping to help you quit, it's important to switch completely from regular cigarettes to e-cigarettes. Using both will probably not be helpful, and might increase the risks of harm.   It's not clear how vaping can affect a person's health in the long term.  For these reasons, doctors recommend that if you do try vaping to help you quit smoking, you still make a plan to quit vaping eventually.  If you are interested in trying vaping to help you quit smoking, it's a good idea to talk to your doctor or nurse first.  What if I am pregnant and I smoke? -- If you are pregnant, it's really important for the health of your baby that you quit. Ask your doctor what options you have, and what is safest for your baby.  What if I have already smoked for a long time? -- The longer you have smoked, the higher your chances are of having health problems. But it is never too late to quit smoking. It helps your health even if you are older or have smoked for many years. The best thing you can do to lower your chance of having a health problem caused by smoking is to quit.  Will I gain weight if I quit? -- You might gain a few pounds. This can be frustrating for some people. But it's important to remember that you are improving your health by quitting smoking. You can help prevent gaining a lot of weight by staying active and eating a healthy diet.  What if I am not able to quit? -- If you don't quit on your first try, or if you quit but then start smoking again, don't give up hope. Lots of people have to try more than once before they are able to completely quit.  It might help to try to  understand why quitting did not work. There might be something you can do differently when you try again. It can help to figure out which situations make you want to smoke, so you can avoid them.  What else can I do to improve my chances of quitting? -- You can:   Get regular exercise. Any type of physical activity, even gentle forms of movement, is good for your health. Physical activity can also help reduce stress.   Stay away from people who smoke and places that make you want to smoke. If people close to you smoke, ask them to quit with you or avoid smoking around you.   Carry gum, hard candy, or something to put in your mouth. If you get a craving for a cigarette, try 1 of these instead.   Don't give up, even if you start smoking again. It takes most people a few tries before they succeed.  All topics are updated as new evidence becomes available and our peer review process is complete.  This topic retrieved from [employer] on: Mar 14, 2024.  Topic 39281 Version 33.0  Release: 32.2.4 - C32.72  © 2024 UpToDate, Inc. and/or its affiliates. All rights reserved.  Consumer Information Use and Disclaimer   Disclaimer: This generalized information is a limited summary of diagnosis, treatment, and/or medication information. It is not meant to be comprehensive and should be used as a tool to help the user understand and/or assess potential diagnostic and treatment options. It does NOT include all information about conditions, treatments, medications, side effects, or risks that may apply to a specific patient. It is not intended to be medical advice or a substitute for the medical advice, diagnosis, or treatment of a health care provider based on the health care provider's examination and assessment of a patient's specific and unique circumstances. Patients must speak with a health care provider for complete information about their health, medical questions, and treatment options, including any risks or benefits regarding  use of medications. This information does not endorse any treatments or medications as safe, effective, or approved for treating a specific patient. UpToDate, Inc. and its affiliates disclaim any warranty or liability relating to this information or the use thereof.The use of this information is governed by the Terms of Use, available at https://www.[employer].com/en/know/clinical-effectiveness-terms. 2024© UpToDate, Inc. and its affiliates and/or licensors. All rights reserved.  Copyright   © 2024 UpToDate, Inc. and/or its affiliates. All rights reserved.

## 2025-04-22 DIAGNOSIS — E55.9 VITAMIN D DEFICIENCY: ICD-10-CM

## 2025-04-23 RX ORDER — CHOLECALCIFEROL (VITAMIN D3) 50 MCG
2000 TABLET ORAL DAILY
Qty: 90 TABLET | Refills: 4 | Status: SHIPPED | OUTPATIENT
Start: 2025-04-23

## 2025-05-13 ENCOUNTER — OFFICE VISIT (OUTPATIENT)
Dept: VASCULAR SURGERY | Facility: CLINIC | Age: 70
End: 2025-05-13
Payer: COMMERCIAL

## 2025-05-13 ENCOUNTER — TELEPHONE (OUTPATIENT)
Dept: VASCULAR SURGERY | Facility: CLINIC | Age: 70
End: 2025-05-13

## 2025-05-13 VITALS
DIASTOLIC BLOOD PRESSURE: 69 MMHG | BODY MASS INDEX: 29.26 KG/M2 | SYSTOLIC BLOOD PRESSURE: 165 MMHG | HEIGHT: 62 IN | OXYGEN SATURATION: 99 % | HEART RATE: 60 BPM | WEIGHT: 159 LBS

## 2025-05-13 DIAGNOSIS — I10 BENIGN ESSENTIAL HYPERTENSION: ICD-10-CM

## 2025-05-13 DIAGNOSIS — I73.9 PAD (PERIPHERAL ARTERY DISEASE) (HCC): Primary | ICD-10-CM

## 2025-05-13 DIAGNOSIS — I70.212 ATHEROSCLEROSIS OF NATIVE ARTERY OF LEFT LOWER EXTREMITY WITH INTERMITTENT CLAUDICATION (HCC): ICD-10-CM

## 2025-05-13 DIAGNOSIS — E78.00 HYPERCHOLESTEROLEMIA: ICD-10-CM

## 2025-05-13 PROCEDURE — 99214 OFFICE O/P EST MOD 30 MIN: CPT

## 2025-05-13 NOTE — PROGRESS NOTES
Name: Jeovany Rawls      : 1955      MRN: [id number]  Encounter Provider: LINCOLN Caro  Encounter Date: 2025   Encounter department: [facility] [place]  :  Assessment & Plan  PAD (peripheral artery disease) ([facility])  Patient reports that he has been doing well since last office visit.  He continues to complain of bilateral lower extremity pain after walking 2-3 blocks, left worse than right.  Patient does note numbness to his left leg, as well as occasional swelling to the left lower extremity.  He denies any color/temperature change, rest pain, or tissue loss to the lower extremities.  Patient ambulates independently without assistive devices.  He reports occasional tobacco use and daily marijuana use.    Imaging:  LEAD 3/4/2025:  RIGHT LOWER LIMB:  Diffuse disease noted throughout the femoral-popliteal arteries without significant focal stenosis. Evidence suggestive of TIb/Peroneal occlusive disease, retrograde flow noted in  the distal peroneal artery.  Ankle/Brachial index: 1.04/127/103  LEFT LOWER LIMB:  The left popliteal artery appears occluded, with a collateral noted in the distal thigh and evidence of reconstitution in the tibioperoneal trunk. Evidence suggestive of diffuse TIb/Peroneal disease.  Ankle/Brachial index: 0.43/65/40    Plan:  -We discussed the pathophysiology of peripheral arterial disease as well as contributing lifestyle factors.  -We discussed the results of LEAD at length.  No significant change from previous study.  -Continue medical optimization with rosuvastatin, aspirin, and cilostazol  -Will repeat LEAD in 1 year  -Instructed patient to notify office of any worsening claudication, rest pain, or tissue loss  -Instructed patient to report to the ED for any symptoms of acute limb ischemia (color/temperature change, motor/sensory loss, tissue loss).  -Follow up in the office in 1 year    Orders:    VAS ARTERIAL DUPLEX- LOWER LIMB BILATERAL;  Future    Ambulatory Referral to Peripheral Artery Disease Supervised Exercise Therapy; Future    Atherosclerosis of native artery of left lower extremity with intermittent claudication (HCC)         Benign essential hypertension  Blood pressure is elevated in the office today, 165/69.    -Continue medical management per PCP.  -Low-salt diet         Hypercholesterolemia  Continue rosuvastatin.             History of Present Illness   HPI  Jeovany Rawls is a 69 y.o. male, current smoker, with PMH, HTN, HLD, osteoarthritis, COPD, chronic DVT, and PAD. Patient is presenting to the vascular surgery office to review results of LEAD 3/4/2025.    Patient reports that he has been doing well since last office visit.  He continues to complain of bilateral lower extremity pain after walking 2-3 blocks, left worse than right.  Patient does note numbness to his left leg, as well as occasional swelling to the left lower extremity.  He denies any color/temperature change, rest pain, or tissue loss to the lower extremities.  Patient ambulates independently without assistive devices.  He reports occasional tobacco use and daily marijuana use.    History obtained from: patient and patient's child    Review of Systems   Constitutional:  Negative for activity change.   Respiratory:  Negative for shortness of breath.    Cardiovascular:  Positive for leg swelling. Negative for chest pain.   Skin:  Negative for color change, pallor and wound.   Neurological:  Negative for weakness and numbness.     Pertinent Medical History   Past Medical History:   Diagnosis Date    Choking sensation 08/02/2016    COVID 01/17/2022    DVT (deep venous thrombosis) (HCC)     Hypercholesterolemia     Hypertension     Limb swelling 11/16/2015    Low back pain 06/04/2015    Myocardial infarction (HCC)     Nasal congestion 11/04/2019    Other chest pain 05/23/2024    Pulmonary embolism with infarction (HCC) 03/21/2013    Rib pain on right side 03/19/2018     Mild rib pain worse with movement for the last 3 months       Testicular discomfort 11/04/2015         Medical History Reviewed by provider this encounter:     .  Current Outpatient Medications on File Prior to Visit   Medication Sig Dispense Refill    albuterol (Ventolin HFA) 90 mcg/act inhaler Inhale 2 puffs every 4 (four) hours as needed for wheezing 18 g 0    Aspirin (ASPIR-81 PO) Take by mouth      Cholecalciferol (Vitamin D3) 50 MCG (2000 UT) TABS TAKE ONE TABLET BY MOUTH ONCE DAILY 90 tablet 4    cilostazol (PLETAL) 100 mg tablet Take 100 mg by mouth 2 (two) times a day      cyclobenzaprine (FLEXERIL) 10 mg tablet TAKE ONE TABLET BY MOUTH THREE TIMES A DAY AS NEEDED FOR MUSCLE SPASMS 120 tablet 0    ergocalciferol (VITAMIN D2) 50,000 units Take 1 capsule (50,000 Units total) by mouth once a week 12 capsule 1    ferrous sulfate 324 (65 Fe) mg Take 1 tablet (324 mg total) by mouth daily before breakfast 90 tablet 1    fluticasone (FLONASE) 50 mcg/act nasal spray USE 2 SPRAYS IN TO EACH NOSTRIL ONCE DAILY 15 g 5    Fluticasone Furoate-Vilanterol (Breo Ellipta) 100-25 mcg/actuation inhaler Inhale 1 puff daily Rinse mouth after use 60 each 3    lisinopril (ZESTRIL) 20 mg tablet TAKE ONE TABLET BY MOUTH ONCE DAILY 90 tablet 0    metoprolol tartrate (LOPRESSOR) 25 mg tablet TAKE ONE TABLET BY MOUTH EVERY 12 HOURS 180 tablet 0    rivaroxaban (Xarelto) 20 mg tablet TAKE ONE TABLET BY MOUTH ONCE DAILY 90 tablet 0    rosuvastatin (CRESTOR) 20 MG tablet Take 1 tablet (20 mg total) by mouth daily 90 tablet 1     No current facility-administered medications on file prior to visit.      Social History     Tobacco Use    Smoking status: Every Day     Passive exposure: Current    Smokeless tobacco: Never   Vaping Use    Vaping status: Never Used   Substance and Sexual Activity    Alcohol use: Not Currently    Drug use: Yes     Frequency: 7.0 times per week     Types: Marijuana    Sexual activity: Not Currently     Partners:  "Female     Birth control/protection: None        Objective   /69 (BP Location: Right arm, Patient Position: Sitting)   Pulse 60   Ht 5' 2\" (1.575 m)   Wt 72.1 kg (159 lb)   SpO2 99%   BMI 29.08 kg/m²      Physical Exam  Vitals and nursing note reviewed.   Constitutional:       General: He is not in acute distress.     Appearance: Normal appearance. He is well-developed.   HENT:      Head: Normocephalic and atraumatic.   Eyes:      Conjunctiva/sclera: Conjunctivae normal.   Cardiovascular:      Rate and Rhythm: Normal rate and regular rhythm.      Pulses:           Radial pulses are 2+ on the right side and 2+ on the left side.        Dorsalis pedis pulses are 1+ on the right side and detected w/ Doppler on the left side.        Posterior tibial pulses are 1+ on the right side and detected w/ Doppler on the left side.      Heart sounds: Normal heart sounds. No murmur heard.  Pulmonary:      Effort: Pulmonary effort is normal. No respiratory distress.      Breath sounds: Normal breath sounds.   Abdominal:      Palpations: Abdomen is soft.      Tenderness: There is no abdominal tenderness.   Musculoskeletal:         General: No swelling or tenderness.      Cervical back: Normal range of motion and neck supple.      Right lower leg: No edema.      Left lower leg: No edema.   Skin:     General: Skin is warm and dry.      Capillary Refill: Capillary refill takes less than 2 seconds.      Findings: No lesion, rash or wound.      Comments: No wounds to visualized skin   Neurological:      General: No focal deficit present.      Mental Status: He is alert and oriented to person, place, and time.   Psychiatric:         Mood and Affect: Mood normal.         Behavior: Behavior normal.         Administrative Statements   I have spent a total time of 30 minutes in caring for this patient on the day of the visit/encounter including Diagnostic results, Prognosis, Risks and benefits of tx options, Instructions for " management, Patient and family education, Importance of tx compliance, Risk factor reductions, Impressions, Counseling / Coordination of care, Documenting in the medical record, Reviewing/placing orders in the medical record (including tests, medications, and/or procedures), and Obtaining or reviewing history  .

## 2025-05-13 NOTE — PATIENT INSTRUCTIONS
We discussed initiating walking program to help improve collateral flow. Patient instructed to attempt walking 45-60 minutes at least three times per week. We discussed that the goal is to walk at a pace that would cause the patient to experience moderate claudication symptoms after 3-5 minutes. Once the patient feels they cannot tolerate the discomfort, they should rest until pain has subsided and then resume walking, repeating the process several times for a total walking time of 45-60 minutes. With strict adherence, improvement in walking distance and symptoms can be seen as early as 2 months.

## 2025-05-13 NOTE — ASSESSMENT & PLAN NOTE
Blood pressure is elevated in the office today, 165/69.    -Continue medical management per PCP.  -Low-salt diet

## 2025-05-13 NOTE — ASSESSMENT & PLAN NOTE
Patient reports that he has been doing well since last office visit.  He continues to complain of bilateral lower extremity pain after walking 2-3 blocks, left worse than right.  Patient does note numbness to his left leg, as well as occasional swelling to the left lower extremity.  He denies any color/temperature change, rest pain, or tissue loss to the lower extremities.  Patient ambulates independently without assistive devices.  He reports occasional tobacco use and daily marijuana use.    Imaging:  LEAD 3/4/2025:  RIGHT LOWER LIMB:  Diffuse disease noted throughout the femoral-popliteal arteries without significant focal stenosis. Evidence suggestive of TIb/Peroneal occlusive disease, retrograde flow noted in  the distal peroneal artery.  Ankle/Brachial index: 1.04/127/103  LEFT LOWER LIMB:  The left popliteal artery appears occluded, with a collateral noted in the distal thigh and evidence of reconstitution in the tibioperoneal trunk. Evidence suggestive of diffuse TIb/Peroneal disease.  Ankle/Brachial index: 0.43/65/40    Plan:  -We discussed the pathophysiology of peripheral arterial disease as well as contributing lifestyle factors.  -We discussed the results of LEAD at length.  No significant change from previous study.  -Continue medical optimization with rosuvastatin, aspirin, and cilostazol  -Will repeat LEAD in 1 year  -Instructed patient to notify office of any worsening claudication, rest pain, or tissue loss  -Instructed patient to report to the ED for any symptoms of acute limb ischemia (color/temperature change, motor/sensory loss, tissue loss).  -Follow up in the office in 1 year    Orders:    VAS ARTERIAL DUPLEX- LOWER LIMB BILATERAL; Future    Ambulatory Referral to Peripheral Artery Disease Supervised Exercise Therapy; Future

## 2025-05-17 DIAGNOSIS — E61.1 IRON DEFICIENCY: ICD-10-CM

## 2025-05-17 DIAGNOSIS — E78.00 HYPERCHOLESTEROLEMIA: ICD-10-CM

## 2025-05-18 RX ORDER — ROSUVASTATIN CALCIUM 20 MG/1
20 TABLET, COATED ORAL DAILY
Qty: 90 TABLET | Refills: 1 | Status: SHIPPED | OUTPATIENT
Start: 2025-05-18

## 2025-05-18 RX ORDER — FERROUS SULFATE 324(65)MG
TABLET, DELAYED RELEASE (ENTERIC COATED) ORAL
Qty: 90 TABLET | Refills: 1 | Status: SHIPPED | OUTPATIENT
Start: 2025-05-18

## 2025-06-12 DIAGNOSIS — I10 BENIGN ESSENTIAL HYPERTENSION: ICD-10-CM

## 2025-06-12 DIAGNOSIS — I26.99 PULMONARY EMBOLISM WITH INFARCTION (HCC): ICD-10-CM

## 2025-06-12 DIAGNOSIS — I82.413 ACUTE DEEP VEIN THROMBOSIS (DVT) OF FEMORAL VEIN OF BOTH LOWER EXTREMITIES (HCC): ICD-10-CM

## 2025-06-13 RX ORDER — METOPROLOL TARTRATE 25 MG/1
25 TABLET, FILM COATED ORAL 2 TIMES DAILY
Qty: 180 TABLET | Refills: 0 | Status: SHIPPED | OUTPATIENT
Start: 2025-06-13

## 2025-06-13 RX ORDER — RIVAROXABAN 20 MG/1
20 TABLET, FILM COATED ORAL DAILY
Qty: 90 TABLET | Refills: 0 | Status: SHIPPED | OUTPATIENT
Start: 2025-06-13

## 2025-06-13 RX ORDER — LISINOPRIL 20 MG/1
20 TABLET ORAL DAILY
Qty: 90 TABLET | Refills: 0 | Status: SHIPPED | OUTPATIENT
Start: 2025-06-13

## 2025-06-13 NOTE — TELEPHONE ENCOUNTER
Please advise patient to schedule an office visit on or after 6/26/2025 so that AWV can be completed.

## 2025-06-23 ENCOUNTER — OFFICE VISIT (OUTPATIENT)
Dept: FAMILY MEDICINE CLINIC | Facility: CLINIC | Age: 70
End: 2025-06-23
Payer: COMMERCIAL

## 2025-06-23 VITALS
HEART RATE: 78 BPM | WEIGHT: 161.6 LBS | HEIGHT: 62 IN | OXYGEN SATURATION: 97 % | BODY MASS INDEX: 29.74 KG/M2 | SYSTOLIC BLOOD PRESSURE: 136 MMHG | DIASTOLIC BLOOD PRESSURE: 68 MMHG

## 2025-06-23 DIAGNOSIS — M54.42 ACUTE LEFT-SIDED LOW BACK PAIN WITH LEFT-SIDED SCIATICA: Primary | ICD-10-CM

## 2025-06-23 DIAGNOSIS — I10 BENIGN ESSENTIAL HYPERTENSION: ICD-10-CM

## 2025-06-23 PROCEDURE — G2211 COMPLEX E/M VISIT ADD ON: HCPCS | Performed by: NURSE PRACTITIONER

## 2025-06-23 PROCEDURE — 99214 OFFICE O/P EST MOD 30 MIN: CPT | Performed by: NURSE PRACTITIONER

## 2025-06-23 NOTE — ASSESSMENT & PLAN NOTE
Patient's symptoms are consistent with left-sided sciatica.  X-ray of the lumbar spine was ordered to assess for any osseous abnormalities which could be contributing to sciatica symptoms.  Patient was advised to continue using Tylenol as needed for his pain.  He was also provided with sciatica exercises on his AVS today that he can complete at home.  I will have the patient return to the office in 1 month so I can reassess his symptoms.  Orders:  •  XR spine lumbar minimum 4 views non injury; Future

## 2025-06-23 NOTE — PROGRESS NOTES
Name: Jeovany Rawls      : 1955      MRN: [id number]  Encounter Provider: LINCOLN Brasher  Encounter Date: 2025   Encounter department: [facility]  :  Assessment & Plan  Acute left-sided low back pain with left-sided sciatica  Patient's symptoms are consistent with left-sided sciatica.  X-ray of the lumbar spine was ordered to assess for any osseous abnormalities which could be contributing to sciatica symptoms.  Patient was advised to continue using Tylenol as needed for his pain.  He was also provided with sciatica exercises on his AVS today that he can complete at home.  I will have the patient return to the office in 1 month so I can reassess his symptoms.  Orders:  •  XR spine lumbar minimum 4 views non injury; Future    Benign essential hypertension  Well-controlled on current regimen.              History of Present Illness   Lower back pain: Patient reports that over the past few months he has been experiencing recurrent left-sided lower back pain.  He also reports radiation of the pain to the left lower extremity with associated paresthesias as well.  He denies any instability of the left lower extremity.  He also denies any recent known falls or injuries to the back.  He does also report noting pain of the right lower extremity however, this only occurs with ambulation and due to his noted history of PVD this is likely claudication.  The patient has been taking Tylenol as needed for his pain with some improvement of his symptoms.    Hypertension: Well-controlled on current regimen.  Patient denies lightheadedness, dizziness, frequent headaches, or vision changes.      Review of Systems   Constitutional:  Negative for chills and fever.   HENT:  Negative for ear pain and sore throat.    Eyes:  Negative for pain and visual disturbance.   Respiratory:  Negative for cough, chest tightness, shortness of breath and wheezing.    Cardiovascular:  Negative for chest  "pain, palpitations and leg swelling.   Gastrointestinal:  Negative for abdominal pain, constipation, diarrhea, nausea and vomiting.   Endocrine: Negative for cold intolerance and heat intolerance.   Genitourinary:  Negative for decreased urine volume, dysuria and hematuria.   Musculoskeletal:  Positive for back pain (left lower) and myalgias (LLE). Negative for arthralgias, gait problem, joint swelling, neck pain and neck stiffness.   Skin:  Negative for color change and rash.   Allergic/Immunologic: Negative for environmental allergies.   Neurological:  Positive for numbness (parasthesias of LLE). Negative for dizziness, tremors, seizures, syncope, facial asymmetry, speech difficulty, weakness, light-headedness and headaches.   Hematological:  Negative for adenopathy.   Psychiatric/Behavioral:  Negative for confusion. The patient is not nervous/anxious.    All other systems reviewed and are negative.      Objective   /68 (BP Location: Right arm, Patient Position: Sitting, Cuff Size: Adult)   Pulse 78   Ht 5' 2\" (1.575 m)   Wt 73.3 kg (161 lb 9.6 oz)   SpO2 97%   BMI 29.56 kg/m²      Physical Exam  Vitals and nursing note reviewed.   Constitutional:       General: He is not in acute distress.     Appearance: Normal appearance. He is well-developed. He is not ill-appearing.   HENT:      Head: Normocephalic.     Eyes:      Conjunctiva/sclera: Conjunctivae normal.       Cardiovascular:      Rate and Rhythm: Normal rate and regular rhythm.      Pulses: Normal pulses.           Carotid pulses are 2+ on the right side and 2+ on the left side.       Radial pulses are 2+ on the right side and 2+ on the left side.        Posterior tibial pulses are 2+ on the right side and 2+ on the left side.      Heart sounds: Normal heart sounds. No murmur heard.  Pulmonary:      Effort: Pulmonary effort is normal. No respiratory distress.      Breath sounds: Normal breath sounds. No decreased breath sounds, wheezing, rhonchi " or rales.   Abdominal:      General: Abdomen is flat. There is no distension.      Palpations: Abdomen is soft.      Tenderness: There is no abdominal tenderness. There is no guarding.     Musculoskeletal:         General: No swelling. Normal range of motion.      Cervical back: Normal range of motion and neck supple.      Lumbar back: Tenderness present. No swelling, edema, spasms or bony tenderness. Normal range of motion. Negative right straight leg raise test and negative left straight leg raise test.      Right lower leg: No edema.      Left lower leg: No edema.      Comments: Patient reported pain with palpation of the left lumbar paraspinals.  Patient denied pain with palpation of the midline lumbar spine or bilateral SI notches.  Straight leg raise test was negative bilaterally.     Skin:     General: Skin is warm and dry.      Capillary Refill: Capillary refill takes less than 2 seconds.     Neurological:      General: No focal deficit present.      Mental Status: He is alert and oriented to person, place, and time.     Psychiatric:         Mood and Affect: Mood normal.         Behavior: Behavior normal.         Thought Content: Thought content normal.         Judgment: Judgment normal.

## 2025-06-23 NOTE — PATIENT INSTRUCTIONS
"Patient Education     Exercises for sciatic pain   The Basics   Written by the doctors and editors at [facility]   What is sciatica? -- The sciatic nerve is a large nerve that starts in the lower back. It runs all the way down the back of the leg.  Something like a disc or bone spur can pinch or damage the sciatic nerve. Tight, inflamed muscles can also pinch or damage it. This can cause pain, weakness, numbness, or tingling that goes from the buttock down the leg toward the heel. When these symptoms happen, people often call it \"sciatica.\" The medical name for this is \"radiculopathy.\"  You can have sciatic pain on 1 side or both. Most of the time, it gets better without surgery.  Why do I need to do exercises if I have sciatica? -- Stretching and strengthening exercises can help ease back pain. It might also help prevent future back pain. Long term, it is important to strengthen the muscles in your lower back, buttocks, and belly. These are your \"core muscles.\" Stretching exercises are also important to keep your muscles flexible.  Below are some stretching and strengthening exercises that might help you. Other forms of movement can help ease or prevent back pain, too. For example, some people like to walk or do aerobic exercise, yoga, or donavon chi. The most important thing is to move your body. Your doctor, nurse, or physical therapist can help you find different types of activity that work for you.  What stretching exercises should I do? -- Warm up your muscles before stretching. This helps prevent injury. To warm up, you can walk, jog, or cycle. Below are some examples of stretching exercises.  Start by repeating each of these stretches 2 to 3 times. For your body to make changes, try to hold each stretch for 5 to 10 seconds. Try to do the stretches 2 to 3 times each day. Breathe slowly and deeply as you do the exercises. Never bounce when doing stretches.   Single knee-to-chest stretches (figure 1) ? While lying on " your back, bend your knees with your feet flat on the floor. Pull 1 knee toward your chest until you feel a stretch in your lower back and buttock area. Lower, and repeat with the other knee. If you have knee problems, pull your knee up by grabbing the back of your thigh instead of the front of your knee. You can also do this exercise by grabbing both knees at the same time.   Deep hip stretches lying down (figure 2) ? Lie on your back, and bend 1 knee, keeping that foot flat on the floor. Cross the other leg over your knee. Grab the thigh of the leg that has the foot on the floor. Slowly, pull the bottom leg toward your chest until you feel a stretch in the other buttock. Repeat using the opposite leg as the bottom leg.   Deep hip stretches sitting (figure 3) ? Sit on the floor with both legs straight. Take 1 leg and cross it over the other leg so that the ankle or foot of your top leg is next to your other knee. Now, take the elbow on the opposite side of your bent knee and bring it to the outside of the bent knee. With your elbow, slowly push the bent knee further across your body to get a good stretch in the hip.   Sit backs (figure 4) - Start on your hands and knees. Your hands should be directly under your shoulders. Your knees should be spread slightly and directly under your hips. Slowly stretch your back as you bring your hips toward your ankles. Your arms are extended forward in a relaxed position, as your upper body sinks toward the floor.  What strengthening exercises should I do? -- Below are some examples of strengthening exercises.  Start by doing each exercise 2 to 3 times. Work up to doing each exercise 10 times. Hold each exercise for 3 to 5 seconds. Try to do the exercises 2 to 3 times each day. Do all exercises slowly.   Pelvic tilts (figure 5) ? Lie on your back with your knees bent and feet flat on the floor. Breathe slowly and deeply. Press your lower back down to the floor. Tighten your  stomach muscles as you breathe deeply and slowly, then relax.   Hip lifts (figure 6) ? Lie on your back with your knees bent and feet flat on the floor. Breathe deeply and slowly. Tighten your stomach muscles, keep your back flat, and lift your buttocks off the floor. Relax. You should feel this in your buttocks, not in your lower back.  What else should I know?    Exercise, including stretching, might be slightly uncomfortable, but you should not have sharp or severe pain. If you do get severe pain, stop what you are doing. If severe pain continues, call your doctor or nurse.   Do not hold your breath when exercising. If you tend to hold your breath, try counting out loud when exercising.   Always warm up your muscles before exercising. Stretching before warming up can lead to injury.   Doing exercises before a meal can help you get into a routine.  All topics are updated as new evidence becomes available and our peer review process is complete.  This topic retrieved from [employer] on: May 15, 2024.  Topic 095776 Version 1.0  Release: 32.4.3 - C32.134  © 2024 UpToDate, Inc. and/or its affiliates. All rights reserved.  figure 1: Single knee-to-chest stretch     Lie on your back, bend your knees, and have your feet flat on the floor. Pull 1 knee toward your chest until you feel a stretch in your lower back and buttock area. Repeat with the other knee. If you have knee problems, pull your knee up by grabbing the back of your thigh instead of the front of your knee. You can also do this exercise by grabbing both knees at the same time.  Graphic [id number] Version 1.0  figure 2: Deep hip stretch lying down     Lieon your back, and bend 1 knee, keeping that foot flat on the floor. Cross theother leg over your knee. Grab the thigh of the leg that has the foot on thefloor. Slowly, pull the bottom leg toward your chest until you feel a stretchin the other buttock. Repeat using the opposite leg as the bottom leg.  Graphic [id number]  Version 1.0  figure 3: Deep hip stretch sitting     Siton the floor with both legs straight. Take 1 leg and cross it over the otherleg so that the foot of your top leg is next to your outer knee. Now, take theelbow on the opposite side of your bent knee and bring it to the outside of thebent knee. With your elbow, slowly push the bent knee further across your bodyto get a good stretch in the hip.  Graphic [id number] Version 1.0  figure 4: Sit backs     Starton your hands and knees. Your hands should be directly under your shoulders.Your knees should be spread slightly and directly under your hips. Slowly stretchyour back as you bring your hips toward your ankles. Your arms should be extendedforward in a relaxed position, as your upper body sinks toward the floor.  Graphic [id number] Version 1.0  figure 5: Pelvic tilts     Lie on your back with your knees bent and feet flat on the floor. Tighten your stomach muscles and press your lower back down to the floor. Relax.  Graphic [id number] Version 1.0  figure 6: Hip lifts     Lie on your back with your knees bent and feet flat on the floor. Tighten your stomach muscles and lift your buttocks off of the floor. Relax.  Graphic [id number] Version 1.0  Consumer Information Use and Disclaimer   Disclaimer: This generalized information is a limited summary of diagnosis, treatment, and/or medication information. It is not meant to be comprehensive and should be used as a tool to help the user understand and/or assess potential diagnostic and treatment options. It does NOT include all information about conditions, treatments, medications, side effects, or risks that may apply to a specific patient. It is not intended to be medical advice or a substitute for the medical advice, diagnosis, or treatment of a health care provider based on the health care provider's examination and assessment of a patient's specific and unique circumstances. Patients must speak with a health care provider for  complete information about their health, medical questions, and treatment options, including any risks or benefits regarding use of medications. This information does not endorse any treatments or medications as safe, effective, or approved for treating a specific patient. UpToDate, Inc. and its affiliates disclaim any warranty or liability relating to this information or the use thereof.The use of this information is governed by the Terms of Use, available at https://www.[employer].com/en/know/clinical-effectiveness-terms. 2024© UpToDate, Inc. and its affiliates and/or licensors. All rights reserved.  Copyright   © 2024 UpToDate, Inc. and/or its affiliates. All rights reserved.

## 2025-07-05 DIAGNOSIS — R09.81 NASAL CONGESTION: ICD-10-CM

## 2025-07-07 RX ORDER — FLUTICASONE PROPIONATE 50 MCG
SPRAY, SUSPENSION (ML) NASAL
Qty: 16 G | Refills: 1 | Status: SHIPPED | OUTPATIENT
Start: 2025-07-07

## 2025-07-09 ENCOUNTER — HOSPITAL ENCOUNTER (OUTPATIENT)
Dept: RADIOLOGY | Facility: HOSPITAL | Age: 70
Discharge: HOME | End: 2025-07-09
Payer: COMMERCIAL

## 2025-07-09 DIAGNOSIS — M54.42 ACUTE LEFT-SIDED LOW BACK PAIN WITH LEFT-SIDED SCIATICA: ICD-10-CM

## 2025-07-09 PROCEDURE — 72110 X-RAY EXAM L-2 SPINE 4/>VWS: CPT

## 2025-07-21 ENCOUNTER — OFFICE VISIT (OUTPATIENT)
Dept: FAMILY MEDICINE CLINIC | Facility: CLINIC | Age: 70
End: 2025-07-21
Payer: COMMERCIAL

## 2025-07-21 VITALS
HEIGHT: 62 IN | BODY MASS INDEX: 30.33 KG/M2 | HEART RATE: 63 BPM | SYSTOLIC BLOOD PRESSURE: 138 MMHG | OXYGEN SATURATION: 98 % | WEIGHT: 164.8 LBS | DIASTOLIC BLOOD PRESSURE: 62 MMHG

## 2025-07-21 DIAGNOSIS — F17.200 SMOKER: ICD-10-CM

## 2025-07-21 DIAGNOSIS — I73.9 PAD (PERIPHERAL ARTERY DISEASE) (HCC): ICD-10-CM

## 2025-07-21 DIAGNOSIS — R73.03 PREDIABETES: ICD-10-CM

## 2025-07-21 DIAGNOSIS — Z12.5 SCREENING PSA (PROSTATE SPECIFIC ANTIGEN): ICD-10-CM

## 2025-07-21 DIAGNOSIS — J41.8 MIXED SIMPLE AND MUCOPURULENT CHRONIC BRONCHITIS (HCC): ICD-10-CM

## 2025-07-21 DIAGNOSIS — I82.513 CHRONIC DEEP VEIN THROMBOSIS (DVT) OF FEMORAL VEIN OF BOTH LOWER EXTREMITIES (HCC): ICD-10-CM

## 2025-07-21 DIAGNOSIS — Z87.891 PERSONAL HISTORY OF NICOTINE DEPENDENCE: ICD-10-CM

## 2025-07-21 DIAGNOSIS — I10 BENIGN ESSENTIAL HYPERTENSION: ICD-10-CM

## 2025-07-21 DIAGNOSIS — Z00.00 MEDICARE ANNUAL WELLNESS VISIT, SUBSEQUENT: Primary | ICD-10-CM

## 2025-07-21 DIAGNOSIS — E78.00 HYPERCHOLESTEROLEMIA: ICD-10-CM

## 2025-07-21 DIAGNOSIS — I70.212 ATHEROSCLEROSIS OF NATIVE ARTERY OF LEFT LOWER EXTREMITY WITH INTERMITTENT CLAUDICATION (HCC): ICD-10-CM

## 2025-07-21 PROBLEM — E61.1 IRON DEFICIENCY: Status: RESOLVED | Noted: 2024-06-25 | Resolved: 2025-07-21

## 2025-07-21 PROCEDURE — 99214 OFFICE O/P EST MOD 30 MIN: CPT | Performed by: NURSE PRACTITIONER

## 2025-07-21 PROCEDURE — G2211 COMPLEX E/M VISIT ADD ON: HCPCS | Performed by: NURSE PRACTITIONER

## 2025-07-21 PROCEDURE — G0439 PPPS, SUBSEQ VISIT: HCPCS | Performed by: NURSE PRACTITIONER

## 2025-07-21 RX ORDER — CYCLOBENZAPRINE HCL 10 MG
10 TABLET ORAL 3 TIMES DAILY PRN
Qty: 120 TABLET | Refills: 2 | Status: SHIPPED | OUTPATIENT
Start: 2025-07-21

## 2025-07-21 RX ORDER — FLUTICASONE FUROATE AND VILANTEROL 100; 25 UG/1; UG/1
1 POWDER RESPIRATORY (INHALATION) DAILY
Qty: 60 EACH | Refills: 3 | Status: SHIPPED | OUTPATIENT
Start: 2025-07-21

## 2025-07-21 RX ORDER — ALBUTEROL SULFATE 90 UG/1
2 INHALANT RESPIRATORY (INHALATION) EVERY 4 HOURS PRN
Qty: 18 G | Refills: 2 | Status: SHIPPED | OUTPATIENT
Start: 2025-07-21

## 2025-07-21 NOTE — ASSESSMENT & PLAN NOTE
Well-controlled on current regimen.  Orders:  •  Fluticasone Furoate-Vilanterol (Breo Ellipta) 100-25 mcg/actuation inhaler; Inhale 1 puff daily Rinse mouth after use  •  albuterol (Ventolin HFA) 90 mcg/act inhaler; Inhale 2 puffs every 4 (four) hours as needed for wheezing

## 2025-07-21 NOTE — ASSESSMENT & PLAN NOTE
Patient was advised to continue to limit sugar and carbohydrates in his diet.  Repeat CMP, hemoglobin A1c, and UA were ordered to be completed prior to his next office visit.  Orders:  •  Comprehensive metabolic panel; Future  •  Hemoglobin A1C; Future  •  UA w Reflex to Microscopic w Reflex to Culture; Future

## 2025-07-21 NOTE — ASSESSMENT & PLAN NOTE
Well-controlled on current regimen.  Repeat lipid panel was ordered to be completed prior to the patient's next office visit.  Orders:  •  Lipid panel; Future

## 2025-07-21 NOTE — ASSESSMENT & PLAN NOTE
Low-dose CT of the lungs was ordered to screen for any signs of lung cancer.  Patient is currently not interested in smoking cessation.  Orders:  •  CT Lung Screening Program; Future

## 2025-07-21 NOTE — ASSESSMENT & PLAN NOTE
Patient continues to follow with cardiology and vascular surgery regarding this condition.  Orders:  •  cyclobenzaprine (FLEXERIL) 10 mg tablet; Take 1 tablet (10 mg total) by mouth 3 (three) times a day as needed for muscle spasms

## 2025-07-21 NOTE — PROGRESS NOTES
Name: Jeovany Rawls      : 1955      MRN: [id number]  Encounter Provider: LINCOLN Brasher  Encounter Date: 2025   Encounter department: [facility]  :  Assessment & Plan  Medicare annual wellness visit, subsequent         Atherosclerosis of native artery of left lower extremity with intermittent claudication (HCC)  Patient continues to follow with vascular surgery and cardiology regarding this condition.  Patient reports that Flexeril use does improve his symptoms.  Orders:  •  cyclobenzaprine (FLEXERIL) 10 mg tablet; Take 1 tablet (10 mg total) by mouth 3 (three) times a day as needed for muscle spasms    Benign essential hypertension  Well-controlled on current regimen.  Orders:  •  CBC and differential; Future  •  Comprehensive metabolic panel; Future  •  UA w Reflex to Microscopic w Reflex to Culture; Future    Mixed simple and mucopurulent chronic bronchitis (HCC)  Well-controlled on current regimen.  Orders:  •  Fluticasone Furoate-Vilanterol (Breo Ellipta) 100-25 mcg/actuation inhaler; Inhale 1 puff daily Rinse mouth after use  •  albuterol (Ventolin HFA) 90 mcg/act inhaler; Inhale 2 puffs every 4 (four) hours as needed for wheezing    Hypercholesterolemia  Well-controlled on current regimen.  Repeat lipid panel was ordered to be completed prior to the patient's next office visit.  Orders:  •  Lipid panel; Future    Prediabetes  Patient was advised to continue to limit sugar and carbohydrates in his diet.  Repeat CMP, hemoglobin A1c, and UA were ordered to be completed prior to his next office visit.  Orders:  •  Comprehensive metabolic panel; Future  •  Hemoglobin A1C; Future  •  UA w Reflex to Microscopic w Reflex to Culture; Future    Screening PSA (prostate specific antigen)    Orders:  •  PSA, Total Screen; Future    Smoker  Low-dose CT of the lungs was ordered to screen for any signs of lung cancer.  Patient is currently not interested in smoking  cessation.  Orders:  •  CT Lung Screening Program; Future    Personal history of nicotine dependence    Orders:  •  CT Lung Screening Program; Future    PAD (peripheral artery disease) (HCC)  Patient continues to follow with cardiology and vascular surgery regarding this condition.  Orders:  •  cyclobenzaprine (FLEXERIL) 10 mg tablet; Take 1 tablet (10 mg total) by mouth 3 (three) times a day as needed for muscle spasms    Chronic deep vein thrombosis (DVT) of femoral vein of both lower extremities (HCC)  Patient continues to follow with cardiology and vascular surgery regarding this condition.         Depression Screening and Follow-up Plan: Patient was screened for depression during today's encounter. They screened negative with a PHQ-2 score of 0.        Preventive health issues were discussed with patient, and age appropriate screening tests were ordered as noted in patient's After Visit Summary. Personalized health advice and appropriate referrals for health education or preventive services given if needed, as noted in patient's After Visit Summary.    History of Present Illness     PAD/DVT of BLE/atherosclerosis of extremity with intermittent claudication: Patient continues to follow with vascular surgery and cardiology regarding these conditions.  Patient is anticoagulated with Xarelto.    Hypertension: Well-controlled on current regimen.  Patient denies lightheadedness, dizziness, frequent headaches, or vision changes.    Chronic bronchitis: Well-controlled with daily use of Breo and as needed use of albuterol inhaler.  Patient reports rarely needing to use the albuterol inhaler.    Prediabetes: Patient's most recent hemoglobin A1c was 5.9 keeping him in the prediabetes range.  Patient denies polydipsia, polyphagia, or polyuria.    Hypercholesterolemia: Patient's most recent lipid panel was normal.  Patient is currently managed on Crestor 20 mg daily.               Patient Care Team:  Aba Pina,  MAIANP as PCP - General (Family Medicine)  LINCOLN Brasher as PCP - PCP-[facility]  MD Benji Nam MD Berhanu Geme, MD as Endoscopist    Review of Systems   Constitutional:  Negative for chills and fever.   HENT:  Negative for ear pain and sore throat.    Eyes:  Negative for pain and visual disturbance.   Respiratory:  Negative for cough, chest tightness, shortness of breath and wheezing.    Cardiovascular:  Negative for chest pain, palpitations and leg swelling.   Gastrointestinal:  Negative for abdominal pain, constipation, diarrhea, nausea and vomiting.   Endocrine: Negative for cold intolerance and heat intolerance.   Genitourinary:  Negative for decreased urine volume, dysuria and hematuria.   Musculoskeletal:  Positive for myalgias (BLE-claudication). Negative for arthralgias, back pain and joint swelling.   Skin:  Negative for color change and rash.   Allergic/Immunologic: Negative for environmental allergies.   Neurological:  Negative for dizziness, seizures, syncope, weakness, light-headedness, numbness and headaches.   Hematological:  Negative for adenopathy.   Psychiatric/Behavioral:  Negative for confusion. The patient is not nervous/anxious.    All other systems reviewed and are negative.    Medical History Reviewed by provider this encounter:  Tobacco  Allergies  Meds  Problems  Med Hx  Surg Hx  Fam Hx       Annual Wellness Visit Questionnaire   Jeovany is here for his Subsequent Wellness visit.     Health Risk Assessment:   Patient rates overall health as very good. Patient feels that their physical health rating is slightly worse. Patient is satisfied with their life. Eyesight was rated as same. Hearing was rated as same. Patient feels that their emotional and mental health rating is slightly better. Patients states they are never, rarely angry. Patient states they are sometimes unusually tired/fatigued. Pain experienced in the last 7 days has been some.  Patient's pain rating has been 8/10. Patient states that he has experienced no weight loss or gain in last 6 months.     Depression Screening:   PHQ-2 Score: 0      Fall Risk Screening:   In the past year, patient has experienced: no history of falling in past year      Home Safety:  Patient has trouble with stairs inside or outside of their home. Patient has working smoke alarms and has working carbon monoxide detector. Home safety hazards include: none.     Nutrition:   Current diet is Regular.     Medications:   Patient is currently taking over-the-counter supplements. OTC medications include: see medication list. Patient is able to manage medications.     Activities of Daily Living (ADLs)/Instrumental Activities of Daily Living (IADLs):   Walk and transfer into and out of bed and chair?: Yes  Dress and groom yourself?: Yes    Bathe or shower yourself?: Yes    Feed yourself? Yes  Do your laundry/housekeeping?: Yes  Manage your money, pay your bills and track your expenses?: Yes  Make your own meals?: Yes    Do your own shopping?: Yes    Previous Hospitalizations:   Any hospitalizations or ED visits within the last 12 months?: No      Advance Care Planning:   Living will: No    Durable POA for healthcare: No    Advanced directive: No    Advanced directive counseling given: Yes      Cognitive Screening:   Provider or family/friend/caregiver concerned regarding cognition?: No    Preventive Screenings      Cardiovascular Screening:    General: History Lipid Disorder and Screening Current      Diabetes Screening:     General: Screening Current      Colorectal Cancer Screening:     General: Screening Current      Prostate Cancer Screening:    General: Risks and Benefits Discussed    Due for: PSA      Osteoporosis Screening:    General: Screening Not Indicated      Abdominal Aortic Aneurysm (AAA) Screening:    Risk factors include: age between 65-76 yo and tobacco use        General: Screening Current      Lung Cancer  "Screening:     General: Risks and Benefits Discussed    Due for: Low Dose CT (LDCT)      Hepatitis C Screening:    General: Screening Current    Immunizations:  - Immunizations due: Zoster (Shingrix)    Screening, Brief Intervention, and Referral to Treatment (SBIRT)     Screening    Typical number of drinks in a week: 1    Single Item Drug Screening:  How often have you used an illegal drug (including marijuana) or a prescription medication for non-medical reasons in the past year? never    Single Item Drug Screen Score: 0  Interpretation: Negative screen for possible drug use disorder    Other Counseling Topics:   Car/seat belt/driving safety, skin self-exam, sunscreen and calcium and vitamin D intake and regular weightbearing exercise.     Social Drivers of Health     Financial Resource Strain: High Risk (9/28/2023)    Overall Financial Resource Strain (CARDIA)    • Difficulty of Paying Living Expenses: Very hard   Food Insecurity: No Food Insecurity (7/21/2025)    Nursing - Inadequate Food Risk Classification    • Worried About Running Out of Food in the Last Year: Never true    • Ran Out of Food in the Last Year: Never true   Transportation Needs: No Transportation Needs (7/21/2025)    PRAPARE - Transportation    • Lack of Transportation (Medical): No    • Lack of Transportation (Non-Medical): No   Housing Stability: Low Risk  (7/21/2025)    Housing Stability Vital Sign    • Unable to Pay for Housing in the Last Year: No    • Number of Times Moved in the Last Year: 0    • Homeless in the Last Year: No   Utilities: Not At Risk (7/21/2025)    [facility] Utilities    • Threatened with loss of utilities: No     No results found.    Objective   /62 (BP Location: Right arm, Patient Position: Sitting, Cuff Size: Adult)   Pulse 63   Ht 5' 2\" (1.575 m)   Wt 74.8 kg (164 lb 12.8 oz)   SpO2 98%   BMI 30.14 kg/m²     Physical Exam  Vitals and nursing note reviewed.   Constitutional:       General: He is not in acute " distress.     Appearance: Normal appearance. He is well-developed. He is not ill-appearing.   HENT:      Head: Normocephalic.     Eyes:      Conjunctiva/sclera: Conjunctivae normal.       Cardiovascular:      Rate and Rhythm: Normal rate and regular rhythm.      Pulses: Normal pulses.           Carotid pulses are 2+ on the right side and 2+ on the left side.       Radial pulses are 2+ on the right side and 2+ on the left side.        Posterior tibial pulses are 2+ on the right side and 2+ on the left side.      Heart sounds: Normal heart sounds. No murmur heard.  Pulmonary:      Effort: Pulmonary effort is normal. No respiratory distress.      Breath sounds: Normal breath sounds. No decreased breath sounds, wheezing, rhonchi or rales.   Abdominal:      General: Abdomen is flat. There is no distension.      Palpations: Abdomen is soft.      Tenderness: There is no abdominal tenderness. There is no guarding.     Musculoskeletal:         General: No swelling. Normal range of motion.      Cervical back: Normal range of motion and neck supple.      Right lower leg: No edema.      Left lower leg: No edema.     Skin:     General: Skin is warm and dry.      Capillary Refill: Capillary refill takes less than 2 seconds.     Neurological:      General: No focal deficit present.      Mental Status: He is alert and oriented to person, place, and time.     Psychiatric:         Mood and Affect: Mood normal.         Behavior: Behavior normal.         Thought Content: Thought content normal.         Judgment: Judgment normal.

## 2025-07-21 NOTE — ASSESSMENT & PLAN NOTE
Well-controlled on current regimen.  Orders:  •  CBC and differential; Future  •  Comprehensive metabolic panel; Future  •  UA w Reflex to Microscopic w Reflex to Culture; Future

## 2025-07-21 NOTE — ASSESSMENT & PLAN NOTE
Patient continues to follow with vascular surgery and cardiology regarding this condition.  Patient reports that Flexeril use does improve his symptoms.  Orders:  •  cyclobenzaprine (FLEXERIL) 10 mg tablet; Take 1 tablet (10 mg total) by mouth 3 (three) times a day as needed for muscle spasms

## 2025-07-23 ENCOUNTER — TELEPHONE (OUTPATIENT)
Age: 70
End: 2025-07-23

## 2025-07-23 ENCOUNTER — TELEPHONE (OUTPATIENT)
Dept: FAMILY MEDICINE CLINIC | Facility: CLINIC | Age: 70
End: 2025-07-23

## 2025-07-23 NOTE — TELEPHONE ENCOUNTER
Kylie from [facility] called in prior auth was submitted inquiring if patient used medication before discussed with Kylie she understood and had no further questions

## 2025-07-23 NOTE — TELEPHONE ENCOUNTER
PA for cyclobenzaprine 10mg tablet SUBMITTED to [employer]    via    [x]CMM-KEY: BETY  []Suleman-Case ID #   []Availity-Auth ID # NDC #   []Faxed to plan   []Other website   []Phone call Case ID #     [x]PA sent as URGENT    All office notes, labs and other pertaining documents and studies sent. Clinical questions answered. Awaiting determination from insurance company.     Turnaround time for your insurance to make a decision on your Prior Authorization can take 7-21 business days.

## 2025-07-23 NOTE — TELEPHONE ENCOUNTER
PA for cyclobenzaprine 10mg tablet APPROVED     Date(s) approved 07/23/2025-10/21/2025    Patient advised by          [x][employer] Message  []Phone call   []LMOM  []L/M to call office as no active Communication consent on file  [x]Unable to leave detailed message as VM not approved on Communication consent       Pharmacy advised by    [x]Fax  []Phone call  []Secure Chat    Approval letter scanned into Media Yes